# Patient Record
Sex: FEMALE | Race: WHITE | NOT HISPANIC OR LATINO | Employment: PART TIME | URBAN - METROPOLITAN AREA
[De-identification: names, ages, dates, MRNs, and addresses within clinical notes are randomized per-mention and may not be internally consistent; named-entity substitution may affect disease eponyms.]

---

## 2017-01-24 ENCOUNTER — GENERIC CONVERSION - ENCOUNTER (OUTPATIENT)
Dept: OTHER | Facility: OTHER | Age: 52
End: 2017-01-24

## 2017-01-24 DIAGNOSIS — Z12.31 ENCOUNTER FOR SCREENING MAMMOGRAM FOR MALIGNANT NEOPLASM OF BREAST: ICD-10-CM

## 2017-01-24 DIAGNOSIS — R92.2 INCONCLUSIVE MAMMOGRAM: ICD-10-CM

## 2017-01-24 DIAGNOSIS — Z80.3 FAMILY HISTORY OF MALIGNANT NEOPLASM OF BREAST: ICD-10-CM

## 2017-01-27 ENCOUNTER — GENERIC CONVERSION - ENCOUNTER (OUTPATIENT)
Dept: OTHER | Facility: OTHER | Age: 52
End: 2017-01-27

## 2017-02-15 ENCOUNTER — ALLSCRIPTS OFFICE VISIT (OUTPATIENT)
Dept: OTHER | Facility: OTHER | Age: 52
End: 2017-02-15

## 2017-02-17 ENCOUNTER — ALLSCRIPTS OFFICE VISIT (OUTPATIENT)
Dept: OTHER | Facility: OTHER | Age: 52
End: 2017-02-17

## 2017-02-17 LAB — PAP (HISTORICAL): NORMAL

## 2017-02-19 ENCOUNTER — GENERIC CONVERSION - ENCOUNTER (OUTPATIENT)
Dept: OTHER | Facility: OTHER | Age: 52
End: 2017-02-19

## 2017-03-15 ENCOUNTER — ALLSCRIPTS OFFICE VISIT (OUTPATIENT)
Dept: OTHER | Facility: OTHER | Age: 52
End: 2017-03-15

## 2017-04-12 ENCOUNTER — ALLSCRIPTS OFFICE VISIT (OUTPATIENT)
Dept: OTHER | Facility: OTHER | Age: 52
End: 2017-04-12

## 2017-06-30 ENCOUNTER — ALLSCRIPTS OFFICE VISIT (OUTPATIENT)
Dept: OTHER | Facility: OTHER | Age: 52
End: 2017-06-30

## 2017-06-30 ENCOUNTER — TRANSCRIBE ORDERS (OUTPATIENT)
Dept: ADMINISTRATIVE | Facility: HOSPITAL | Age: 52
End: 2017-06-30

## 2017-06-30 DIAGNOSIS — M79.672 PAIN OF LEFT FOOT: ICD-10-CM

## 2017-06-30 DIAGNOSIS — M25.551 PAIN IN RIGHT HIP: ICD-10-CM

## 2017-06-30 DIAGNOSIS — Z13.1 ENCOUNTER FOR SCREENING FOR DIABETES MELLITUS: ICD-10-CM

## 2017-06-30 DIAGNOSIS — Z11.59 ENCOUNTER FOR SCREENING FOR OTHER VIRAL DISEASES: ICD-10-CM

## 2017-06-30 DIAGNOSIS — Z13.6 ENCOUNTER FOR SCREENING FOR CARDIOVASCULAR DISORDERS: ICD-10-CM

## 2017-07-06 ENCOUNTER — HOSPITAL ENCOUNTER (OUTPATIENT)
Dept: RADIOLOGY | Facility: HOSPITAL | Age: 52
Discharge: HOME | End: 2017-07-06
Attending: INTERNAL MEDICINE
Payer: COMMERCIAL

## 2017-07-06 ENCOUNTER — GENERIC CONVERSION - ENCOUNTER (OUTPATIENT)
Dept: OTHER | Facility: OTHER | Age: 52
End: 2017-07-06

## 2017-07-06 DIAGNOSIS — M25.551 PAIN IN RIGHT HIP: ICD-10-CM

## 2017-07-06 DIAGNOSIS — M79.672 PAIN OF LEFT FOOT: ICD-10-CM

## 2017-07-06 LAB — AMBIG ABBREV LP DEFAULT (HISTORICAL): NORMAL

## 2017-07-06 PROCEDURE — 73502 X-RAY EXAM HIP UNI 2-3 VIEWS: CPT

## 2017-07-06 PROCEDURE — 73630 X-RAY EXAM OF FOOT: CPT

## 2017-07-07 ENCOUNTER — GENERIC CONVERSION - ENCOUNTER (OUTPATIENT)
Dept: OTHER | Facility: OTHER | Age: 52
End: 2017-07-07

## 2017-07-07 LAB
CHOLEST SERPL-MCNC: 177 MG/DL (ref 100–199)
GLUCOSE SERPL-MCNC: 90 MG/DL (ref 65–99)
HDLC SERPL-MCNC: 47 MG/DL
HEP C RNA QUAL (HISTORICAL): 0.1 S/CO RATIO (ref 0–0.9)
INTERPRETATION (HISTORICAL): NORMAL
LDLC SERPL CALC-MCNC: 108 MG/DL (ref 0–99)
TRIGL SERPL-MCNC: 110 MG/DL (ref 0–149)
VLDLC SERPL CALC-MCNC: 22 MG/DL (ref 5–40)

## 2017-07-31 ENCOUNTER — ALLSCRIPTS OFFICE VISIT (OUTPATIENT)
Dept: OTHER | Facility: OTHER | Age: 52
End: 2017-07-31

## 2017-09-12 ENCOUNTER — GENERIC CONVERSION - ENCOUNTER (OUTPATIENT)
Dept: OTHER | Facility: OTHER | Age: 52
End: 2017-09-12

## 2017-09-13 ENCOUNTER — GENERIC CONVERSION - ENCOUNTER (OUTPATIENT)
Dept: OTHER | Facility: OTHER | Age: 52
End: 2017-09-13

## 2017-09-13 DIAGNOSIS — Z12.31 ENCOUNTER FOR SCREENING MAMMOGRAM FOR MALIGNANT NEOPLASM OF BREAST: ICD-10-CM

## 2017-12-15 ENCOUNTER — ALLSCRIPTS OFFICE VISIT (OUTPATIENT)
Dept: OTHER | Facility: OTHER | Age: 52
End: 2017-12-15

## 2017-12-16 NOTE — PROGRESS NOTES
Assessment  1  Traumatic injury of head, initial encounter (649 01) (S09 90XA)   2  Concussion (850 9) (S06 0X9A)    Plan  Concussion, Traumatic injury of head, initial encounter    · * CT HEAD WO CONTRAST; Status:Need Information - Financial Authorization; Requested for:[id number];     Discussion/Summary    Tylenol for pain  Have CT of head done  Follow up if not improving  The patient was counseled regarding diagnostic results,-- instructions for management,-- risk factor reductions,-- impressions,-- risks and benefits of treatment options,-- importance of compliance with treatment  total time of encounter was 25 minutes-- and-- 20 minutes was spent counseling  Possible side effects of new medications were reviewed with the patient/guardian today  The treatment plan was reviewed with the patient/guardian  The patient/guardian understands and agrees with the treatment plan     Self Referrals: No      Chief Complaint  C/C ER follow up head injury 3 days ago      History of Present Illness  HPI: Acute visitPatient at work on Tuesday 12/12/17 had a box fall off a shelf and strike her at her mid forehead  She recalls falling backward but does not think she had any loss of consciousness  She went to [facility] ER, where she was evaluated, no studies were done and she was discharged  The following day she felt a little lightheaded and had a headache and noted a headache when she would go from bending to standing  She has had some mild nausea, no visual disturbance, but describes herself isn't just started feeling herself  Yesterday when she awoke her face was swollen in her nose was swollen  She presents today for re-evaluation  She states she feels as if she is getting better but does not feel herself  States she feels a little disconnected  ER records obtained and reviewed  Review of Systems   Constitutional: feeling poorly-- and-- feeling tired, but-- no fever-- and-- no chills    ENT: no earache,-- no nosebleeds,-- no hearing loss-- and-- no nasal discharge  Cardiovascular: no complaints of slow or fast heart rate, no chest pain, no palpitations, no leg claudication or lower extremity edema  Respiratory: no complaints of shortness of breath, no wheezing, no dyspnea on exertion, no orthopnea or PND  Gastrointestinal: no abdominal pain  Genitourinary: no dysuria  Musculoskeletal: arthralgias-- and-- Slight tenderness right shoulder  Neurological: headache,-- confusion-- and-- dizziness, but-- no numbness,-- no tingling-- and-- no fainting  Active Problems  1  Asymptomatic postmenopausal state (V49 81) (Z78 0)   2  Back pain, lumbosacral (724 2,724 6) (M54 5)   3  Bicipital tendinitis of right shoulder (726 12) (M75 21)   4  Carpal tunnel syndrome of right wrist (354 0) (G56 01)   5  Cervical disc disorder with radiculopathy (723 4) (M50 10)   6  Chronic cluster headache, not intractable (339 02) (G44 029)   7  Chronic venous hypertension involving both sides (459 30) (I87 303)   8  Dense breasts (793 82) (R92 2)   9  Encounter for gynecological examination with abnormal finding (V72 31) (Z01 411)   10  Encounter for screening mammogram for malignant neoplasm of breast (V76 12)  (Z12 31)   11  Foot pain, left (729 5) (M79 672)   12  Lymphedema (457 1) (I89 0)   13  Need for hepatitis C screening test (V73 89) (Z11 59)   14  Right hip pain (719 45) (M25 551)   15  Screening for cardiovascular condition (V81 2) (Z13 6)   16  Screening for diabetes mellitus (V77 1) (Z13 1)   17  Spider veins (448 1) (I78 1)   18  Varicose veins (454 9) (I83 90)   19  Visit for screening mammogram (T57 83) (Z12 31)    Past Medical History  Active Problems And Past Medical History Reviewed: The active problems and past medical history were reviewed and updated today  Surgical History  Surgical History Reviewed: The surgical history was reviewed and updated today         Social History   · Brushes teeth twice a day   · Current every day smoker (305 1) (F17 200)   · 1 ppd x 40 yrs   · Dental care, regularly   · Full-time employment   · [address]   ·    · No drug use   · Social alcohol use (Z78 9)   · Two children  The social history was reviewed and updated today  The social history was reviewed and is unchanged  Family History  Family History Reviewed: The family history was reviewed and updated today  Current Meds   1  Amitriptyline HCl - 25 MG Oral Tablet; take 2 tablets at bedtime; Therapy: 73VIO3982 to (Last Rx:80Lfx5864)  Requested for: 77GBS7265 Ordered   2  Eletriptan Hydrobromide 40 MG Oral Tablet; TAKE 1 TABLET AT ONSET OF MIGRAINE  MAY REPEAT ONCE AFTER 2 HOURS  MAX 2 DOSES/24 HOURS; Therapy: 54RVV4272 to (Evaluate:98Bww8508)  Requested for: 04Iuo5336; Last Rx:13Sep2017 Ordered   3  Gabapentin 300 MG Oral Capsule; take 1 capsule twice a day; Therapy: 61NKE1388 to (Evaluate:21Sep2017)  Requested for: 71NXN6595; Last Rx:13Rof3419 Ordered   4  Hydrocodone-Acetaminophen 5-325 MG Oral Tablet; TAKE 1 TABLET 3 times daily PRN; Therapy: 92OAD2070 to (Evaluate:62Mlp2737); Last Rx:02Nov2016 Ordered   5  Topiramate 25 MG Oral Tablet; take up to 4 po q hs (takes 2 po q hs usually but on days she has a headache increased to 4 q hs); Therapy: 89IEL6661 to [phone])  Requested for: 95XJY9956; Last Rx:01Nov2017 Ordered   6  Verapamil HCl  MG Oral Capsule Extended Release 24 Hour; take 1 capsule daily; Therapy: 06VEY1757 to (Last Rx:19Izb4000)  Requested for: 06LZW9796 Ordered   7  Verapamil HCl  MG Oral Capsule Extended Release 24 Hour; TAKE 1 CAPSULE EVERY DAY; Therapy: 84WZM9334 to (Evaluate:13Jun2017)  Requested for: 50JTW4079; Last Rx:15Mar2017 Ordered    The medication list was reviewed and updated today  Allergies  1  No Known Drug Allergies  2  No Known Environmental Allergies   3   No Known Food Allergies    Vitals   Recorded: 15Dec2017 01:25PM   Temperature 98 7 F   Heart Rate 84   Systolic 763 Diastolic 80   Weight 489 lb    BMI Calculated 35 58   BSA Calculated 2 29   O2 Saturation 98     Physical Exam   Constitutional  General appearance: No acute distress, well appearing and well nourished  -- Patient is awake alert and oriented  Eyes  Conjunctiva and lids: No swelling, erythema or discharge  -- Ecchymosis of the inferior periorbital area in parentheses raccoon eyes)  Pupils and irises: Equal, round and reactive to light  Ears, Nose, Mouth, and Throat  External inspection of ears and nose: Normal    Otoscopic examination: Tympanic membranes translucent with normal light reflex  Canals patent without erythema  Nasal mucosa, septum, and turbinates: Normal without edema or erythema  Oropharynx: Normal with no erythema, edema, exudate or lesions  Pulmonary  Respiratory effort: No increased work of breathing or signs of respiratory distress  Auscultation of lungs: Clear to auscultation  Cardiovascular  Auscultation of heart: Normal rate and rhythm, normal S1 and S2, without murmurs  Examination of extremities for edema and/or varicosities: Normal    Carotid pulses: Normal    Lymphatic  Palpation of lymph nodes in neck: No lymphadenopathy  Musculoskeletal  Gait and station: Normal    Skin  Skin and subcutaneous tissue: Normal without rashes or lesions  Neurologic  Cranial nerves: Cranial nerves 2-12 intact  Reflexes: 2+ and symmetric  Sensation: No sensory loss  Psychiatric  Orientation to person, place, and time: Normal    Mood and affect: Normal    Additional Exam:  Cranial nerves 2-12 intact, DTRs equal, strength equal, gross sensory motor functions intact, cerebral cerebellar functions intact  Message  Return to work or school:   Lesly Ignacio is under my professional care  She was seen in my office on 12/15/17   She is able to return to work on  12/18/17      Please excuse Abhi Barbosa from work 12/15-12/17/17 due to an acute illness  Claude Sexton PA-C        Future Appointments    Date/Time Provider Specialty Site   02/20/2018 03:55 PM Altagracia Holt MD Neurology [facility]   02/16/2018 01:00 PM Fransisco Loera[address] [facility]     Signatures   Electronically signed by : Lisa Barrett, [facility]; Dec 15 2017  2:20PM EST                       (Author)    Electronically signed by : Andie Babb MD; Dec 15 2017  2:34PM EST

## 2018-01-10 ENCOUNTER — ALLSCRIPTS OFFICE VISIT (OUTPATIENT)
Dept: OTHER | Facility: OTHER | Age: 53
End: 2018-01-10

## 2018-01-10 NOTE — RESULT NOTES
Message   Labs look pretty good  A little anemic (low RBC count)  Would like you to to repeat the CBC with checking iron, B12, folate, ferritin  Verified Results  (1) CBC/PLT/DIFF 08Apr2016 09:53AM SOASTA     Test Name Result Flag Reference   WBC 4 4 x10E3/uL  3 4-10 8   RBC 3 49 x10E6/uL L 3 77-5 28   Hemoglobin 10 7 g/dL L 11 1-15 9   Hematocrit 32 7 % L 34 0-46  6   MCV 94 fL  79-97   MCH 30 7 pg  26 6-33 0   MCHC 32 7 g/dL  31 5-35 7   RDW 15 6 % H 12 3-15 4   Platelets 758 M25P4/TG  150-379   Neutrophils 52 %     Lymphs 38 %     Monocytes 7 %     Eos 2 %     Basos 1 %     Neutrophils (Absolute) 2 3 x10E3/uL  1 4-7 0   Lymphs (Absolute) 1 7 x10E3/uL  0 7-3 1   Monocytes(Absolute) 0 3 x10E3/uL  0 1-0 9   Eos (Absolute) 0 1 x10E3/uL  0 0-0 4   Baso (Absolute) 0 0 x10E3/uL  0 0-0 2   Immature Granulocytes 0 %     Immature Grans (Abs) 0 0 x10E3/uL  0 0-0 1     (1) COMPREHENSIVE METABOLIC PANEL 55IOP6084 67:52OV SOASTA     Test Name Result Flag Reference   Glucose, Serum 92 mg/dL  65-99   BUN 13 mg/dL  6-24   Creatinine, Serum 0 61 mg/dL  0 57-1 00   eGFR If NonAfricn Am 105 mL/min/1 73  >59   eGFR If Africn Am 121 mL/min/1 73  >59   BUN/Creatinine Ratio 21  9-23   Sodium, Serum 140 mmol/L  134-144   Potassium, Serum 4 6 mmol/L  3 5-5 2   Chloride, Serum 104 mmol/L     Carbon Dioxide, Total 22 mmol/L  18-29   Calcium, Serum 9 0 mg/dL  8 7-10 2   Protein, Total, Serum 6 4 g/dL  6 0-8 5   Albumin, Serum 4 0 g/dL  3 5-5 5   Globulin, Total 2 4 g/dL  1 5-4 5   A/G Ratio 1 7  1 1-2 5   Bilirubin, Total <0 2 mg/dL  0 0-1 2   Alkaline Phosphatase, S 126 IU/L H    AST (SGOT) 20 IU/L  0-40   ALT (SGPT) 27 IU/L  0-32     (LC) Lipid Panel 08Apr2016 09:53AM Kristy Civil     Test Name Result Flag Reference   Cholesterol, Total 176 mg/dL  100-199   Triglycerides 80 mg/dL  0-149   HDL Cholesterol 51 mg/dL  >39   According to ATP-III Guidelines, HDL-C >59 mg/dL is considered a  negative risk factor for CHD  VLDL Cholesterol Lev 16 mg/dL  5-40   LDL Cholesterol Calc 109 mg/dL H 0-99     (1) TSH 27Zoh9819 09:53AM Mary Calender     Test Name Result Flag Reference   TSH 0 771 uIU/mL  0 450-4 500     [facility]) Sharon Mckeon PPL17 Default 17ITW8462 09:53AM Mary Calender     Test Name Result Flag Reference   Sharon Mckeon CMP14 Default Comment     A hand-written panel/profile was received from your office  In  accordance with the [facility] Ambiguous Test Code Policy dated July 6230, we have completed your order by using the closest currently  or formerly recognized AMA panel  We have assigned Comprehensive  Metabolic Panel (14), Test Code #770231 to this request   If this  is not the testing you wished to receive on this specimen, please  contact the [address] [place] [address] Client Inquiry/Technical Services Department  to clarify the test order  We appreciate your business  [facility]) Sharon Mckeon LP Default [id number] 09:53AM Mary Calender     Test Name Result Flag Reference   Ambig Abbrev LP Default Comment     A hand-written panel/profile was received from your office  In  accordance with the [facility] Ambiguous Test Code Policy dated July 8072, we have completed your order by using the closest currently  or formerly recognized AMA panel  We have assigned Lipid Panel,  Test Code #526379 to this request  If this is not the testing you  wished to receive on this specimen, please contact the [address] [place] [address]  Client Inquiry/Technical Services Department to clarify the test  order  We appreciate your business

## 2018-01-10 NOTE — PROGRESS NOTES
Assessment    1  Bleeding from varicose veins of left lower extremity (454 8) (U80 457)   2  Varicose veins (454 9) (I86 8)   3  Spider veins (448 1) (I78 1)    Plan    1  VAS LOWER LIMB VENOUS DUPLEX STUDY, WITH REFLUX ASSESSMENT, COMPLETE   BILATERAL; Status:Hold For - Scheduling; Requested for:37Sle4424;    2  Follow-up visit in 1 month Evaluation and Treatment  Follow-up  Status: Hold For -   Scheduling  Requested for: 38YYG2190   3  Apply moisturizing cream or lotion several times a day ; Status:Complete;   Done:   78HNS3543   4  Support stockings can help keep the blood from pooling in the small veins in your feet   and legs ; Status:Complete;   Done: 77YVH6972   5  We recommend that you use a pneumatic compression device on your leg ;   Status:Complete;   Done: 94WRP6980    Discussion/Summary  Discussion Summary:   Although the area of bleeding spider and ectasia is appears to be thrombosed following her treatment at [facility] a reevaluation of her venous status is indicated given the extensive spider vein ectasia is affecting both lower extremities and her chronic lymphedema as well as her dry skin  I have recommended she continue to use her gradient compression stockings as well as her lymphedema pumps but most importantly that she apply moisturizer to the skin (Eucerin) to avoid dry skin cracking which could result in bleeding spider veins  Counseling Documentation With Imm: The was counseled regarding diagnostic results, prognosis, risks and benefits of treatment options  total time of encounter was minutes and 25 minutes was spent counseling  Chief Complaint  Chief Complaint Free Text Note Form: " I have varicose veins "   Ms Raisa Yanez is here today to have her legs evaluated  She c/o of bilateral lower extremity bulging varicose veins x40 years  She states she has been treated in the past for her veins but it has been some time   She c/o bilateral lower extremity edema but associates that with lymphedema  She states she did have a bleeding varicose vein in the left leg in April 2016  She does have a hx of left leg vein ligation x15 years ago  She offers no further concerns at this time  SHe is a current smoker and reports smoking roughly a pack per day  History of Present Illness  Free Text HPI: She c/o of bilateral lower extremity bulging varicose veins x40 years  She states she has been treated in the past for her veins but it has been some time  She c/o bilateral lower extremity edema but associates that with lymphedema  She states she did have a bleeding varicose vein in the left leg in April 2016  She does have a hx of left leg vein ligation x15 years ago  She offers no further concerns at this time  SHe is a current smoker and reports smoking roughly a pack per day  Her last lower extremity venous surgical procedure was probably 12 years ago  (Dr Ramandeep Nunez) previously she had several surgical procedures by Dr Bandar Barba at [facility]  She does have lymphedema pumps but has not been wearing them recently  She also has gradient compression stockings but has not worn them in some time as they were ineffective at reducing her lymphedema  Her bleeding episodes occurred from the same cluster of spider veins but the third episode most recently was the most severe  She reports that she had undergone cauterization in the emergency room at [facility]  Review of Systems  Complete Female - Vasc:   Constitutional: No fever or chills, feels well, no tiredness, no recent weight gain or weight loss  Eyes: No sudden vision loss, no blurred vision, no double vision  ENT: no loss of hearing, no nosebleeds, no hoarseness  Cardiovascular: irregular heart rate, no painful veins and no leg pain with walking, but no chest pain, no intermittent leg claudication, no palpitations and bleeding veins     Respiratory: No sob, no wheezing, no cough, no sob with exertion, no orthopnea  Gastrointestinal: No nausea, No vomiting, no diarrhea, no blood in stool  Genitourinary: no dysuria, no Hematuria,no urinary incontinence  Musculoskeletal: no limb pain, no limb swelling  Integumentary: no ulcers, but no rashes, no itching, no skin lesions and no skin wound  Neurological: no dementia, but no numbness, no confusion, no dizziness, no limb weakness, no convulsions, no fainting and no difficulty walking  Psychiatric: no depression, no mood disorders, no anxiety  Hematologic/Lymphatic: no bleeding disorder, no easy bruising  ROS Reviewed:   ROS reviewed  Active Problems    1  Bicipital tendinitis of right shoulder (726 12) (M75 21)   2  Carpal tunnel syndrome of right wrist (354 0) (G56 01)   3  Cellulitis of left lower extremity (682 6) (L03 116)   4  Cervical disc disorder with radiculopathy (723 4) (M50 10)   5  Chronic cluster headache, not intractable (339 02) (G44 029)   6  Cluster headaches (339 00) (G44 009)   7  Lymphedema (457 1) (I89 0)   8  Need for influenza vaccination (V04 81) (Z23)   9  Need for pneumococcal vaccine (V03 82) (Z23)   10  Need for Tdap vaccination (V06 1) (Z23)   11  Strain of foot, right (845 10) (S96 911A)   12  Strain of neck muscle, initial encounter (847 0) (S16 1XXA)   13  Tendinitis of right rotator cuff (726 10) (M75 81)   14  Varicose veins (454 9) (I86 8)    Past Medical History    1  History of Colon polyps (211 3) (K63 5)  Active Problems And Past Medical History Reviewed: The active problems and past medical history were reviewed and updated today  Surgical History  Surgical History Reviewed: The surgical history was reviewed and updated today  Family History  Mother    1  Family history of   Father    2  Family history of cardiac disorder (V17 49) (Z82 49)  Sister    3  Family history of Cancer of appendix   4  Family history of    5   Family history of malignant neoplasm of female breast (V16 3) (Z80 3)  Family History    6  Denied: Family history of mental disorder   7  Denied: Family history of substance abuse  Family History Reviewed: The family history was reviewed and updated today  Social History    · Brushes teeth twice a day   · Current every day smoker (305 1) (F17 200)   · Dental care, regularly   · Full-time employment   ·    · No alcohol use   · No drug use   · Two children  Social History Reviewed: The social history was reviewed and updated today  Current Meds   1  Amitriptyline HCl - 25 MG Oral Tablet; take 2 tablets at bedtime; Therapy: 27IYS6888 to (Evaluate:07Nov2016)  Requested for: 12Auy2585; Last   Rx:26Vnb7784 Ordered   2  Topiramate 25 MG Oral Tablet; take 2 tablets at bedtime; Therapy: 56WWR4576 to (Evaluate:46Wuh1566)  Requested for: 26Apr2016; Last   Rx:36Cih6328 Ordered    Allergies    1  No Known Drug Allergies    2  No Known Environmental Allergies   3  No Known Food Allergies    Vitals  Vital Signs [Data Includes: Current Encounter]    Recorded: 49XPH7456 11:22AM   Heart Rate 68, L Radial   Pulse Quality Normal, L Radial   Respiration 16   Respiration Quality Normal   Systolic 210, LUE, Sitting   Diastolic 76, LUE, Sitting   Height 5 ft 11 in   Weight 248 lb 9 oz   BMI Calculated 34 67   BSA Calculated 2 31     Results/Data  Diagnostic Studies Reviewed Vasc:   Vascular Studies Reviewed: Recent lower extremity venous duplex at [facility] showed no evidence of deep vein thrombosis  Physical Exam    Carotid: right 2+, no bruit heard on the right, left 2+ and no bruit on the left  Brachial: right 2+ and left 2+  Radial: right 2+ and left 2+  Femoral: right 2+, no bruit heard on the right, left 2+ and no bruit on the left  Popliteal: right 2+ and left 2+  Posterior tibialis: right 2+ and left 2+  Dorsalis pedis: right 2+ and left 2+  Distal Pulse Exam: Normal Capillary Refill       Extremities: bilateral ankle pitting edema, bilateral pretibial pitting edema and no foot edema  LE Varicose Veins: right leg truncal veins, left leg truncal veins, right leg reticular veins, left leg reticular veins, right leg 4+ spider veins and left leg 4+ spider veins  Spider veins over the anterolateral left ankle appear to be sclerosed with palpable phlebitic changes both in the area of prior bleeding and extending proximally over the anterior compartment  Future Appointments    Date/Time Provider Specialty Site   05/24/2016 02:00 PM ROGER Daniels   Orthopedic Surgery [facility] SURGICAL [facility]   07/29/2016 12:40 PM Durga Watson MD Neurology [facility] [address] [place] [address]   03/31/2017 11:20 AM Luis Wheeler MD Internal Medicine [facility] INTERNAL MED     Signatures   Electronically signed by : Laura Thao MD; May 10 2016 11:55AM EST                       (Author)    Electronically signed by : Laura Thao MD; May 10 2016  1:09PM EST                       (Author)

## 2018-01-12 VITALS — SYSTOLIC BLOOD PRESSURE: 124 MMHG | DIASTOLIC BLOOD PRESSURE: 73 MMHG | HEIGHT: 71 IN | HEART RATE: 80 BPM

## 2018-01-12 VITALS
HEIGHT: 70 IN | BODY MASS INDEX: 35.65 KG/M2 | SYSTOLIC BLOOD PRESSURE: 120 MMHG | HEART RATE: 95 BPM | DIASTOLIC BLOOD PRESSURE: 78 MMHG | WEIGHT: 249 LBS

## 2018-01-12 NOTE — PROGRESS NOTES
Assessment    1  Encounter for preventive health examination (V70 0) (Z00 00)    Plan  Foot pain, left    · * XR FOOT 3+ VIEW LEFT; Status:Active; Requested AQK:[id number];   Lymphedema    · Gradient compression stocking, below knee, 20-30mm Hg, each; Status:Complete;    Done: [id number]  Need for hepatitis C screening test    · (1) HEP C ANTIBODY; Status:Active; Requested OME:69PQT1366;   Right hip pain    · * XR HIP/PELV 2-3 VWS RIGHT W PELVIS IF PERFORMED; Status:Active; Requested  HNE:[id number];   Screening for cardiovascular condition    · (1) LIPID PANEL, FASTING; Status:Active; Requested GBB:27BDQ5436;   Screening for diabetes mellitus    · (1) GLUCOSE,  FASTING; Status:Active; Requested FBS:09NKH7985;     Discussion/Summary    She is current with preventive screening  We'll order x-rays of the areas in question with discomfort  The patient was counseled regarding instructions for management, impressions  Possible side effects of new medications were reviewed with the patient/guardian today  The treatment plan was reviewed with the patient/guardian  The patient/guardian understands and agrees with the treatment plan     Self Referrals: No      Chief Complaint  Yearly physical      History of Present Illness  HPI: Patient comes in today for yearly physical  She states her chronic problems are stable  She has been noticing some pain on the lateral aspect of her left foot  She initially thought it was her shoes but she switch them and still has the problem  She also notes pain in her right hip  Denies any trauma  Feels like it times that it pops out of place  Usually feels pain with walking  More so in the groin area, no back pain  Review of Systems    Cardiovascular: no chest pain  Respiratory: no shortness of breath  Gastrointestinal: no abdominal pain  Active Problems    1  Asymptomatic postmenopausal state (V49 81) (Z78 0)   2  Back pain, lumbosacral (724 2,724 6) (M54 5)   3   Bicipital tendinitis of right shoulder (726 12) (M75 21)   4  Carpal tunnel syndrome of right wrist (354 0) (G56 01)   5  Cervical disc disorder with radiculopathy (723 4) (M50 10)   6  Chronic cluster headache, not intractable (339 02) (G44 029)   7  Chronic venous hypertension involving both sides (459 30) (I87 303)   8  Dense breasts (793 82) (R92 2)   9  Encounter for gynecological examination with abnormal finding (V72 31) (Z01 411)   10  Lymphedema (457 1) (I89 0)   11  Spider veins (448 1) (I78 1)   12   Varicose veins (454 9) (I86 8)    Past Medical History    · History of Acute maxillary sinusitis, recurrence not specified (461 0) (J01 00)   · History of Bleeding from varicose veins of left lower extremity (454 8) (E17 412)   · History of Cellulitis of left lower extremity (682 6) (L03 116)   · History of Colon polyps (211 3) (K63 5)   · Denied: History of mental disorder   · History of Strain of foot, right (845 10) (O88 568P)   · History of Strain of neck muscle, initial encounter (847 0) (S16 1XXA)    Surgical History    · History of Lower Back Surgery   · History of Neuroplasty Median Nerve At Carpal Tunnel   · History of Tubal Ligation   · History of Varicose Vein Ligation    Family History  Mother    · Family history of   Father    · Family history of cardiac disorder (V17 49) (Z80 55)  Daughter    · Family history of migraine headaches (V17 2) (Z82 0)  Sister    · Family history of Cancer of appendix   · Family history of    · Family history of malignant neoplasm of breast (V16 3) (Z80 3)   · Family history of malignant neoplasm of female breast (V16 3) (Z80 3)  Maternal Grandmother    · Family history of gynecological problem (V18 7) (Z84 2)  Maternal Aunt    · Family history of malignant neoplasm of female breast (V16 3) (Z80 3)    Social History    · Brushes teeth twice a day   · Current every day smoker (305 1) (F17 200)   · 1 ppd x 40 yrs   · Dental care, regularly   · Full-time employment   · [address] [facility]   ·    · No drug use   · Social alcohol use (Z78 9)   · Two children    Current Meds   1  Amitriptyline HCl - 25 MG Oral Tablet; TAKE 2 TABLET Bedtime  Requested for:   78HNA2196; Last Rx:23May2017 Ordered   2  Gabapentin 300 MG Oral Capsule; take 1 capsule twice a day; Therapy: 70OYF8913 to (Evaluate:39Bsr7511)  Requested for: 17EQN4368; Last   Rx:24May2017 Ordered   3  Hydrocodone-Acetaminophen 5-325 MG Oral Tablet; TAKE 1 TABLET 3 times daily PRN; Therapy: 34TWN8987 to (Evaluate:22Uos5883); Last Rx:02Nov2016 Ordered   4  Relpax 40 MG Oral Tablet; TAKE 1 TABLET AT ONSET OF MIGRAINE  MAY REPEAT   ONCE AFTER 2 HOURS  MAX 2 DOSES/24 HOURS; Therapy: 70FHE7573 to (Evaluate:88Kbd3232)  Requested for: 24TFB9248; Last   Rx:15Mar2017 Ordered   5  Topiramate 100 MG Oral Tablet; TAKE 1 TABLET AT BEDTIME; Therapy: 05HCR2180 to ([place])  Requested for: 71XGG3405; Last   Rx:08Jun2017 Ordered   6  Verapamil HCl  MG Oral Capsule Extended Release 24 Hour; take 1 capsule   daily; Therapy: 38AXH4323 to (Evaluate:61Hpm9396)  Requested for: 79LQC7529; Last   Rx:19Jun2017 Ordered   7  Verapamil HCl  MG Oral Capsule Extended Release 24 Hour; TAKE 1 CAPSULE   EVERY DAY; Therapy: 30DJX8011 to (Evaluate:13Jun2017)  Requested for: 22EIZ4386; Last   Rx:15Mar2017 Ordered    Allergies    1  No Known Drug Allergies    2  No Known Environmental Allergies   3  No Known Food Allergies    Vitals   Recorded: 25VBM7095 02:48PM   Heart Rate 76   Systolic 817   Diastolic 80   Height 5 ft 11 in   Weight 253 lb    BMI Calculated 35 29   BSA Calculated 2 33   O2 Saturation 99     Physical Exam    Constitutional   General appearance: Abnormal   obese  Eyes   Conjunctiva and lids: No swelling, erythema or discharge  Pupils and irises: Equal, round and reactive to light      Ears, Nose, Mouth, and Throat   External inspection of ears and nose: Normal     Otoscopic examination: Tympanic membranes translucent with normal light reflex  Canals patent without erythema  Oropharynx: Normal with no erythema, edema, exudate or lesions  Pulmonary   Respiratory effort: No increased work of breathing or signs of respiratory distress  Auscultation of lungs: Clear to auscultation  Cardiovascular   Palpation of heart: Normal PMI, no thrills  Auscultation of heart: Normal rate and rhythm, normal S1 and S2, without murmurs  Examination of extremities for edema and/or varicosities: Abnormal   Significantly edema in both lower extremities  Abdomen   Abdomen: Non-tender, no masses  Liver and spleen: No hepatomegaly or splenomegaly  Lymphatic   Palpation of lymph nodes in neck: No lymphadenopathy  Musculoskeletal   Gait and station: Normal     Digits and nails: Normal without clubbing or cyanosis  Inspection/palpation of joints, bones, and muscles: Normal     Skin   Skin and subcutaneous tissue: Normal without rashes or lesions  Neurologic   Cranial nerves: Cranial nerves 2-12 intact  Reflexes: 2+ and symmetric  Sensation: No sensory loss  Psychiatric   Orientation to person, place, and time: Normal     Mood and affect: Normal        Health Management  Special screening for malignant neoplasm of colon   COLONOSCOPY; every 3 years; Last 89RVU7298; Next Due: 71HGC1301;  Active    Future Appointments    Date/Time Provider Specialty Site   07/31/2017 11:20 AM Esthela Bush MD [facility]rology [facility] [address] [place] [address]   02/16/2018 01:00 PM REINALDO Mcarthur Obstetrics/Gynecology [facility]     Signatures   Electronically signed by : Alida Harrison MD; Jun 30 2017  3:28PM EST                       (Author)

## 2018-01-12 NOTE — RESULT NOTES
Verified Results  ([facility]) Lipid Panel [id number] 10:44AM Derrill Legions     Test Name Result Flag Reference   Cholesterol, Total 177 mg/dL  100-199   Triglycerides 110 mg/dL  0-149   HDL Cholesterol 47 mg/dL  >39   VLDL Cholesterol Lev 22 mg/dL  5-40   LDL Cholesterol Calc 108 mg/dL H 0-99     () Glucose, Serum 76LXK0506 10:44AM [facility]     Test Name Result Flag Reference   Glucose, Serum 90 mg/dL  65-99     [facility]) Larinda Mealing LP Default [id number] 10:44AM Derrill Legions     Test Name Result Flag Reference   Ambig Abbrev LP Default Comment     A hand-written panel/profile was received from your office  In  accordance with the [facility] Ambiguous Test Code Policy dated July 1827, we have completed your order by using the closest currently  or formerly recognized AMA panel  We have assigned Lipid Panel,  Test Code #462449 to this request  If this is not the testing you  wished to receive on this specimen, please contact the [facility]  Client Inquiry/Technical Services Department to clarify the test  order  We appreciate your business  () HCV Antibody reflex to TIGIST [id number] 10:44AM [facility]     Test Name Result Flag Reference   HCV Ab Hepatitis C virus Ab Signal/Cutoff 0 1 s/co ratio  0 0-0 9   Interpretation: Comment     Negative  Not infected with HCV, unless recent infection is suspected or other  evidence exists to indicate HCV infection

## 2018-01-12 NOTE — PROCEDURES
Results/Data    Procedure: Electromyogram and Nerve Conduction Study  Indication: Right Upper Extremity   Referred by Dr Jose Enrique Aldrich  The procedure's were discussed with the patient  Written consent was obtained prior to the procedure and is detailed in the patient's record  Prior to the start of the procedure a time out was taken and the identity of the patient was confirmed via name and date of birth with the patient  The correct site and the procedure to be performed were confirmed  The correct side was confirmed if applicable  The positioning of the patient was verified  The availability of the correct equipment was verified  Procedure Start Time: 1:35    Technique: A sterile concentric needle electrode was used  The patient tolerated the procedure well  Complications include The Patient only agreed to have the right upper extremity done, not the left side  Results  : Motor and sensory nerve conduction studies were performed on the median and ulnar nerves on the right  Patient refused testing of the left upper extremity  The median motor terminal latency was prolonged with a normal compound motor action potential amplitude and a normal conduction velocity across the wrist  The ulnar compound motor action potential was within normal limits  The median F wave latency was prolonged  The ulnar F wave was within normal limits  The median sensory peak latency was prolonged with a low sensory action potential amplitude  The ulnar sensory action potential was within normal limits  The median palmar evoked response was prolonged by 2 6 ms as compared to the ulnar palmar evoked response at the same distance  Concentric needle examination was performed on various proximal and distal muscles on the right including deltoid, biceps, triceps, pronator teres, APB, FDI and low cervical paraspinals  There was no evidence of active denervation in any of the muscles tested   Mild decreased recruitment of giant motor units was noted in the APB  The compound motor unit action potentials were of normal configuration with interference patterns being full or full for effort in the remaining muscles tested  Interpretation: There is electrophysiologic evidence of a:  1  Moderate median nerve compression neuropathy at the wrist with demyelinative changes, consistent with a diagnosis of carpal tunnel syndrome  2 There is no evidence of a ulnar neuropathy or cervical radiculopathy  Clinical correlation is recommended        Signatures   Electronically signed by : Megan Singh MD; Jan 20 2016  2:14PM EST                       (Author)

## 2018-01-12 NOTE — MISCELLANEOUS
Message   Recorded as Task   Date: 01/24/2017 12:53 PM, Created By: Chante Hidalgo   Task Name: Follow Up   Assigned To: Chante Hidalgo   Regarding Patient: Arnold Saleh, Status: Active   Comment:    Chante Hidalgo - 24 Jan 2017 12:53 PM     TASK CREATED  Pt called for mammo script which I approved but I noticed she has never seen us? Pls call pt to see where she will be following up   Sandra Aponte - 25 Jan 2017 4:53 PM     TASK REPLIED TO: Previously Assigned To Sandra Vila  pt has an appt set up already for 2/15/17 at 2 pm with you        Signatures   Electronically signed by : REINALDO Segura; Jan 27 2017  7:30AM EST                       (Author)

## 2018-01-13 VITALS
HEART RATE: 96 BPM | BODY MASS INDEX: 35.14 KG/M2 | OXYGEN SATURATION: 99 % | WEIGHT: 251 LBS | DIASTOLIC BLOOD PRESSURE: 76 MMHG | TEMPERATURE: 98.9 F | HEIGHT: 71 IN | SYSTOLIC BLOOD PRESSURE: 118 MMHG

## 2018-01-13 VITALS
SYSTOLIC BLOOD PRESSURE: 120 MMHG | WEIGHT: 253 LBS | HEIGHT: 71 IN | OXYGEN SATURATION: 99 % | DIASTOLIC BLOOD PRESSURE: 80 MMHG | HEART RATE: 76 BPM | BODY MASS INDEX: 35.42 KG/M2

## 2018-01-14 VITALS
BODY MASS INDEX: 35.28 KG/M2 | DIASTOLIC BLOOD PRESSURE: 80 MMHG | HEIGHT: 71 IN | WEIGHT: 252 LBS | HEART RATE: 84 BPM | RESPIRATION RATE: 18 BRPM | SYSTOLIC BLOOD PRESSURE: 128 MMHG

## 2018-01-14 NOTE — RESULT NOTES
Verified Results  (B) PAP (REFLEX TO HPV PLUS WHEN ASC-US) [id number] 04:20PM [place] [facility]     Test Name Result Flag Reference   PAP, LIQUID-BASED NILM     DIAGNOSIS:            Negative for intraepithelial lesion or malignancy  ADEQUACY:             Satisfactory for evaluation /                         Endocervical/transformation zone component                         present  COMMENT:              This Pap smear was screened with the assistance                         of the aihuishouPrep(TM) Imaging System and                         screened by a cytotechnologist   SPECIMEN SOURCE:      PAP (RFLX HPV PLUS WHENASC-US), CERVIX  CLINICAL INFORMATION: LMP: N/A                        Post menopausal                        Provided Diagnosis Codes: X57 303                                                Cervicovaginal cytology should be considered a                         screening procedure subject to false negatives                         and false positives  Results are more reliable                         when a satisfactory sample is obtained on a                         regular repetitive basis, and should be                         interpreted together with past and current                         clinical data    ELECTRONICALLY SIGNED   BY:                   Screened By: FRITZ Toro (ASCP)   Case                         Electronically Signed 02/17/2017

## 2018-01-15 ENCOUNTER — GENERIC CONVERSION - ENCOUNTER (OUTPATIENT)
Dept: OTHER | Facility: OTHER | Age: 53
End: 2018-01-15

## 2018-01-15 VITALS
BODY MASS INDEX: 35.14 KG/M2 | HEIGHT: 71 IN | DIASTOLIC BLOOD PRESSURE: 78 MMHG | SYSTOLIC BLOOD PRESSURE: 122 MMHG | WEIGHT: 251 LBS

## 2018-01-15 NOTE — RESULT NOTES
Message   Recent labs done are in normal range  The Vit B12 level is low normal, so you would benefit by taking Vit B12 1000 units daily OTC  Verified Results  (1) CBC/PLT/DIFF [id number] 01:29PM [facility]     Test Name Result Flag Reference   WBC 4 4 x10E3/uL  3 4-10 8   RBC 4 26 x10E6/uL  3 77-5 28   Hemoglobin 12 7 g/dL  11 1-15 9   Hematocrit 37 9 %  34 0-46  6   MCV 89 fL  79-97   MCH 29 8 pg  26 6-33 0   MCHC 33 5 g/dL  31 5-35 7   RDW 14 3 %  12 3-15 4   Platelets 982 Y55R1/EZ  150-379   Neutrophils 58 %     Lymphs 33 %     Monocytes 6 %     Eos 2 %     Basos 1 %     Neutrophils (Absolute) 2 6 x10E3/uL  1 4-7 0   Lymphs (Absolute) 1 4 x10E3/uL  0 7-3 1   Monocytes(Absolute) 0 3 x10E3/uL  0 1-0 9   Eos (Absolute) 0 1 x10E3/uL  0 0-0 4   Baso (Absolute) 0 0 x10E3/uL  0 0-0 2   Immature Granulocytes 0 %     Immature Grans (Abs) 0 0 x10E3/uL  0 0-0 1

## 2018-01-17 NOTE — PROGRESS NOTES
Assessment    1  Encounter for preventive health examination (V70 0) (Z00 00)   2  Acute pain of right shoulder (719 41) (M25 511)   3  Cellulitis of left lower extremity (682 6) (L03 116)   4  Rotator cuff syndrome of right shoulder (726 10) (M75 101)   5  Varicose veins (454 9) (I86 8)    Plan  Cellulitis of left lower extremity    · Cephalexin 500 MG Oral Tablet (Cephalexin Monohydrate); TAKE 1 TABLET 3  times daily  Health Maintenance    · (Q) THINPREP PAP; Status:Active - Retrospective By Protocol Authorization; Requested  for:30Mar2016;    · Follow-up visit in 1 year Evaluation and Treatment  Follow-up Dr Anca Forrest  Status:  Complete  Done: 32LHG1583  Need for influenza vaccination    · Stop: Fluzone Quadrivalent Intramuscular Suspension  Need for pneumococcal vaccine    · Stop: Prevnar 13 Intramuscular Suspension  Need for Tdap vaccination    · Tdap (Adacel)  Rotator cuff syndrome of right shoulder,     · 1 - Misha WHALEN, Jennie Chao (Orthopedic Surgery) Physician Referral  Consult  Status: Active   Requested for: 57BRX9421  Care Summary provided  : Yes  Screening for heart disease    · (1) CBC/PLT/DIFF; Status:Active; Requested for:30Mar2016;    · (1) COMPREHENSIVE METABOLIC PANEL; Status:Active; Requested for:30Mar2016;    · (1) LIPID PANEL, FASTING; Status:Active; Requested for:30Mar2016;    · (1) TSH; Status:Active; Requested for:30Mar2016;   Varicose veins    · 1 - Balbina WHALEN, Fani Couch  (Vascular Surgery) Physician Referral  Consult  Status: Active   Requested for: 26CNI7433  Care Summary provided  : Yes    Discussion/Summary  health maintenance visit healthy adult female Currently, she eats a healthy diet and has an inadequate exercise regimen  the risks and benefits of cervical cancer screening were discussed cervical cancer screening is current Breast cancer screening: the risks and benefits of breast cancer screening were discussed, monthly self breast exam was advised and mammogram is current   Colorectal cancer screening: the risks and benefits of colorectal cancer screening were discussed and colorectal cancer screening is current  The risks and benefits of immunizations were discussed and immunizations are needed  She was advised to be evaluated by an ophthalmologist  Advice and education were given regarding tobacco cessation  Patient discussion: discussed with the patient  Will treat suspect early cellulitis with antibiotic  Will obtain screening labs  Will refer to vascular for varicosities and lymphedema  Will refer to ortho for R shoulder pain  Follow yearly and as needed  Can apply topical moist heat to LLE 3-4 times daily  Possible side effects of new medications were reviewed with the patient/guardian today  The patient was counseled regarding instructions for management, risk factor reductions, risks and benefits of treatment options, importance of compliance with treatment  Chief Complaint  PATIENT HERE FOR YEARLY PHYSICAL AND STITCH REMOVAL      History of Present Illness  HM, Adult Female: The patient is being seen for a health maintenance evaluation  The last health maintenance visit was 1 5 year(s) ago  General Health: The patient's health since the last visit is described as good  She has regular dental visits  She denies vision problems  She denies hearing loss  Immunizations status: not up to date  Lifestyle:  She consumes a diverse and healthy diet  She has weight concerns  She does not exercise regularly  She uses tobacco  She denies alcohol use  She denies drug use  Screening: cancer screening reviewed and updated  metabolic screening reviewed and updated  risk screening reviewed and updated  HPI: 45 yo W female present for health assessment  7 days ago was seen in ER for bleeding varicose vein in her L leg  Vein was cauterized and sutured  Will need suture removed today  Has hx/o varicose veins and chronic lymphedema   She would like to see vascular for an opinion regarding this  She admits she does have edema pumps at home but can not use them for the required time of 12 hours/day  Has noted the area around the varicose vein has become painful and tender and red in appearance  Having R shoulder pain  Hurts on certain movements and feels weak  Cellulitis (Brief): The patient is being seen for an initial evaluation of cellulitis  Symptoms:  skin redness and skin warmth  Shoulder Tendonitis (Brief): The patient is being seen for an initial evaluation of shoulder tendonitis  Symptoms:  shoulder pain, shoulder stiffness, shoulder tenderness, shoulder weakness and decreased shoulder range of motion  The patient is currently experiencing symptoms  Review of Systems    Constitutional: no fever, not feeling poorly, no chills and not feeling tired  Eyes: No complaints of eye pain, no red eyes, no eyesight problems, no discharge, no dry eyes, no itching of eyes  ENT: no complaints of earache, no loss of hearing, no nose bleeds, no nasal discharge, no sore throat, no hoarseness  Cardiovascular: No complaints of slow heart rate, no fast heart rate, no chest pain, no palpitations, no leg claudication, no lower extremity edema  Respiratory: No complaints of shortness of breath, no wheezing, no cough, no SOB on exertion, no orthopnea, no PND  Gastrointestinal: No complaints of abdominal pain, no constipation, no nausea or vomiting, no diarrhea, no bloody stools  Genitourinary: No complaints of dysuria, no incontinence, no pelvic pain, no dysmenorrhea, no vaginal discharge or bleeding  Musculoskeletal: limb pain and limb swelling, but as noted in HPI  Integumentary: a rash and skin lesion  Neurological: No complaints of headache, no confusion, no convulsions, no numbness, no dizziness or fainting, no tingling, no limb weakness, no difficulty walking     Psychiatric: Not suicidal, no sleep disturbance, no anxiety or depression, no change in personality, no emotional problems  Hematologic/Lymphatic: No complaints of swollen glands, no swollen glands in the neck, does not bleed easily, does not bruise easily  Active Problems    1  Carpal tunnel syndrome of right wrist (354 0) (G56 01)   2  Cervical disc disorder with radiculopathy (723 4) (M50 10)   3  Chronic cluster headache, not intractable (339 02) (G44 029)   4  Lymphedema (457 1) (I89 0)   5  Strain of foot, right (845 10) (S96 911A)   6  Strain of neck muscle, initial encounter (847 0) (S16 1XXA)    Past Medical History    · History of Colon polyps (211 3) (K63 5)    Surgical History    · History of Lower Back Surgery   · History of Varicose Vein Ligation    Family History    · Family history of     · Family history of cardiac disorder (V17 49) (Z82 49)    · Family history of Cancer of appendix   · Family history of    · Family history of malignant neoplasm of female breast (V16 3) (Z80 3)    · Denied: Family history of mental disorder   · Denied: Family history of substance abuse    Social History    · Brushes teeth twice a day   · Current every day smoker (305 1) (F17 200)   · 1 ppd x 40 yrs   · Dental care, regularly   · Full-time employment   · [address]   ·    · No alcohol use   · No drug use   · Two children    Current Meds   1  Amitriptyline HCl - 25 MG Oral Tablet; take 2 tablet daily; Therapy: 75DIE9979 to (Evaluate:66Osw8309) Recorded    Allergies    1  No Known Drug Allergies    Vitals   Recorded: 37WVM5455 10:49AM   Temperature 98 6 F   Heart Rate 943   Systolic 614   Diastolic 80   Height 6 ft    Weight 250 lb    BMI Calculated 33 91   BSA Calculated 2 34   O2 Saturation 96     Physical Exam    Constitutional   General appearance: No acute distress, well appearing and well nourished  obese  Head and Face   Head and face: Normal     Eyes   Conjunctiva and lids: No swelling, erythema or discharge  Pupils and irises: Equal, round, reactive to light      Ears, Nose, Mouth, and Throat   External inspection of ears and nose: Normal     Otoscopic examination: Tympanic membranes translucent with normal light reflex  Canals patent without erythema  Hearing: Normal     Nasal mucosa, septum, and turbinates: Normal without edema or erythema  Lips, teeth, and gums: Normal, good dentition  Oropharynx: Normal with no erythema, edema, exudate or lesions  Neck   Neck: Supple, symmetric, trachea midline, no masses  Thyroid: Normal, no thyromegaly  fullnes at thyroid area, no dominant nodules  Pulmonary   Respiratory effort: No increased work of breathing or signs of respiratory distress  Auscultation of lungs: Clear to auscultation  Cardiovascular   Auscultation of heart: Normal rate and rhythm, normal S1 and S2, no murmurs  Carotid pulses: 2+ bilaterally  Abdominal aorta: Normal     Femoral pulses: 2+ bilaterally  Pedal pulses: 2+ bilaterally  Examination of extremities for edema and/or varicosities: Abnormal   chronic lymph edema of LEs  Abdomen   Abdomen: Non-tender, no masses  Liver and spleen: No hepatomegaly or splenomegaly  Lymphatic   Palpation of lymph nodes in neck: No lymphadenopathy  Palpation of lymph nodes in axillae: No lymphadenopathy  Palpation of lymph nodes in groin: No lymphadenopathy  Palpation of lymph nodes in other areas: No lymphadenopathy  Musculoskeletal   Gait and station: Normal     Digits and nails: Normal without clubbing or cyanosis  Joints, bones, and muscles: Normal     Range of motion: Normal     Stability: Normal     Muscle strength/tone: Normal     Skin   Skin and subcutaneous tissue: Normal without rashes or lesions  Examination of the skin for lesions: Abnormal   sutured VV LLE, , surronding dusky erythema w warmth present w tenderness  Palpation of skin and subcutaneous tissue: Normal turgor  Neurologic   Cranial nerves: Cranial nerves II-XII intact  Reflexes: 2+ and symmetric      Sensation: No sensory loss  Coordination: Normal finger to nose and heel to shin  Psychiatric   Judgment and insight: Normal     Orientation to person, place, and time: Normal     Recent and remote memory: Intact      Mood and affect: Normal        Results/Data  PHQ-9 Adult Depression Screening 40ELE3307 11:42AM User, Ahs     Test Name Result Flag Reference   PHQ-9 Adult Depression Score 1     Q1: 0, Q2: 1, Q3: 0, Q4: 0, Q5: 0, Q6: 0, Q7: 0, Q8: 0, Q9: 0   PHQ-9 Adult Depression Screening Negative     PHQ-9 Difficulty Level Not difficult at all     PHQ-9 Severity Minimal Depression         Future Appointments    Date/Time Provider Specialty Site   05/10/2016 11:15 AM Alissa Young MD Vascular Surgery THE VASCULAR CENTER  [facility]   04/29/2016 12:40 PM Harjinder Diaz MD [facility]   03/31/2017 11:20 AM Mayda Morgan MD Internal Medicine [address] INTERNAL MED     Signatures   Electronically signed by : Caitlin Weston, [facility]; Mar 30 2016  3:07PM EST                       (Author)    Electronically signed by : Cecelia Welch MD; Mar 30 2016  4:19PM EST

## 2018-01-18 NOTE — PROGRESS NOTES
Assessment    1  Bleeding from varicose veins of left lower extremity (454 8) (H57 329)   2  Varicose veins (454 9) (I86 8)   3  Spider veins (448 1) (I78 1)    Plan    1  Apply moisturizing cream or lotion several times a day ; Status:Complete;   Done:   93GRU5404   2  Support stockings can help keep the blood from pooling in the small veins in your feet   and legs ; Status:Complete;   Done: 90KIZ6169   3  We recommend that you use a pneumatic compression device on your leg ;   Status:Complete;   Done: 67CID9152   4  Follow-up visit in 1 month Evaluation and Treatment  Follow-up  Status: Complete  Done:   60CRZ1090    Chief Complaint  Chief Complaint Free Text Note Form: " I have varicose veins "   Ms Efrain Bains is here today to have her legs evaluated  She c/o of bilateral lower extremity bulging varicose veins x40 years  She states she has been treated in the past for her veins but it has been some time  She c/o bilateral lower extremity edema but associates that with lymphedema  She states she did have a bleeding varicose vein in the left leg in April 2016  She does have a hx of left leg vein ligation x15 years ago  She offers no further concerns at this time  SHe is a current smoker and reports smoking roughly a pack per day  History of Present Illness  Free Text HPI: She c/o of bilateral lower extremity bulging varicose veins x40 years  She states she has been treated in the past for her veins but it has been some time  She c/o bilateral lower extremity edema but associates that with lymphedema  She states she did have a bleeding varicose vein in the left leg in April 2016  She does have a hx of left leg vein ligation x15 years ago  She offers no further concerns at this time  SHe is a current smoker and reports smoking roughly a pack per day  Her last lower extremity venous surgical procedure was probably 12 years ago   (Dr ROSA) previously she had several surgical procedures by Dr Osmar Wise at [facility]  She does have lymphedema pumps but has not been wearing them recently  She also has gradient compression stockings but has not worn them in some time as they were ineffective at reducing her lymphedema  Her bleeding episodes occurred from the same cluster of spider veins but the third episode most recently was the most severe  She reports that she had undergone cauterization in the emergency room at [facility]  Review of Systems  Complete Female - Vasc:   Constitutional: No fever or chills, feels well, no tiredness, no recent weight gain or weight loss  Eyes: No sudden vision loss, no blurred vision, no double vision  ENT: no loss of hearing, no nosebleeds, no hoarseness  Cardiovascular: irregular heart rate, no painful veins and no leg pain with walking, but no chest pain, no intermittent leg claudication, no palpitations and bleeding veins  Respiratory: No sob, no wheezing, no cough, no sob with exertion, no orthopnea  Gastrointestinal: No nausea, No vomiting, no diarrhea, no blood in stool  Genitourinary: no dysuria, no Hematuria,no urinary incontinence  Musculoskeletal: no limb pain, no limb swelling  Integumentary: no ulcers, but no rashes, no itching, no skin lesions and no skin wound  Neurological: no dementia, but no numbness, no confusion, no dizziness, no limb weakness, no convulsions, no fainting and no difficulty walking  Psychiatric: no depression, no mood disorders, no anxiety  Hematologic/Lymphatic: no bleeding disorder, no easy bruising  ROS Reviewed:   ROS reviewed  Active Problems    1  Bicipital tendinitis of right shoulder (726 12) (M75 21)   2  Bleeding from varicose veins of left lower extremity (454 8) (P03 535)   3  Carpal tunnel syndrome of right wrist (354 0) (G56 01)   4  Cellulitis of left lower extremity (682 6) (L03 116)   5  Cervical disc disorder with radiculopathy (723 4) (M50 10)   6   Chronic cluster headache, not intractable (339 02) (G44 029)   7  Cluster headaches (339 00) (G44 009)   8  Lymphedema (457 1) (I89 0)   9  Need for influenza vaccination (V04 81) (Z23)   10  Need for pneumococcal vaccine (V03 82) (Z23)   11  Need for Tdap vaccination (V06 1) (Z23)   12  Spider veins (448 1) (I78 1)   13  Strain of foot, right (845 10) (S96 911A)   14  Strain of neck muscle, initial encounter (847 0) (S16 1XXA)   15  Tendinitis of right rotator cuff (726 10) (M75 81)   16  Varicose veins (454 9) (I86 8)    Past Medical History    1  History of Colon polyps (211 3) (K63 5)  Active Problems And Past Medical History Reviewed: The active problems and past medical history were reviewed and updated today  Surgical History  Surgical History Reviewed: The surgical history was reviewed and updated today  Family History  Mother    1  Family history of   Father    2  Family history of cardiac disorder (V17 49) (Z82 49)  Sister    3  Family history of Cancer of appendix   4  Family history of    5  Family history of malignant neoplasm of female breast (V16 3) (Z80 3)  Family History    6  Denied: Family history of mental disorder   7  Denied: Family history of substance abuse  Family History Reviewed: The family history was reviewed and updated today  Social History    · Brushes teeth twice a day   · Current every day smoker (305 1) (F17 200)   · Dental care, regularly   · Full-time employment   ·    · No alcohol use   · No drug use   · Two children  Social History Reviewed: The social history was reviewed and updated today  Current Meds   1  Amitriptyline HCl - 25 MG Oral Tablet; take 2 tablets at bedtime; Therapy: 66JSI2002 to (Evaluate:47Uqg9438)  Requested for: 85Kds2316; Last   Rx:36Ynr4632 Ordered   2  Topiramate 25 MG Oral Tablet; take 2 tablets at bedtime; Therapy: 21KSY0824 to (Evaluate:45Pgm9900)  Requested for: 06Fik4016; Last   Rx:73Ypg8263 Ordered    Allergies    1   No Known Drug Allergies    2  No Known Environmental Allergies   3  No Known Food Allergies    Vitals  Vital Signs    Recorded: 81ILJ3081 90:13GG   Systolic 656, LUE, Sitting   Diastolic 76, LUE, Sitting   Heart Rate 68, L Radial   Pulse Quality Normal, L Radial   Respiration Quality Normal   Respiration 16   Height 5 ft 11 in   Weight 248 lb 9 oz   BMI Calculated 34 67   BSA Calculated 2 31     Results/Data  Diagnostic Studies Reviewed Vasc:   Vascular Studies Reviewed: Recent lower extremity venous duplex at [facility] showed no evidence of deep vein thrombosis  Physical Exam    Carotid: right 2+, no bruit heard on the right, left 2+ and no bruit on the left  Brachial: right 2+ and left 2+  Radial: right 2+ and left 2+  Femoral: right 2+, no bruit heard on the right, left 2+ and no bruit on the left  Popliteal: right 2+ and left 2+  Posterior tibialis: right 2+ and left 2+  Dorsalis pedis: right 2+ and left 2+  Distal Pulse Exam: Normal Capillary Refill  Extremities: bilateral ankle ~U+ pitting edema, bilateral pretibial ~U+ pitting edema and no foot edema  LE Varicose Veins: right leg ~U+ truncal veins, left leg ~U+ truncal veins, right leg ~U+ reticular veins, left leg ~U+ reticular veins, right leg 4+ spider veins and left leg 4+ spider veins  Spider veins over the anterolateral left ankle appear to be sclerosed with palpable phlebitic changes both in the area of prior bleeding and extending proximally over the anterior compartment  Future Appointments    Date/Time Provider Specialty Site   07/26/2016 02:45 PM Carmen Kerns MD Vascular Surgery THE VASCULAR CENTER  [facility]   08/30/2016 03:00 PM ROGER Abad   Orthopedic Surgery [facility] REH[facility]IL[facility] [place] [facility]   11/02/2016 01:55 PM Felecia Whyte MD Neurology [facility] [address] [place] [address]   03/31/2017 11:20 AM Fredis Gandara MD Internal Medicine [facility] INTERNAL MED     Signatures Electronically signed by : Darien Hernandez MD; Jul 26 2016  4:01PM EST

## 2018-01-18 NOTE — RESULT NOTES
Verified Results  * XR FOOT 3+ VIEW LEFT 30DTQ1956 10:06AM [employer] Order Number: [id number]     Test Name Result Flag Reference   XR FOOT 3+ VW LEFT (Report)     LEFT FOOT     INDICATION: Pain in the left foot     COMPARISON: None     VIEWS: 3     IMAGES: 3     FINDINGS:     There is no acute fracture or dislocation  No degenerative changes  No lytic or blastic lesions are seen  Soft tissues are unremarkable  IMPRESSION:     No acute displaced fracture or dislocation seen   Small plantar calcaneal spur seen       Workstation performed: [id number]     Signed by:   Bishnu Villarreal MD   7/6/17     * XR HIP/PELV 2-3 VWS RIGHT W PELVIS IF PERFORMED 66YYO4878 10:06AM [employer] Order Number: [id number]     Test Name Result Flag Reference   * XR HIP/PELV 2-3 VWS RIGHT (Report)     RIGHT HIP     INDICATION: Right hip pain  COMPARISON: None     VIEWS: AP pelvis and 2 coned down views of the hip     IMAGES: 3     FINDINGS:     There is no acute fracture or dislocation  There is mild to moderate narrowing of the right hip joint space  There are small marginal osteophytes at the superior and inferior acetabulum  Mild to moderate arthritic changes of the left hip joint are also present  No lytic or blastic lesions are seen  Soft tissues are unremarkable  IMPRESSION:     No acute osseous abnormality  Mild to moderate degenerative changes of the right hip         Workstation performed: [id number]     Signed by:   Gillian Saucedo MD   7/6/17

## 2018-01-18 NOTE — MISCELLANEOUS
Message   Recorded as Task   Date: 09/13/2017 03:53 PM, Created By: Darien Bonner   Task Name: Care Coordination   Assigned To: Bogdan Alfaro   Regarding Patient: Martin Bruno, Status: Active   CommentNicolasa Velazquez - 13 Sep 2017 3:53 PM     TASK CREATED  Caller: Self; [phone] (Home)  Niraj Dyson called from [facility] wanted a bilat breast us for screening? order faxed        Active Problems    1  Asymptomatic postmenopausal state (V49 81) (Z78 0)   2  Back pain, lumbosacral (724 2,724 6) (M54 5)   3  Bicipital tendinitis of right shoulder (726 12) (M75 21)   4  Carpal tunnel syndrome of right wrist (354 0) (G56 01)   5  Cervical disc disorder with radiculopathy (723 4) (M50 10)   6  Chronic cluster headache, not intractable (339 02) (G44 029)   7  Chronic venous hypertension involving both sides (459 30) (I87 303)   8  Dense breasts (793 82) (R92 2)   9  Encounter for gynecological examination with abnormal finding (V72 31) (Z01 411)   10  Encounter for screening mammogram for malignant neoplasm of breast (V76 12)    (Z12 31)   11  Foot pain, left (729 5) (M79 672)   12  Lymphedema (457 1) (I89 0)   13  Need for hepatitis C screening test (V73 89) (Z11 59)   14  Right hip pain (719 45) (M25 551)   15  Screening for cardiovascular condition (V81 2) (Z13 6)   16  Screening for diabetes mellitus (V77 1) (Z13 1)   17  Spider veins (448 1) (I78 1)   18  Varicose veins (454 9) (I83 90)   19  Visit for screening mammogram (V76 12) (Z12 31)    Current Meds   1  Amitriptyline HCl - 25 MG Oral Tablet; TAKE 2 TABLET Bedtime  Requested for:   07ZEY1564; Last Rx:23May2017 Ordered   2  Eletriptan Hydrobromide 40 MG Oral Tablet (Relpax); TAKE 1 TABLET AT ONSET OF   MIGRAINE  MAY REPEAT ONCE AFTER 2 HOURS  MAX 2 DOSES/24 HOURS; Therapy: 63UYN0507 to (Evaluate:53Yew2227)  Requested for: 13Sep2017; Last   Rx:13Sep2017 Ordered   3  Gabapentin 300 MG Oral Capsule; take 1 capsule twice a day;    Therapy: 65RDH8134 to (Evaluate:63Nnk3305)  Requested for: 06AHN9622; Last   Rx:78Zpr3379 Ordered   4  Hydrocodone-Acetaminophen 5-325 MG Oral Tablet; TAKE 1 TABLET 3 times daily PRN; Therapy: 24MSQ6561 to (Evaluate:52Ojs1444); Last Rx:02Nov2016 Ordered   5  Topiramate 100 MG Oral Tablet (Topamax); TAKE 1 TABLET AT BEDTIME; Therapy: 42QEM9924 to (Evaluate:88Mkm4547)  Requested for: 26Gui8076; Last   Rx:13Sep2017 Ordered   6  Verapamil HCl  MG Oral Capsule Extended Release 24 Hour; TAKE 1 CAPSULE   EVERY DAY; Therapy: 11LTP8714 to (Evaluate:13Jun2017)  Requested for: 81AKF3832; Last   Rx:34Dex5024 Ordered    Allergies    1  No Known Drug Allergies    2  No Known Environmental Allergies   3   No Known Food Allergies    Signatures   Electronically signed by : Sanchez Randall, ; Sep 13 2017  3:53PM EST                       (Author)

## 2018-01-22 VITALS
DIASTOLIC BLOOD PRESSURE: 80 MMHG | TEMPERATURE: 98.7 F | BODY MASS INDEX: 35.58 KG/M2 | SYSTOLIC BLOOD PRESSURE: 110 MMHG | OXYGEN SATURATION: 98 % | WEIGHT: 248 LBS | HEART RATE: 84 BPM

## 2018-01-23 NOTE — MISCELLANEOUS
Message  Return to work or school:   Shawnee Schlatter is under my professional care  She was seen in my office on 12/15/17   She is able to return to work on  12/18/17      Please excuse Peri Gear from work 12/15-12/17/17 due to an acute illness  Emeterio Ganser, PA-C  Past Medical History  Active Problems And Past Medical History Reviewed: The active problems and past medical history were reviewed and updated today  Family History  Family History Reviewed: The family history was reviewed and updated today  Surgical History  Surgical History Reviewed: The surgical history was reviewed and updated today         Future Appointments    Signatures   Electronically signed by : Petr Aguilar, [facility]; Dec 15 2017  2:20PM EST                       (Author)    Electronically signed by : Ayush Qureshi MD; Dec 15 2017  2:34PM EST

## 2018-01-23 NOTE — MISCELLANEOUS
Message  Return to work or school:   Lewis Yee is under my professional care  She was seen in my office on 1/10/18       Please excuse Yi Jones from worm 1/10/18 through 1/15/18 due to an acute illness  Grecia Martinez PA-C  Past Medical History  Active Problems And Past Medical History Reviewed: The active problems and past medical history were reviewed and updated today  Family History  Family History Reviewed: The family history was reviewed and updated today  Surgical History  Surgical History Reviewed: The surgical history was reviewed and updated today         Future Appointments    Signatures   Electronically signed by : Kath Kim, [facility]; Isrrael 10 2018  8:33AM EST                       (Author)    Electronically signed by : Jaclyn Sheikh MD; Isrrael 10 2018 10:13AM EST

## 2018-01-24 VITALS
WEIGHT: 249 LBS | SYSTOLIC BLOOD PRESSURE: 128 MMHG | HEART RATE: 98 BPM | OXYGEN SATURATION: 99 % | HEIGHT: 70 IN | DIASTOLIC BLOOD PRESSURE: 80 MMHG | BODY MASS INDEX: 35.65 KG/M2

## 2018-01-24 VITALS
SYSTOLIC BLOOD PRESSURE: 120 MMHG | WEIGHT: 250 LBS | BODY MASS INDEX: 35.79 KG/M2 | OXYGEN SATURATION: 98 % | HEART RATE: 90 BPM | HEIGHT: 70 IN | DIASTOLIC BLOOD PRESSURE: 82 MMHG

## 2018-01-30 RX ORDER — ELETRIPTAN HYDROBROMIDE 40 MG/1
1 TABLET, FILM COATED ORAL
COMMUNITY
Start: 2017-02-17 | End: 2018-08-17 | Stop reason: SDUPTHER

## 2018-01-30 RX ORDER — IBUPROFEN 600 MG/1
TABLET ORAL
Refills: 1 | COMMUNITY
Start: 2018-01-10 | End: 2019-03-27 | Stop reason: ALTCHOICE

## 2018-01-30 RX ORDER — CEPHALEXIN 500 MG/1
CAPSULE ORAL 3 TIMES DAILY
Refills: 0 | COMMUNITY
Start: 2018-01-15 | End: 2018-04-02 | Stop reason: HOSPADM

## 2018-01-30 RX ORDER — GABAPENTIN 300 MG/1
1 CAPSULE ORAL 2 TIMES DAILY
COMMUNITY
Start: 2017-03-22 | End: 2018-08-07 | Stop reason: SDUPTHER

## 2018-01-30 RX ORDER — TOPIRAMATE 25 MG/1
TABLET ORAL
COMMUNITY
Start: 2017-01-17 | End: 2018-04-02 | Stop reason: HOSPADM

## 2018-01-30 RX ORDER — VERAPAMIL HYDROCHLORIDE 240 MG/1
0.5 CAPSULE, EXTENDED RELEASE ORAL DAILY
COMMUNITY
Start: 2017-03-15 | End: 2018-08-28 | Stop reason: SDUPTHER

## 2018-01-30 RX ORDER — HYDROCODONE BITARTRATE AND ACETAMINOPHEN 5; 325 MG/1; MG/1
1 TABLET ORAL EVERY 6 HOURS PRN
COMMUNITY
Start: 2016-11-02 | End: 2022-08-04

## 2018-01-30 RX ORDER — AMITRIPTYLINE HYDROCHLORIDE 25 MG/1
2 TABLET, FILM COATED ORAL
COMMUNITY
Start: 2017-12-14 | End: 2018-12-26 | Stop reason: SDUPTHER

## 2018-01-31 ENCOUNTER — OFFICE VISIT (OUTPATIENT)
Dept: VASCULAR SURGERY | Facility: CLINIC | Age: 53
End: 2018-01-31
Payer: COMMERCIAL

## 2018-01-31 VITALS
BODY MASS INDEX: 35.34 KG/M2 | HEART RATE: 72 BPM | DIASTOLIC BLOOD PRESSURE: 80 MMHG | TEMPERATURE: 97.6 F | HEIGHT: 71 IN | SYSTOLIC BLOOD PRESSURE: 138 MMHG | WEIGHT: 252.4 LBS

## 2018-01-31 DIAGNOSIS — I80.01 THROMBOPHLEBITIS OF SUPERFICIAL VEINS OF RIGHT LOWER EXTREMITY: Primary | ICD-10-CM

## 2018-01-31 DIAGNOSIS — I89.0 LYMPHEDEMA: ICD-10-CM

## 2018-01-31 DIAGNOSIS — I87.2 CHRONIC VENOUS INSUFFICIENCY: ICD-10-CM

## 2018-01-31 PROCEDURE — 99213 OFFICE O/P EST LOW 20 MIN: CPT | Performed by: NURSE PRACTITIONER

## 2018-01-31 NOTE — PATIENT INSTRUCTIONS
Varicose Veins   WHAT YOU NEED TO KNOW:   Varicose veins are veins that become large, twisted, and swollen  They are common on the back of the calves, knees, and thighs  Varicose veins are caused by valves in your veins that do not work properly  This causes blood to collect and increase pressure in the veins of your legs  The increased pressure causes your veins to stretch, get larger, swell, and twist        DISCHARGE INSTRUCTIONS:   Return to the emergency department if:   · You have a wound that does not heal or is infected  · You have an injury that has broken your skin and caused your varicose veins to bleed  · Your leg is swollen and hard  · You have pain in your leg that does not go away or gets worse  · You notice that your legs or feet are turning blue or black  · Your leg feels warm, tender, and painful  It may look swollen and red  Contact your healthcare provider if:   · Your symptoms get worse or they keep you from doing your daily activities  · You have an injury that has caused your varicose veins to bleed underneath your skin  · You have a rash on your leg  · Your symptoms keep you from doing your daily activities  · You have questions or concerns about your condition or care  Medicines:   · Prescription pain medicine  may be given  Ask how to take this medicine safely  · Take your medicine as directed  Contact your healthcare provider if you think your medicine is not helping or if you have side effects  Tell him or her if you are allergic to any medicine  Keep a list of the medicines, vitamins, and herbs you take  Include the amounts, and when and why you take them  Bring the list or the pill bottles to follow-up visits  Carry your medicine list with you in case of an emergency  Follow up with your healthcare provider as directed:  Write down your questions so you remember to ask them during your visits  Manage varicose veins:   · Wear pressure stockings    The stockings are tight and put pressure on your legs  They improve blood flow and help prevent clots  · Elevate your legs  Keep them above the level of your heart for 15 to 30 minutes several times a day  This will help blood to flow back to your heart  · Do not sit or stand for long periods of time  This can cause the blood to collect in your legs and make your symptoms worse  Walk around for a few minutes every hour to get blood moving in your legs  · Do not wear tight clothing or shoes  Do not wear high-heeled shoes  Do not wear clothes that are tight around the waist     · Get plenty of exercise  Talk to your healthcare provider about the best exercise plan for you  Exercise can decrease your blood pressure and improve your health  Bend or rotate your ankles several times every hour  This will help blood to flow back to the heart  · Maintain a healthy weight  Your heart works harder when you are overweight  This can make varicose vein worse  Ask your healthcare provider how much you should weigh  Ask him to help you create a weight loss plan if you are overweight  · Do not smoke  Nicotine and other chemicals in cigarettes and cigars can cause blood vessel damage  Ask your healthcare provider for information if you currently smoke and need help to quit  E-cigarettes or smokeless tobacco still contain nicotine  Talk to your healthcare provider before you use these products  © 2017 [address] [employer] is for End User's use only and may not be sold, redistributed or otherwise used for commercial purposes  All illustrations and images included in CareNotes® are the copyrighted property of A D A M , [employer]  or [employer]  The above information is an  only  It is not intended as medical advice for individual conditions or treatments   Talk to your doctor, nurse or pharmacist before following any medical regimen to see if it is safe and effective for you

## 2018-01-31 NOTE — PROGRESS NOTES
Assessment/Plan:  45year old female with chronic venous insufficiency, lymphedema, history of vein stripping who presents for evaluation of right distal thigh superficial thrombophlebitis x3-4 weeks  1  Right distal thigh superficial thrombophlebitis  Symptoms mostly resolved at this point  Slight tenderness in one area on palpation  Recommended ASA 81mg daily for overall cardiovascular health  Compression, warm compresses to any tender areas and periodic leg elevation  Follow up in the office if symptoms do not resolve or worsen  2  Chronic venous insufficiency  Consistent use of compression, periodic leg elevation, moisturizers and exercise/weightloss  3  Lymphedema  Use compression consistently  She needs to contact the distributor for her pumps for new sleeves  4  Smoking cessation  No problem-specific Assessment & Plan notes found for this encounter  Problem List Items Addressed This Visit        Cardiovascular and Mediastinum    Thrombophlebitis of superficial veins of right lower extremity - Primary    Chronic venous insufficiency       Other    Lymphedema                Patient ID: Beryle Lands is a 45 y o  female  HPI    45year old female with chronic venous insufficiency, lymphedema, history of vein stripping who presents for evaluation of right distal thigh superficial thrombophlebitis x3-4 weeks after long drive to [facility]  She was seen by her PCP and referred to vascular  She has been applying warm compresses and completed a course of antibiotics for phlebitis  She notes a reduction and improvement in her initial symptoms  She also got fitted for new knee high compression  She inconsistently uses pneumatic compression pumps and notes that her current sleeves are too small  She previously obtained pumps from [facility] greater than 10 years ago  She was previously seen by Dr Linda Nix in 2016   She has varicose vein problems since early age of 13 and history of vein stripping many years ago    She is a current 1ppd smoker  She tells me she is up to date on mammography and colonoscopy  The following portions of the patient's history were reviewed and updated as appropriate: allergies, current medications, past family history, past medical history, past social history, past surgical history and problem list     Review of Systems   Constitutional: Negative  Respiratory: Negative  Cardiovascular: Negative  Varicose vein s   Skin:        Dry skin          Objective:     Physical Exam   Constitutional: She is oriented to person, place, and time  She appears well-developed  Cardiovascular: Normal rate, regular rhythm and normal heart sounds  Palpable cord right medial knee/distal thigh  Diffuse bilateral lower extremity spider telangiectasias  Excessively dry skin    Pulmonary/Chest: Effort normal and breath sounds normal    Neurological: She is alert and oriented to person, place, and time  Skin: Skin is dry  Vitals reviewed

## 2018-02-16 ENCOUNTER — OFFICE VISIT (OUTPATIENT)
Dept: OBGYN CLINIC | Facility: CLINIC | Age: 53
End: 2018-02-16
Payer: COMMERCIAL

## 2018-02-16 VITALS
HEIGHT: 71 IN | SYSTOLIC BLOOD PRESSURE: 124 MMHG | WEIGHT: 258 LBS | DIASTOLIC BLOOD PRESSURE: 80 MMHG | BODY MASS INDEX: 36.12 KG/M2

## 2018-02-16 DIAGNOSIS — Z12.31 ENCOUNTER FOR SCREENING MAMMOGRAM FOR MALIGNANT NEOPLASM OF BREAST: ICD-10-CM

## 2018-02-16 DIAGNOSIS — Z01.419 ENCOUNTER FOR ANNUAL ROUTINE GYNECOLOGICAL EXAMINATION: Primary | ICD-10-CM

## 2018-02-16 PROBLEM — R92.30 DENSE BREASTS: Status: ACTIVE | Noted: 2017-02-15

## 2018-02-16 PROBLEM — R92.2 DENSE BREASTS: Status: ACTIVE | Noted: 2017-02-15

## 2018-02-16 PROCEDURE — G0145 SCR C/V CYTO,THINLAYER,RESCR: HCPCS | Performed by: NURSE PRACTITIONER

## 2018-02-16 PROCEDURE — 99396 PREV VISIT EST AGE 40-64: CPT | Performed by: NURSE PRACTITIONER

## 2018-02-16 NOTE — PATIENT INSTRUCTIONS
Menopause   WHAT YOU NEED TO KNOW:   What is menopause? Menopause is a normal stage in a woman's life when her monthly periods stop  Menopause starts when the ovaries slowly stop making the female hormones estrogen and progesterone  After menopause, a woman is no longer able to become pregnant  A woman who has not had a period for a full year after the age of 39 is considered to be in menopause  Perimenopause is a stage before menopause that may cause signs and symptoms similar to menopause  Perimenopause can last an average of 4 to 5 years  What are the signs and symptoms of menopause? The signs and symptoms of menopause can be different from woman to woman:  · Menstrual period changes such as skipped periods or periods that are closer together, or lighter or heavier than usual    · Hot flashes (feeling warm, flushed, and sweaty)     · Mood changes such as irritability or decreased desire to have sex    · Breast changes such as tenderness or pain    · Hair changes such as thinning hair or increased hair on your face    · Vaginal changes such as increased dryness     · Urinary changes such as increased urinary tract infections (UTIs) or urgency (feeling that you need to urinate right away)    · Other symptoms such as headaches, trouble sleeping, fatigue, or heart palpitations (strong, fast heartbeats)  What do I need to know about menopause? · You can still get pregnant while you have periods  Continue to use birth control if you do not want to get pregnant  You may need to use birth control until it has been 1 year since your periods stopped  Ask your healthcare provider when you can stop using birth control to prevent pregnancy  · Hormone replacement therapy can be used to treat symptoms of menopause  Hormone replacement therapy (HRT) is medicine that replaces your low hormone levels  HRT contains estrogen and sometimes progestin  HRT has benefits and risks   HRT decreases your risk for bone fractures by helping to prevent osteoporosis  HRT also protects you from colon cancer  HRT may increase your risk for breast cancer, blood clots, heart disease, and stroke  Ask your healthcare provider if HRT is right for you  How can I live a healthy lifestyle during and after menopause? After menopause, your risk for heart disease and bone loss increases  Ask about these and other ways to stay healthy:  · Exercise regularly  Exercise helps you maintain a healthy weight  Exercise can also help to control your blood pressure and cholesterol levels  Include weight-bearing exercise for strong bones  Ask your healthcare provider about the best exercise plan for you  · Eat a variety of healthy foods  Include fruits, vegetables, whole grains (whole-wheat bread, pasta, and cereals), low-fat dairy, and lean protein foods (beans, poultry, and fish)  Limit foods high in sodium (salt)  Ask your healthcare provider for more information about a meal plan that is right for you  · Maintain a healthy weight  Check with your healthcare provider before you start any weight loss program      · Take supplements as directed  You may need extra calcium and vitamin D to help prevent osteoporosis  · Limit alcohol and caffeine  Alcohol and caffeine may worsen your symptoms  · Do not smoke  If you smoke, it is never too late to quit  You are more likely to have a heart attack, lung disease, blood clots, and cancer if you smoke  Ask your healthcare provider for information if you need help quitting  When should I contact my healthcare provider? · You have vaginal bleeding after menopause  · You have questions or concerns about your condition or care  CARE AGREEMENT:   You have the right to help plan your care  Learn about your health condition and how it may be treated  Discuss treatment options with your caregivers to decide what care you want to receive  You always have the right to refuse treatment   The above information is an  only  It is not intended as medical advice for individual conditions or treatments  Talk to your doctor, nurse or pharmacist before following any medical regimen to see if it is safe and effective for you  © 2017 [address] Information is for End User's use only and may not be sold, redistributed or otherwise used for commercial purposes  All illustrations and images included in CareNotes® are the copyrighted property of A D A M , [employer]  or [employer]

## 2018-02-16 NOTE — PROGRESS NOTES
Assessment/Plan:    No problem-specific Assessment & Plan notes found for this encounter  Diagnoses and all orders for this visit:    Encounter for annual routine gynecological examination  -     Liquid-based pap, screening    Encounter for screening mammogram for malignant neoplasm of breast  -     Mammo screening bilateral w 3d & cad        Call as needed, encouraged calcium/vit D supplementation, all questions answered    Subjective:      Patient ID: Bella Pham is a 45 y o  female  Pleasant 45 y o  postmenopausal female here for annual exam  She denies postmenopausal bleeding but has had some bleeding hemorrhoids recently  Declines pelvic u/s  Denies history of abnormal pap smears  Denies vaginal issues  Denies pelvic pain  Denies menopausal/postmenopausal issues  + smoker, PREFERS [facility], requests genetic counselor info for her son who is dating his second cousin  Gynecologic Exam   Pertinent negatives include no chills, constipation, diarrhea, dysuria, fever or hematuria  The following portions of the patient's history were reviewed and updated as appropriate:   She  has a past medical history of Cellulitis of left lower extremity; Cluster headaches; Colon polyps; Lymph edema; and Varicose veins of legs  She  does not have any pertinent problems on file  She  has a past surgical history that includes Back surgery; Vein ligation and stripping (Left); pr revise median n/carpal tunnel surg (Right, 9/12/2016); and Tubal ligation  Her family history includes Breast cancer in her maternal aunt and sister; Cancer in her sister; Cervical cancer in her maternal grandmother; Heart disease in her father; Migraines in her daughter; Other in her maternal grandmother; Varicose Veins in her father  She  reports that she has been smoking  She has been smoking about 1 00 pack per day  She has never used smokeless tobacco  She reports that she drinks alcohol   She reports that she does not use drugs   Current Outpatient Prescriptions   Medication Sig Dispense Refill    amitriptyline (ELAVIL) 25 mg tablet Take 2 tablets by mouth      eletriptan (RELPAX) 40 MG tablet Take 1 tablet by mouth      HYDROcodone-acetaminophen (NORCO) 5-325 mg per tablet Take 1 tablet by mouth 3 (three) times a day      ibuprofen (MOTRIN) 600 mg tablet TAKE 1 TABLET 3 TIMES DAILY WITH MEALS   1    oxyCODONE (ROXICODONE) 5 mg immediate release tablet Earliest Fill Date: 9/12/16  1-2 tablets every 4 hours as needed for pain 40 tablet 0    topiramate (TOPAMAX) 25 mg tablet Take by mouth      verapamil (VERELAN) 240 MG 24 hr capsule Take 1 capsule by mouth daily      cephalexin (KEFLEX) 500 mg capsule 3 (three) times a day  0    gabapentin (NEURONTIN) 300 mg capsule Take 1 capsule by mouth 2 (two) times a day      topiramate (TOPAMAX) 25 mg tablet Take 75 mg by mouth daily at bedtime  No current facility-administered medications for this visit  Current Outpatient Prescriptions on File Prior to Visit   Medication Sig    amitriptyline (ELAVIL) 25 mg tablet Take 2 tablets by mouth    eletriptan (RELPAX) 40 MG tablet Take 1 tablet by mouth    HYDROcodone-acetaminophen (NORCO) 5-325 mg per tablet Take 1 tablet by mouth 3 (three) times a day    ibuprofen (MOTRIN) 600 mg tablet TAKE 1 TABLET 3 TIMES DAILY WITH MEALS   oxyCODONE (ROXICODONE) 5 mg immediate release tablet Earliest Fill Date: 9/12/16  1-2 tablets every 4 hours as needed for pain    topiramate (TOPAMAX) 25 mg tablet Take by mouth    verapamil (VERELAN) 240 MG 24 hr capsule Take 1 capsule by mouth daily    cephalexin (KEFLEX) 500 mg capsule 3 (three) times a day    gabapentin (NEURONTIN) 300 mg capsule Take 1 capsule by mouth 2 (two) times a day    topiramate (TOPAMAX) 25 mg tablet Take 75 mg by mouth daily at bedtime  No current facility-administered medications on file prior to visit  She has No Known Allergies       Review of Systems Constitutional: Negative for chills, fatigue, fever and unexpected weight change  Respiratory: Negative for shortness of breath  Gastrointestinal: Negative for anal bleeding, blood in stool, constipation and diarrhea  Genitourinary: Negative for difficulty urinating, dysuria and hematuria  Objective:      /80 (BP Location: Right arm, Patient Position: Sitting)   Ht 5' 11" (1 803 m)   Wt 117 kg (258 lb)   LMP 01/01/2015   Breastfeeding? No   BMI 35 98 kg/m²          Physical Exam   Constitutional: She appears well-developed and well-nourished  No distress  HENT:   Head: Normocephalic  Neck: Normal range of motion  Neck supple  Pulmonary/Chest: Effort normal  Right breast exhibits no inverted nipple, no mass, no nipple discharge, no skin change and no tenderness  Left breast exhibits no inverted nipple, no mass, no nipple discharge, no skin change and no tenderness  Breasts are symmetrical    Abdominal: Soft  Genitourinary: No breast swelling, tenderness, discharge or bleeding  Pelvic exam was performed with patient supine  No labial fusion  There is no rash, tenderness, lesion or injury on the right labia  There is no rash, tenderness, lesion or injury on the left labia  Uterus is not deviated, not enlarged, not fixed and not tender  Cervix exhibits no motion tenderness, no discharge and no friability  Right adnexum displays no mass, no tenderness and no fullness  Left adnexum displays no mass, no tenderness and no fullness  No erythema or tenderness in the vagina  No foreign body in the vagina  No signs of injury around the vagina  No vaginal discharge found  Lymphadenopathy:        Right: No inguinal adenopathy present  Left: No inguinal adenopathy present

## 2018-02-22 LAB
LAB AP GYN PRIMARY INTERPRETATION: NORMAL
Lab: NORMAL

## 2018-04-02 ENCOUNTER — OFFICE VISIT (OUTPATIENT)
Dept: INTERNAL MEDICINE CLINIC | Facility: CLINIC | Age: 53
End: 2018-04-02
Payer: COMMERCIAL

## 2018-04-02 VITALS
SYSTOLIC BLOOD PRESSURE: 128 MMHG | OXYGEN SATURATION: 98 % | HEART RATE: 104 BPM | DIASTOLIC BLOOD PRESSURE: 74 MMHG | RESPIRATION RATE: 18 BRPM | BODY MASS INDEX: 35.06 KG/M2 | HEIGHT: 71 IN | WEIGHT: 250.4 LBS | TEMPERATURE: 99.9 F

## 2018-04-02 DIAGNOSIS — R05.9 COUGH: ICD-10-CM

## 2018-04-02 DIAGNOSIS — I83.93 VARICOSE VEINS OF LEGS: ICD-10-CM

## 2018-04-02 DIAGNOSIS — J20.9 ACUTE BRONCHITIS WITH BRONCHOSPASM: Primary | ICD-10-CM

## 2018-04-02 PROCEDURE — 99213 OFFICE O/P EST LOW 20 MIN: CPT | Performed by: PHYSICIAN ASSISTANT

## 2018-04-02 RX ORDER — AZITHROMYCIN 250 MG/1
TABLET, FILM COATED ORAL
Qty: 6 TABLET | Refills: 0 | Status: SHIPPED | OUTPATIENT
Start: 2018-04-02 | End: 2018-04-06

## 2018-04-02 RX ORDER — BENZONATATE 100 MG/1
100 CAPSULE ORAL 3 TIMES DAILY PRN
Qty: 30 CAPSULE | Refills: 0 | Status: SHIPPED | OUTPATIENT
Start: 2018-04-02 | End: 2019-01-28 | Stop reason: ALTCHOICE

## 2018-04-02 NOTE — PROGRESS NOTES
Assessment/Plan:  Patient Instructions   Rest, increase fluids, Tylenol for fever or aches as per package instructions  Gargle with warm salt water 3-4 times daily for comfort  Start in finished antibiotic  Can use cough capsules as needed for cough  Keep me informed if not steadily improving  Will also give referral to another vascular surgeon for opinion of painful varicose veins  Followup scheduled per orders  Diagnoses and all orders for this visit:    Acute bronchitis with bronchospasm  -     azithromycin (ZITHROMAX) 250 mg tablet; Take 2 tablets today then 1 tablet daily x 4 days    Cough  -     benzonatate (TESSALON PERLES) 100 mg capsule; Take 1 capsule (100 mg total) by mouth 3 (three) times a day as needed for cough    Varicose veins of legs  -     Ambulatory referral to Vascular Surgery; Future          Subjective:      Patient ID: Tova Asif is a 45 y o  female  Acute visit    Patient is not felt well for over 1 week  She initially started with scratchy throat runny nose and some sneezing  She thought it was allergy symptoms so did take over-the-counter DayQuil and NyQuil which did help at that time for her symptoms  However she began with a cough that is now tight and she can hear herself wheeze  She has some shortness of breath on exertion  She has felt feverish but has not taken her temperature  She does admit to some sinus pressure in her cheeks and nose area  She also complains of postnasal drainage  Patient states she gets in to coughing spasms          ALLERGIES:  No Known Allergies    CURRENT MEDICATIONS:    Current Outpatient Prescriptions:     amitriptyline (ELAVIL) 25 mg tablet, Take 2 tablets by mouth, Disp: , Rfl:     eletriptan (RELPAX) 40 MG tablet, Take 1 tablet by mouth, Disp: , Rfl:     HYDROcodone-acetaminophen (NORCO) 5-325 mg per tablet, Take 1 tablet by mouth 3 (three) times a day, Disp: , Rfl:     ibuprofen (MOTRIN) 600 mg tablet, TAKE 1 TABLET 3 TIMES DAILY WITH MEALS , Disp: , Rfl: 1    oxyCODONE (ROXICODONE) 5 mg immediate release tablet, Earliest Fill Date: 9/12/16  1-2 tablets every 4 hours as needed for pain, Disp: 40 tablet, Rfl: 0    verapamil (VERELAN) 240 MG 24 hr capsule, Take 1 capsule by mouth daily, Disp: , Rfl:     azithromycin (ZITHROMAX) 250 mg tablet, Take 2 tablets today then 1 tablet daily x 4 days, Disp: 6 tablet, Rfl: 0    benzonatate (TESSALON PERLES) 100 mg capsule, Take 1 capsule (100 mg total) by mouth 3 (three) times a day as needed for cough, Disp: 30 capsule, Rfl: 0    gabapentin (NEURONTIN) 300 mg capsule, Take 1 capsule by mouth 2 (two) times a day, Disp: , Rfl:     topiramate (TOPAMAX) 25 mg tablet, Take 75 mg by mouth daily at bedtime  , Disp: , Rfl:     ACTIVE PROBLEM LIST:  Patient Active Problem List   Diagnosis    Thrombophlebitis of superficial veins of right lower extremity    Chronic venous insufficiency    Lymphedema    Back pain, lumbosacral    Dense breasts    Acute bronchitis with bronchospasm    Cough    Varicose veins of legs       PAST MEDICAL HISTORY:  Past Medical History:   Diagnosis Date    Cellulitis of left lower extremity     last assessed    3/30/16    resolved  Speedy Rower   9/8/16      Cluster headaches     Colon polyps     Lymph edema     Varicose veins of legs        PAST SURGICAL HISTORY:  Past Surgical History:   Procedure Laterality Date    BACK SURGERY      AL REVISE MEDIAN N/CARPAL TUNNEL SURG Right 9/12/2016    Procedure: WRIST OPEN CARPAL TUNNEL RELEASE ;  Surgeon: Conor Carrion MD;  [facility];  Service: Orthopedics    TUBAL LIGATION      VEIN LIGATION AND STRIPPING Left        FAMILY HISTORY:  Family History   Problem Relation Age of Onset    Varicose Veins Father     Heart disease Father      cardiac disorder    Breast cancer Sister      neoplasm    Other Maternal Grandmother      gyn problem    Cervical cancer Maternal Grandmother     Breast cancer Maternal Aunt      neoplasm    Migraines Daughter     Cancer Sister      Appendix    Colon cancer Neg Hx     Ovarian cancer Neg Hx     Uterine cancer Neg Hx        SOCIAL HISTORY:  Social History     Social History    Marital status: /Civil Union     Spouse name: N/A    Number of children: 2    Years of education: N/A     Occupational History          full time - post [employer]     Social History Main Topics    Smoking status: Current Every Day Smoker     Packs/day: 1 00    Smokeless tobacco: Never Used      Comment: 1 ppd x 40 yrs    Alcohol use Yes      Comment: on occasion / social     Drug use: No    Sexual activity: Yes     Birth control/ protection: Surgical     Other Topics Concern    Not on file     Social History Narrative    Brushes teeth twice a day    Dental care regularly       Review of Systems   Constitutional: Positive for chills, fatigue and fever  Negative for activity change  HENT: Positive for congestion, postnasal drip, rhinorrhea, sinus pressure, sneezing and sore throat  Negative for ear pain  Eyes: Negative for discharge  Respiratory: Positive for cough, chest tightness and wheezing  Negative for shortness of breath  Cardiovascular: Negative for chest pain, palpitations and leg swelling  Gastrointestinal: Negative for abdominal pain, constipation, diarrhea, nausea and vomiting  Genitourinary: Negative for difficulty urinating  Musculoskeletal: Negative for arthralgias and myalgias  Skin: Negative for rash  Allergic/Immunologic: Negative for immunocompromised state  Neurological: Negative for dizziness, syncope, weakness, light-headedness and headaches  Hematological: Negative for adenopathy  Does not bruise/bleed easily  Psychiatric/Behavioral: Negative for dysphoric mood  The patient is not nervous/anxious            Objective:  Vitals:    04/02/18 1245   BP: 128/74   BP Location: Left arm   Patient Position: Sitting   Cuff Size: Adult   Pulse: 104 Resp: 18   SpO2: 98%   Weight: 114 kg (250 lb 6 4 oz)   Height: 5' 11" (1 803 m)        Physical Exam   Constitutional: She is oriented to person, place, and time  She appears well-developed and well-nourished  No distress  HENT:   Erythema of posterior pharynx  Nasal mucosa is pale  Complains of sinus tenderness left maxillary and left ethmoid region  Eyes: Conjunctivae are normal    Neck: Neck supple  Cardiovascular: Normal rate, regular rhythm and normal heart sounds  Pulmonary/Chest: Effort normal  She has wheezes (Late inspiratory wheezes in the anterior fields, expiratory wheezing in the anterior fields  Forced expiration causes coughing spasms)  Abdominal: Soft  There is no tenderness  Musculoskeletal: She exhibits edema  Lymphadenopathy:     She has no cervical adenopathy  Neurological: She is alert and oriented to person, place, and time  Skin: Skin is warm and dry  No rash noted  Erythema: Chronic lymphedema  Psychiatric: She has a normal mood and affect  Her behavior is normal    Nursing note and vitals reviewed  RESULTS:    No results found for this or any previous visit (from the past 1008 hour(s))

## 2018-04-02 NOTE — PATIENT INSTRUCTIONS
Rest, increase fluids, Tylenol for fever or aches as per package instructions  Gargle with warm salt water 3-4 times daily for comfort  Start in finished antibiotic  Can use cough capsules as needed for cough  Keep me informed if not steadily improving  Will also give referral to another vascular surgeon for opinion of painful varicose veins

## 2018-04-12 ENCOUNTER — DOCUMENTATION (OUTPATIENT)
Dept: NEUROLOGY | Facility: CLINIC | Age: 53
End: 2018-04-12

## 2018-04-12 NOTE — PROGRESS NOTES
Dr Rubio Kidney,     patient call today she said she needs to talk to you about the Medication she is taking  She wants to know if she discontinues medication  Patient phone number is [phone]    Please advised  Thank you

## 2018-06-12 ENCOUNTER — TELEPHONE (OUTPATIENT)
Dept: NEUROLOGY | Facility: CLINIC | Age: 53
End: 2018-06-12

## 2018-08-06 ENCOUNTER — TELEPHONE (OUTPATIENT)
Dept: NEUROLOGY | Facility: CLINIC | Age: 53
End: 2018-08-06

## 2018-08-06 DIAGNOSIS — G44.021 INTRACTABLE CHRONIC CLUSTER HEADACHE: Primary | ICD-10-CM

## 2018-08-06 RX ORDER — METHYLPREDNISOLONE 4 MG/1
TABLET ORAL
Qty: 21 TABLET | Refills: 0 | Status: SHIPPED | OUTPATIENT
Start: 2018-08-06 | End: 2018-08-28

## 2018-08-06 NOTE — TELEPHONE ENCOUNTER
Called patient, Medrol Dosepak called in she will continue with her present medications and the headaches come back after the Medrol Dosepak she is advised to taper herself off the Topamax and to get back on Depakote

## 2018-08-06 NOTE — TELEPHONE ENCOUNTER
Patient states her "cluster headaches" are back, since July 1st    Only getting them in the evening, but yesterday did have one during the day, currently not experiencing a headache  Wakes her up at night  Denies nausea, no dizziness    Requesting a steroid to break the cycle   [phone]

## 2018-08-07 RX ORDER — GABAPENTIN 300 MG/1
300 CAPSULE ORAL 2 TIMES DAILY
Qty: 60 CAPSULE | Refills: 3 | Status: SHIPPED | OUTPATIENT
Start: 2018-08-07 | End: 2019-01-22 | Stop reason: ALTCHOICE

## 2018-08-07 NOTE — TELEPHONE ENCOUNTER
Called,ok to gabapentin 300 mg twice a day along with verapamil 120 mg daily and patient has lowered the dose of Topamax to 50 mg at bedtime  She will continue with Relpax on a p r n  basis

## 2018-08-07 NOTE — TELEPHONE ENCOUNTER
pt called and states that in the past she most recently used gabapentin not depakote  please advise  she does not believe that the depakote worked and thats why it was changed to gabapentin     [phone]

## 2018-08-17 DIAGNOSIS — G43.009 MIGRAINE WITHOUT AURA AND WITHOUT STATUS MIGRAINOSUS, NOT INTRACTABLE: Primary | ICD-10-CM

## 2018-08-20 RX ORDER — ELETRIPTAN HYDROBROMIDE 40 MG/1
TABLET, FILM COATED ORAL
Qty: 36 TABLET | Refills: 0 | Status: SHIPPED | OUTPATIENT
Start: 2018-08-20 | End: 2019-02-06 | Stop reason: SDUPTHER

## 2018-08-28 ENCOUNTER — OFFICE VISIT (OUTPATIENT)
Dept: NEUROLOGY | Facility: CLINIC | Age: 53
End: 2018-08-28
Payer: COMMERCIAL

## 2018-08-28 VITALS
WEIGHT: 251 LBS | BODY MASS INDEX: 35.14 KG/M2 | HEIGHT: 71 IN | HEART RATE: 88 BPM | SYSTOLIC BLOOD PRESSURE: 116 MMHG | DIASTOLIC BLOOD PRESSURE: 70 MMHG

## 2018-08-28 DIAGNOSIS — M54.50 BACK PAIN, LUMBOSACRAL: ICD-10-CM

## 2018-08-28 DIAGNOSIS — G44.021 INTRACTABLE CHRONIC CLUSTER HEADACHE: Primary | ICD-10-CM

## 2018-08-28 PROBLEM — G44.009 CLUSTER HEADACHES: Status: ACTIVE | Noted: 2018-08-28

## 2018-08-28 PROCEDURE — 99214 OFFICE O/P EST MOD 30 MIN: CPT | Performed by: PSYCHIATRY & NEUROLOGY

## 2018-08-28 RX ORDER — VERAPAMIL HYDROCHLORIDE 240 MG/1
240 CAPSULE, EXTENDED RELEASE ORAL DAILY
Qty: 90 CAPSULE | Refills: 3 | Status: SHIPPED | OUTPATIENT
Start: 2018-08-28 | End: 2019-02-18 | Stop reason: SDUPTHER

## 2018-08-28 RX ORDER — TOPIRAMATE 25 MG/1
100 TABLET ORAL
Qty: 360 TABLET | Refills: 3 | Status: SHIPPED | OUTPATIENT
Start: 2018-08-28 | End: 2019-02-08 | Stop reason: SDUPTHER

## 2018-08-28 NOTE — PROGRESS NOTES
Progress Note - Neurology   Leigh Whittaker 52 y o  female MRN: [id number]  Unit/Bed#:  Encounter: [id number]      Subjective:   Patient is here for a follow-up visit with a history of cluster headaches, chronic low back pain, and in the recent past has been experiencing frequent headaches on a day-to-day basis, generally relieved with Relpax in spite of being on 3 prophylactic agents  She was also tried on Medrol Dosepak 2 weeks ago with no relief of symptoms  Patient generally gets seasonal relief of symptoms and in the winter months is able to taper herself off the prophylactic agents  Patient has been on gabapentin topiramate Elavil and verapamil at this time  She denies any side effects from the medications  She uses hydrocodone only on a p r n  basis for her low back pain  Patient denies any new neurological symptoms or change in the characteristics of the headache  ROS:   Review of Systems   Constitutional: Negative  HENT: Negative  Eyes: Negative  Respiratory: Negative  Cardiovascular: Positive for leg swelling  Genitourinary: Negative  Musculoskeletal: Negative  Skin: Negative  Allergic/Immunologic: Negative  Neurological: Positive for headaches  Hematological: Negative  Psychiatric/Behavioral: Negative  Vitals:   Vitals:    08/28/18 1348   BP: 116/70   Pulse: 88   ,Body mass index is 35 01 kg/m²  MEDS:      Current Outpatient Prescriptions:     amitriptyline (ELAVIL) 25 mg tablet, Take 2 tablets by mouth, Disp: , Rfl:     benzonatate (TESSALON PERLES) 100 mg capsule, Take 1 capsule (100 mg total) by mouth 3 (three) times a day as needed for cough, Disp: 30 capsule, Rfl: 0    eletriptan (RELPAX) 40 MG tablet, TAKE 1 TABLET AT ONSET OF MIGRAINE, MAY REPEAT ONCE AFTER 2 HOURS   MAX 2 DOSES PER 24 HOURS, Disp: 36 tablet, Rfl: 0    gabapentin (NEURONTIN) 300 mg capsule, Take 1 capsule (300 mg total) by mouth 2 (two) times a day, Disp: 60 capsule, Rfl: 3   HYDROcodone-acetaminophen (NORCO) 5-325 mg per tablet, Take 1 tablet by mouth every 6 (six) hours as needed  , Disp: , Rfl:     ibuprofen (MOTRIN) 600 mg tablet, TAKE 1 TABLET 3 TIMES DAILY WITH MEALS , Disp: , Rfl: 1    verapamil (VERELAN) 240 MG 24 hr capsule, Take 0 5 capsules by mouth daily  , Disp: , Rfl:     topiramate (TOPAMAX) 25 mg tablet, Take 75 mg by mouth daily at bedtime  , Disp: , Rfl:   :    Physical Exam:  General appearance: alert, appears stated age and cooperative  Head: Normocephalic, without obvious abnormality, atraumatic    Neurologic:  No new changes were noted on cranial nerve, motor or sensory exam   No evidence of any dysmetria, her gait is normal based  Lab Results: I have personally reviewed pertinent reports  Imaging Studies: I have personally reviewed pertinent reports  Assessment:  1  Cluster headaches  2  Chronic lumbosacral strain with lumbar disc disease  Plan:  Patient is advised to increase the dose of topiramate to 100 mg at bedtime as well as verapamil to 240 mg daily  She was on these doses when her headache cycle had broken last year and is advised to maintain the same  Continue Relpax on a p r n  basis and patient will return back to see me in 2-3 months  She will call me in the next 1 week if she sees no relief of symptoms  8/28/2018,1:58 PM    Dictation voice to text software has been used in the creation of this document  Please consider this in light of any contextual or grammatical errors

## 2018-12-26 DIAGNOSIS — G44.009 CLUSTER HEADACHE, NOT INTRACTABLE, UNSPECIFIED CHRONICITY PATTERN: Primary | ICD-10-CM

## 2018-12-27 RX ORDER — AMITRIPTYLINE HYDROCHLORIDE 25 MG/1
TABLET, FILM COATED ORAL
Qty: 180 TABLET | Refills: 3 | Status: SHIPPED | OUTPATIENT
Start: 2018-12-27 | End: 2020-01-24

## 2019-01-22 ENCOUNTER — TELEPHONE (OUTPATIENT)
Dept: NEUROLOGY | Facility: CLINIC | Age: 54
End: 2019-01-22

## 2019-01-22 DIAGNOSIS — G44.011 INTRACTABLE EPISODIC CLUSTER HEADACHE: Primary | ICD-10-CM

## 2019-01-22 RX ORDER — METHYLPREDNISOLONE 4 MG/1
TABLET ORAL
Qty: 1 EACH | Refills: 1 | Status: SHIPPED | OUTPATIENT
Start: 2019-01-22 | End: 2019-01-28 | Stop reason: ALTCHOICE

## 2019-01-22 NOTE — TELEPHONE ENCOUNTER
Called patient, reviewed all her medications, advised to discontinue gabapentin, and will give her a Medrol Dosepak for the acute clusters that she has been experiencing

## 2019-01-22 NOTE — TELEPHONE ENCOUNTER
Patient c/o cluster headaches on and off, started back up again during the second week in Jan  Reports that the last 5 days are "out of control"  Pain level currently 0, but when she has them, the pain is  "like 20"  Has been taking all prescribed meds  She stated the decadron does help some times      Requesting a call back from Dr Marlon Ryan    [phone]

## 2019-01-28 ENCOUNTER — OFFICE VISIT (OUTPATIENT)
Dept: INTERNAL MEDICINE CLINIC | Facility: CLINIC | Age: 54
End: 2019-01-28
Payer: COMMERCIAL

## 2019-01-28 VITALS
TEMPERATURE: 98 F | HEART RATE: 102 BPM | DIASTOLIC BLOOD PRESSURE: 80 MMHG | OXYGEN SATURATION: 98 % | HEIGHT: 71 IN | RESPIRATION RATE: 18 BRPM | BODY MASS INDEX: 35.42 KG/M2 | SYSTOLIC BLOOD PRESSURE: 122 MMHG | WEIGHT: 253 LBS

## 2019-01-28 DIAGNOSIS — J01.00 ACUTE NON-RECURRENT MAXILLARY SINUSITIS: Primary | ICD-10-CM

## 2019-01-28 DIAGNOSIS — G44.021 INTRACTABLE CHRONIC CLUSTER HEADACHE: ICD-10-CM

## 2019-01-28 PROBLEM — J20.9 ACUTE BRONCHITIS WITH BRONCHOSPASM: Status: RESOLVED | Noted: 2018-04-02 | Resolved: 2019-01-28

## 2019-01-28 PROBLEM — R05.9 COUGH: Status: RESOLVED | Noted: 2018-04-02 | Resolved: 2019-01-28

## 2019-01-28 PROCEDURE — 3008F BODY MASS INDEX DOCD: CPT | Performed by: INTERNAL MEDICINE

## 2019-01-28 PROCEDURE — 99213 OFFICE O/P EST LOW 20 MIN: CPT | Performed by: INTERNAL MEDICINE

## 2019-01-28 RX ORDER — CEFUROXIME AXETIL 250 MG/1
250 TABLET ORAL EVERY 12 HOURS SCHEDULED
Qty: 20 TABLET | Refills: 0 | Status: SHIPPED | OUTPATIENT
Start: 2019-01-28 | End: 2019-02-07

## 2019-01-28 RX ORDER — FLUTICASONE PROPIONATE 50 MCG
1 SPRAY, SUSPENSION (ML) NASAL DAILY
Qty: 16 G | Refills: 1 | Status: SHIPPED | OUTPATIENT
Start: 2019-01-28 | End: 2019-03-05

## 2019-01-28 NOTE — PROGRESS NOTES
Assessment/Plan:     Will treat her sinuses  Hopefully this is actually make her headaches worse so treating this would help because her treatments for the cluster headaches are not helping this time  BMI Counseling: Body mass index is 35 29 kg/m²  Discussed the patient's BMI with her  The BMI is above average  BMI counseling and education was provided to the patient  Exercise recommendations include exercising 3-5 times per week  Return if symptoms worsen or fail to improve  No problem-specific Assessment & Plan notes found for this encounter  Diagnoses and all orders for this visit:    Acute non-recurrent maxillary sinusitis  -     fluticasone (FLONASE) 50 mcg/act nasal spray; 1 spray into each nostril daily  -     cefuroxime (CEFTIN) 250 mg tablet; Take 1 tablet (250 mg total) by mouth every 12 (twelve) hours for 10 days    Intractable chronic cluster headache          Subjective:      Patient ID: Jono Espino is a 52 y o  female  Patient comes in today complaining of at least a week, probably 2 of increasing trouble with her cluster headaches  She is just finishing a course of steroids from her neurologist but the headaches will not go away  She is getting more frontal headaches and in her sinuses now so she thinks that may be the reason  Her ears are starting to bother her  No fevers  Over-the-counter remedies not working  Her migraine medicine is not working  ALLERGIES:  No Known Allergies    CURRENT MEDICATIONS:    Current Outpatient Prescriptions:     amitriptyline (ELAVIL) 25 mg tablet, TAKE 2 TABLETS AT BEDTIME, Disp: 180 tablet, Rfl: 3    eletriptan (RELPAX) 40 MG tablet, TAKE 1 TABLET AT ONSET OF MIGRAINE, MAY REPEAT ONCE AFTER 2 HOURS   MAX 2 DOSES PER 24 HOURS, Disp: 36 tablet, Rfl: 0    HYDROcodone-acetaminophen (NORCO) 5-325 mg per tablet, Take 1 tablet by mouth every 6 (six) hours as needed  , Disp: , Rfl:     ibuprofen (MOTRIN) 600 mg tablet, TAKE 1 TABLET 3 TIMES DAILY WITH MEALS , Disp: , Rfl: 1    topiramate (TOPAMAX) 25 mg tablet, Take 4 tablets (100 mg total) by mouth daily at bedtime, Disp: 360 tablet, Rfl: 3    verapamil (VERELAN) 240 MG 24 hr capsule, Take 1 capsule (240 mg total) by mouth daily, Disp: 90 capsule, Rfl: 3    cefuroxime (CEFTIN) 250 mg tablet, Take 1 tablet (250 mg total) by mouth every 12 (twelve) hours for 10 days, Disp: 20 tablet, Rfl: 0    fluticasone (FLONASE) 50 mcg/act nasal spray, 1 spray into each nostril daily, Disp: 16 g, Rfl: 1    ACTIVE PROBLEM LIST:  Patient Active Problem List   Diagnosis    Thrombophlebitis of superficial veins of right lower extremity    Chronic venous insufficiency    Lymphedema    Back pain, lumbosacral    Dense breasts    Varicose veins of legs    Cluster headaches       PAST MEDICAL HISTORY:  Past Medical History:   Diagnosis Date    Asymptomatic postmenopausal status     Back pain, lumbosacral     Cellulitis of left lower extremity     last assessed    3/30/16    resolved  Margarette Blend   9/8/16      Cluster headaches     Colon polyps     Dense breasts     Foot pain, left     Hip pain, right     Lumbosacral pain     Lymph edema     Right hip pain     Spider veins     Tendinitis of right shoulder     Varicose veins of legs        PAST SURGICAL HISTORY:  Past Surgical History:   Procedure Laterality Date    BACK SURGERY      AK REVISE MEDIAN N/CARPAL TUNNEL SURG Right 9/12/2016    Procedure: WRIST OPEN CARPAL TUNNEL RELEASE ;  Surgeon: John Fitzgerald MD;  [facility];  Service: Orthopedics    TUBAL LIGATION      VEIN LIGATION AND STRIPPING Left        FAMILY HISTORY:  Family History   Problem Relation Age of Onset    Varicose Veins Father     Heart disease Father         cardiac disorder    Breast cancer Sister         neoplasm    Other Maternal Grandmother         gyn problem    Cervical cancer Maternal Grandmother     Breast cancer Maternal Aunt         neoplasm    Migraines Daughter     Cancer Sister         Appendix    Colon cancer Neg Hx     Ovarian cancer Neg Hx     Uterine cancer Neg Hx        SOCIAL HISTORY:  Social History     Social History    Marital status: /Civil Union     Spouse name: N/A    Number of children: 2    Years of education: N/A     Occupational History          full time - post office     Social History Main Topics    Smoking status: Current Every Day Smoker     Packs/day: 1 00    Smokeless tobacco: Never Used      Comment: 1 ppd x 40 yrs    Alcohol use Yes      Comment: on occasion / social     Drug use: No    Sexual activity: Yes     Birth control/ protection: Surgical     Other Topics Concern    Not on file     Social History Narrative    Brushes teeth twice a day    Dental care regularly       Review of Systems   Constitutional: Negative for fever  HENT: Positive for sinus pain  Respiratory: Negative for shortness of breath  Cardiovascular: Negative for chest pain  Gastrointestinal: Negative for abdominal pain  Neurological: Positive for headaches  Objective:  Vitals:    01/28/19 1451   BP: 122/80   Pulse: 102   Resp: 18   Temp: 98 °F (36 7 °C)   SpO2: 98%   Weight: 115 kg (253 lb)   Height: 5' 11" (1 803 m)     Body mass index is 35 29 kg/m²  Physical Exam   Constitutional: She is oriented to person, place, and time  She appears well-developed and well-nourished  HENT:   Right Ear: External ear normal    Left Ear: External ear normal    Nose: Right sinus exhibits maxillary sinus tenderness and frontal sinus tenderness  Left sinus exhibits maxillary sinus tenderness and frontal sinus tenderness  Mouth/Throat: No oropharyngeal exudate  Eyes: Conjunctivae are normal    Cardiovascular: Normal rate and regular rhythm  Pulmonary/Chest: Effort normal and breath sounds normal  She has no wheezes  She has no rales  Lymphadenopathy:     She has no cervical adenopathy     Neurological: She is alert and oriented to person, place, and time  Skin: No rash noted  RESULTS:    No results found for this or any previous visit (from the past 1008 hour(s))  This note was created with voice recognition software  Phonic, grammatical and spelling errors may be present within the note as a result

## 2019-02-06 ENCOUNTER — TELEPHONE (OUTPATIENT)
Dept: OBGYN CLINIC | Facility: CLINIC | Age: 54
End: 2019-02-06

## 2019-02-06 DIAGNOSIS — G43.009 MIGRAINE WITHOUT AURA AND WITHOUT STATUS MIGRAINOSUS, NOT INTRACTABLE: ICD-10-CM

## 2019-02-06 DIAGNOSIS — N63.20 LEFT BREAST MASS: Primary | ICD-10-CM

## 2019-02-06 DIAGNOSIS — Z80.3 FAMILY HISTORY OF MALIGNANT NEOPLASM OF BREAST: ICD-10-CM

## 2019-02-06 RX ORDER — ELETRIPTAN HYDROBROMIDE 40 MG/1
TABLET, FILM COATED ORAL
Qty: 36 TABLET | Refills: 0 | Status: SHIPPED | OUTPATIENT
Start: 2019-02-06 | End: 2019-02-08 | Stop reason: SDUPTHER

## 2019-02-06 NOTE — TELEPHONE ENCOUNTER
Called pt's insurance [employer] no auth needed for breast biopsy cpt code 00090 for N63 0, per [address], case id [id number]   Will fax order and last ovs note

## 2019-02-06 NOTE — TELEPHONE ENCOUNTER
[facility] is asking for an us guided lft breast biopsy order   Is this ok to order, based on her arti reports,please advise

## 2019-02-08 ENCOUNTER — TELEPHONE (OUTPATIENT)
Dept: NEUROLOGY | Facility: CLINIC | Age: 54
End: 2019-02-08

## 2019-02-08 DIAGNOSIS — G43.009 MIGRAINE WITHOUT AURA AND WITHOUT STATUS MIGRAINOSUS, NOT INTRACTABLE: ICD-10-CM

## 2019-02-08 DIAGNOSIS — G44.021 INTRACTABLE CHRONIC CLUSTER HEADACHE: ICD-10-CM

## 2019-02-08 RX ORDER — TOPIRAMATE 25 MG/1
125 TABLET ORAL
Qty: 360 TABLET | Refills: 3
Start: 2019-02-08 | End: 2019-02-28

## 2019-02-08 RX ORDER — ELETRIPTAN HYDROBROMIDE 40 MG/1
TABLET, FILM COATED ORAL
Qty: 36 TABLET | Refills: 0 | Status: SHIPPED | OUTPATIENT
Start: 2019-02-08 | End: 2019-06-25 | Stop reason: SDUPTHER

## 2019-02-08 NOTE — TELEPHONE ENCOUNTER
Called patient, advised to increase topiramate to 125 mg at bedtime  She will call us back in the next 3 4 days if she sees no relief

## 2019-02-08 NOTE — TELEPHONE ENCOUNTER
pt called and states that she completed steroids see encounter from 1/22, and they did not help   she had 2 cluster headaches yesterday which is not normal   they are not as severe  but hse is also getting them troughout the night  she was also treated for a sinus infection right after she completed the steroids  not currently having a headache  She is asking for you to [facility] as brand relpax was $250 for 12 tab with coupon      [phone]

## 2019-02-15 ENCOUNTER — TELEPHONE (OUTPATIENT)
Dept: NEUROLOGY | Facility: CLINIC | Age: 54
End: 2019-02-15

## 2019-02-15 NOTE — TELEPHONE ENCOUNTER
Ana Recinos w/[facility] called and states that relpax brand name was sent  Pt has been on generic   Relpax brand name 40 mg copay is $ >1000  Generic eletriptan 40 mg copay is $20   Timmy Castro to give verbal for the generic eletriptan?        [phone]  Ref #[id number]

## 2019-02-18 ENCOUNTER — TELEPHONE (OUTPATIENT)
Dept: NEUROLOGY | Facility: CLINIC | Age: 54
End: 2019-02-18

## 2019-02-18 DIAGNOSIS — G44.021 INTRACTABLE CHRONIC CLUSTER HEADACHE: ICD-10-CM

## 2019-02-18 RX ORDER — VERAPAMIL HYDROCHLORIDE 120 MG/1
240 CAPSULE, EXTENDED RELEASE ORAL DAILY
Qty: 180 CAPSULE | Refills: 0 | Status: SHIPPED | OUTPATIENT
Start: 2019-02-18 | End: 2019-02-19 | Stop reason: SDUPTHER

## 2019-02-18 NOTE — TELEPHONE ENCOUNTER
Patient called in stating she would like to speak with Dr Katie Carter regarding her headaches as they are still bothering her  States she has been getting them three times per night  Not lasting as long as previously but still present  When did migraine start? No, reports she gets them three times per night and sometimes in the morning  Location/Description: sharp pain, starts in teeth then on the right side of her head  Pain scale: patient states her headaches are always a 10 when she gets them  Associated symptoms:sonophobia, photophobia  Precipitating factors: no particular thing  Alleviating factors: Relpax  What medications have you tried for this migraine headache? Prescription meds as prescribed    Current migraine medications are confirmed as:  Verapamil 240mg daily  Topamax 25mg tablets 5 tabs at bedtime  Relpax 40mg prn-takes one each night at bedtime per patient  Elavil 25mg tablets, 2 tablets at bedtime    Medications tried in the past?  pt has tried decadron (steroid)- didn't help, depakote- has tried, hasn't helped, or olanzapine- doesn't believe she has tried that    Patient also states [facility] is unable to fill her Verapamil 240 24h capsule prescription as med is backordered  She states she has been taking old prescription for Verapamil 120mg 24h cap (2 tabs) but needs refills (has one week left)  Call placed to [address] (per patient request) who states they only have the Verapamil 240mg tablets which has a different release mechanism  [facility] and I spoke with Rosanna Foster who states the Verapamil 120mg 24h capsules are in stock and we could sent that as an alternate  Call placed to patient who is agreeable to the Verapamil 120mg capsules (2 tabs) and requests 3 month supply as she is going out of town March 8th  Orders entered, please approve if appropriate

## 2019-02-19 DIAGNOSIS — G44.021 INTRACTABLE CHRONIC CLUSTER HEADACHE: ICD-10-CM

## 2019-02-19 RX ORDER — VERAPAMIL HYDROCHLORIDE 120 MG/1
120 CAPSULE, EXTENDED RELEASE ORAL 2 TIMES DAILY
Qty: 270 CAPSULE | Refills: 1 | Status: SHIPPED | OUTPATIENT
Start: 2019-02-19 | End: 2019-12-04 | Stop reason: SDUPTHER

## 2019-02-19 NOTE — TELEPHONE ENCOUNTER
Called patient, advised to increase verapamil to 120 mg in a m  and 240 mg at bedtime    [facility]

## 2019-02-19 NOTE — TELEPHONE ENCOUNTER
Dr Jose Manuel Stewart did you speak with this patient? She wanted to talk with you about her current headaches   Please advise

## 2019-02-28 RX ORDER — DIVALPROEX SODIUM 500 MG/1
500 TABLET, EXTENDED RELEASE ORAL DAILY
Qty: 30 TABLET | Refills: 1 | Status: SHIPPED | OUTPATIENT
Start: 2019-02-28 | End: 2019-03-07 | Stop reason: ALTCHOICE

## 2019-02-28 NOTE — TELEPHONE ENCOUNTER
Called patient, saw no relief by increasing verapamil few days ago is advised to go back previous dose of verapamil 240 mg at bedtime, patient also advised to taper herself off the topiramate over the next 2-3 days  Will start Depakote extended release 500 mg daily, and advised to continue Relpax prn and Elavil 50 mg at bedtime

## 2019-02-28 NOTE — TELEPHONE ENCOUNTER
Pt calls in states that she still has migraine and would like you to give her a call as soon as you are able      [phone]

## 2019-03-05 ENCOUNTER — ANNUAL EXAM (OUTPATIENT)
Dept: OBGYN CLINIC | Age: 54
End: 2019-03-05
Payer: COMMERCIAL

## 2019-03-05 VITALS
DIASTOLIC BLOOD PRESSURE: 78 MMHG | HEIGHT: 71 IN | BODY MASS INDEX: 35.7 KG/M2 | WEIGHT: 255 LBS | SYSTOLIC BLOOD PRESSURE: 120 MMHG

## 2019-03-05 DIAGNOSIS — Z78.0 ASYMPTOMATIC POSTMENOPAUSAL STATUS: ICD-10-CM

## 2019-03-05 DIAGNOSIS — Z12.31 ENCOUNTER FOR SCREENING MAMMOGRAM FOR MALIGNANT NEOPLASM OF BREAST: ICD-10-CM

## 2019-03-05 DIAGNOSIS — Z01.419 ENCOUNTER FOR GYNECOLOGICAL EXAMINATION WITHOUT ABNORMAL FINDING: Primary | ICD-10-CM

## 2019-03-05 PROCEDURE — 99396 PREV VISIT EST AGE 40-64: CPT | Performed by: NURSE PRACTITIONER

## 2019-03-05 PROCEDURE — 87624 HPV HI-RISK TYP POOLED RSLT: CPT | Performed by: NURSE PRACTITIONER

## 2019-03-05 PROCEDURE — G0145 SCR C/V CYTO,THINLAYER,RESCR: HCPCS | Performed by: NURSE PRACTITIONER

## 2019-03-05 RX ORDER — APIXABAN 5 MG/1
TABLET, FILM COATED ORAL
Refills: 0 | COMMUNITY
Start: 2019-02-22 | End: 2019-03-27 | Stop reason: ALTCHOICE

## 2019-03-05 NOTE — PATIENT INSTRUCTIONS
Venous Thromboembolism   WHAT YOU SHOULD KNOW:   A venous thromboembolism (VTE) is when a blood clot (thrombus) has formed in a vein  A VTE can form anywhere in your body and block blood flow  A VTE in the deep veins in the legs, thighs, pelvis, or arms is called a deep venous thrombosis (DVT)  If a blood clot breaks loose, it can travel to your lungs and cause a life-threatening pulmonary embolism (PE)  You may need treatment to prevent or to treat a VTE  INSTRUCTIONS:   Medicines: You may need the following:  · Anticoagulants  are a type of blood thinner medicine that helps prevent a VTE  These medicines may cause you to bleed or bruise more easily  ¨ Watch for bleeding from your gums or nose  Watch for blood in your urine and bowel movements  Use a soft washcloth and a soft toothbrush  If you shave, use an electric razor  Avoid activities that can cause bruising or bleeding  ¨ Tell your healthcare provider about all medicines you take because many medicines cannot be used with anticoagulants  Do not start or stop any medicines unless your healthcare provider tells you to  Tell your dentist and other healthcare providers that you take anticoagulants  Wear a bracelet or necklace that says you take this medicine  ¨ You will need regular blood tests so your healthcare provider can decide how much medicine you need  Take anticoagulants exactly as directed  Tell your healthcare provider right away if you forget to take the medicine, or if you take too much  ¨ If you take warfarin, some foods can change how your blood clots  Do not make major changes to your diet while you take warfarin  Warfarin works best when you eat about the same amount of vitamin K every day  Vitamin K is found in green leafy vegetables, broccoli, grapes, and other foods  Ask for more information about what to eat when you take warfarin  · Take your medicine as directed    Call your healthcare provider if you think your medicine is not helping or if you have side effects  Tell him if you are allergic to any medicine  Keep a list of the medicines, vitamins, and herbs you take  Include the amounts, and when and why you take them  Bring the list or the pill bottles to follow-up visits  Carry your medicine list with you in case of an emergency  Follow up with your healthcare provider as directed: You will need to return for more tests  You may also need blood tests to measure how long it takes for your blood to clot  Write down your questions so you remember to ask them during your visits  Compression stockings or sleeves:  Compression devices put pressure on your arms or legs to increase blood flow and decrease swelling  Your healthcare provider will measure your arm or leg to make sure you have the right size  Wear them as directed  Remove your stockings daily to clean your skin and look for sores, blisters, or color changes  Ask for more information about how to care for and use compression stockings  Prevent a VTE:   · Manage other health conditions  such as high blood pressure, diabetes, or high cholesterol  · Maintain a healthy weight  Ask your healthcare provider how much you should weigh  Ask him to help you create a weight loss plan if you are overweight  · Stay active  Ask your healthcare provider about the best exercise plan for you  When you travel by car or plane, take breaks to stand up and move around as much as possible  Rotate your feet in circles often if you sit for a long period of time  This will help increase your blood flow  · Do not smoke  If you smoke, it is never too late to quit  Smoking increases your risk for a VTE  Ask for information if you need help quitting  Contact your healthcare provider if:   · You have more bruises than usual      · You have bloody noses or bleeding gums  · You have blood in your urine  · Your bowel movements are dark or have blood in them      · You have a sore or skin changes under your compression stocking or sleeve  · You have a sore on your leg that does not heal     · You have questions or concerns about your condition or care  Return to the emergency department if:   · Your arm or leg feels warm, tender, and painful  It may look swollen and red  · You have sudden abdominal pain, vomiting, and diarrhea  · You have a severe headache or a seizure  · You feel lightheaded, short of breath, and have chest pain  · You cough up blood  © 2014 [address] is for End User's use only and may not be sold, redistributed or otherwise used for commercial purposes  All illustrations and images included in CareNotes® are the copyrighted property of [employer] , [employer]  or Ed Sloan  The above information is an  only  It is not intended as medical advice for individual conditions or treatments  Talk to your doctor, nurse or pharmacist before following any medical regimen to see if it is safe and effective for you

## 2019-03-05 NOTE — PROGRESS NOTES
Assessment/Plan:    No problem-specific Assessment & Plan notes found for this encounter  Diagnoses and all orders for this visit:    Encounter for gynecological examination without abnormal finding    Asymptomatic postmenopausal status    Other orders  -     ELIQUIS 5 MG; TAKE 2 TABLETS BY MOUTH TWICE A DAY FOR 1 WEEK, THEN TAKE 1 TABLET TWICE A DAY        Call as needed, encouraged calcium/vit D supplementation, all questions answered    Subjective:      Patient ID: Randall Poon is a 52 y o  female  Pleasant 45 y o  postmenopausal female here for annual exam  She denies postmenopausal bleeding  Denies history of abnormal pap smears  Denies vaginal issues  Denies pelvic pain  Denies menopausal/postmenopausal issues  + smoker, PREFERS [facility] PAPS  Having a left breast intraductal papillary lesion removed in a few wks  Sister with breast cancer at 38 yo  Gynecologic Exam   Pertinent negatives include no chills, constipation, diarrhea, dysuria, fever or hematuria  The following portions of the patient's history were reviewed and updated as appropriate:   She  has a past medical history of Asymptomatic postmenopausal status, Back pain, lumbosacral, Cellulitis of left lower extremity, Cluster headaches, Colon polyps, Dense breasts, Foot pain, left, Hip pain, right, Lumbosacral pain, Lymph edema, Right hip pain, Spider veins, Tendinitis of right shoulder, and Varicose veins of legs  She does not have any pertinent problems on file  She  has a past surgical history that includes Back surgery; Vein ligation and stripping (Left); pr revise median n/carpal tunnel surg (Right, 9/12/2016); Tubal ligation; and Breast biopsy (Left)  Her family history includes Breast cancer in her maternal aunt and sister; Cancer in her sister; Cervical cancer in her maternal grandmother; Heart disease in her father; Migraines in her daughter; Other in her maternal grandmother; Varicose Veins in her father    She  reports that she has been smoking  She has been smoking about 1 00 pack per day  She has never used smokeless tobacco  She reports that she drinks alcohol  She reports that she does not use drugs  Current Outpatient Medications   Medication Sig Dispense Refill    amitriptyline (ELAVIL) 25 mg tablet TAKE 2 TABLETS AT BEDTIME 180 tablet 3    divalproex sodium (DEPAKOTE ER) 500 mg 24 hr tablet Take 1 tablet (500 mg total) by mouth daily 30 tablet 1    eletriptan (RELPAX) 40 MG tablet Take 1 tablet at onset of migraine, May repeat once after 2 hours  Max 2 doses per 24 hours  BRAND NAME 36 tablet 0    ELIQUIS 5 MG TAKE 2 TABLETS BY MOUTH TWICE A DAY FOR 1 WEEK, THEN TAKE 1 TABLET TWICE A DAY  0    HYDROcodone-acetaminophen (NORCO) 5-325 mg per tablet Take 1 tablet by mouth every 6 (six) hours as needed        ibuprofen (MOTRIN) 600 mg tablet TAKE 1 TABLET 3 TIMES DAILY WITH MEALS  1    verapamil (VERELAN PM) 120 MG 24 hr capsule Take 1 capsule (120 mg total) by mouth 2 (two) times a day 1 capsule by mouth in a m  And 2 capsules by mouth at bedtime 270 capsule 1     No current facility-administered medications for this visit  Current Outpatient Medications on File Prior to Visit   Medication Sig    amitriptyline (ELAVIL) 25 mg tablet TAKE 2 TABLETS AT BEDTIME    divalproex sodium (DEPAKOTE ER) 500 mg 24 hr tablet Take 1 tablet (500 mg total) by mouth daily    eletriptan (RELPAX) 40 MG tablet Take 1 tablet at onset of migraine, May repeat once after 2 hours  Max 2 doses per 24 hours  BRAND NAME    ELIQUIS 5 MG TAKE 2 TABLETS BY MOUTH TWICE A DAY FOR 1 WEEK, THEN TAKE 1 TABLET TWICE A DAY    HYDROcodone-acetaminophen (NORCO) 5-325 mg per tablet Take 1 tablet by mouth every 6 (six) hours as needed      ibuprofen (MOTRIN) 600 mg tablet TAKE 1 TABLET 3 TIMES DAILY WITH MEALS   verapamil (VERELAN PM) 120 MG 24 hr capsule Take 1 capsule (120 mg total) by mouth 2 (two) times a day 1 capsule by mouth in a m   And 2 capsules by mouth at bedtime    [DISCONTINUED] fluticasone (FLONASE) 50 mcg/act nasal spray 1 spray into each nostril daily     No current facility-administered medications on file prior to visit  She has No Known Allergies       Review of Systems   Constitutional: Negative for chills, fatigue, fever and unexpected weight change  Respiratory: Negative for shortness of breath  Gastrointestinal: Negative for anal bleeding, blood in stool, constipation and diarrhea  Genitourinary: Negative for difficulty urinating, dysuria and hematuria  Objective:      /78 (BP Location: Right arm, Patient Position: Sitting)   Ht 5' 11" (1 803 m)   Wt 116 kg (255 lb)   LMP 01/01/2015   Breastfeeding? No   BMI 35 57 kg/m²          Physical Exam   Constitutional: She appears well-developed and well-nourished  No distress  HENT:   Head: Normocephalic  Neck: Normal range of motion  Neck supple  Pulmonary/Chest: Effort normal  Right breast exhibits no inverted nipple, no mass, no nipple discharge, no skin change and no tenderness  Left breast exhibits no inverted nipple, no mass, no nipple discharge, no skin change and no tenderness  No breast swelling, tenderness, discharge or bleeding  Breasts are symmetrical    Abdominal: Soft  Genitourinary: Pelvic exam was performed with patient supine  No labial fusion  There is no rash, tenderness, lesion or injury on the right labia  There is no rash, tenderness, lesion or injury on the left labia  Uterus is not deviated, not enlarged, not fixed and not tender  Cervix exhibits no motion tenderness, no discharge and no friability  Right adnexum displays no mass, no tenderness and no fullness  Left adnexum displays no mass, no tenderness and no fullness  No erythema or tenderness in the vagina  No foreign body in the vagina  No signs of injury around the vagina  No vaginal discharge found  Lymphadenopathy:        Right: No inguinal adenopathy present  Left: No inguinal adenopathy present  BILATERAL LYMPHEDEMA, RECENT SUPERFICIAL VEIN THROMBOSIS LEFT LEG

## 2019-03-06 LAB
HPV HR 12 DNA CVX QL NAA+PROBE: NEGATIVE
HPV16 DNA CVX QL NAA+PROBE: NEGATIVE
HPV18 DNA CVX QL NAA+PROBE: NEGATIVE

## 2019-03-07 ENCOUNTER — TELEPHONE (OUTPATIENT)
Dept: NEUROLOGY | Facility: CLINIC | Age: 54
End: 2019-03-07

## 2019-03-07 DIAGNOSIS — G50.1 ATYPICAL FACIAL PAIN: Primary | ICD-10-CM

## 2019-03-07 RX ORDER — OXCARBAZEPINE 300 MG/1
300 TABLET, FILM COATED ORAL EVERY 12 HOURS SCHEDULED
Qty: 60 TABLET | Refills: 0 | Status: SHIPPED | OUTPATIENT
Start: 2019-03-07 | End: 2019-03-18 | Stop reason: SDUPTHER

## 2019-03-07 NOTE — TELEPHONE ENCOUNTER
Called patient, advised to discontinue Depakote and will give her a trial of Trileptal 300 mg twice a day for possible atypical facial pain

## 2019-03-07 NOTE — TELEPHONE ENCOUNTER
Pt called w/ an update  States that depakote did not help, she's been taking it for a week now  Still c/o cluster headaches at least 4/night  Sensitive to light  Extremely sharp pain, starts in teeth then on right nostril then on the right side of her head  It was 6/10 this morning  Denies headache at this time     Pls see encounter 2/18/19                   [phone]

## 2019-03-08 LAB
LAB AP GYN PRIMARY INTERPRETATION: NORMAL
Lab: NORMAL

## 2019-03-18 DIAGNOSIS — G50.1 ATYPICAL FACIAL PAIN: ICD-10-CM

## 2019-03-18 RX ORDER — OXCARBAZEPINE 300 MG/1
450 TABLET, FILM COATED ORAL EVERY 12 HOURS SCHEDULED
Qty: 60 TABLET | Refills: 3 | Status: SHIPPED | OUTPATIENT
Start: 2019-03-18 | End: 2019-03-27 | Stop reason: ALTCHOICE

## 2019-03-18 NOTE — TELEPHONE ENCOUNTER
Pt called stated that she has been taking her trileptal 300mg BID since 3/7, reports that her cluster headaches have improved slightly, they went from a pain level of 15 to a 6, but they are still present, questioning if she should further increase, or what your recommendations are?

## 2019-03-26 NOTE — TELEPHONE ENCOUNTER
Pt calls to state that she is still having the cluster headache  Increasing the oxcarbazepine 450mg BID did not help with the headaches  Pt is stating that she is still sensitive to light and having pain that starts in her teeth goes around the right side of her nose and right side of her head  Please advise

## 2019-03-26 NOTE — TELEPHONE ENCOUNTER
Called patient, Patient's pain is getting more complex will need to re-evaluate her, please bring her in to see me in follow-up within a week

## 2019-03-27 ENCOUNTER — OFFICE VISIT (OUTPATIENT)
Dept: NEUROLOGY | Facility: CLINIC | Age: 54
End: 2019-03-27
Payer: COMMERCIAL

## 2019-03-27 VITALS
HEART RATE: 78 BPM | SYSTOLIC BLOOD PRESSURE: 128 MMHG | BODY MASS INDEX: 35.98 KG/M2 | WEIGHT: 257 LBS | DIASTOLIC BLOOD PRESSURE: 80 MMHG | HEIGHT: 71 IN

## 2019-03-27 DIAGNOSIS — G44.021 INTRACTABLE CHRONIC CLUSTER HEADACHE: Primary | ICD-10-CM

## 2019-03-27 PROCEDURE — 99214 OFFICE O/P EST MOD 30 MIN: CPT | Performed by: PSYCHIATRY & NEUROLOGY

## 2019-03-27 RX ORDER — DIVALPROEX SODIUM 500 MG/1
500 TABLET, EXTENDED RELEASE ORAL DAILY
Qty: 30 TABLET | Refills: 1 | Status: SHIPPED | OUTPATIENT
Start: 2019-03-27 | End: 2019-11-14 | Stop reason: SDUPTHER

## 2019-03-27 NOTE — PROGRESS NOTES
Progress Note - Neurology   Carl Ruiz 52 y o  female MRN: [id number]  Unit/Bed#:  Encounter: [id number]      Subjective:   Patient is here on an emergency basis she was called in by myself since she continues to have persistent headaches and in the recent past based on a telephone conversations the nature of the headache was felt to be somewhat of an atypical facial pain which she described as starting in the tooth and radiating upwards on the face into the right temple  Patient also had a tooth extraction done with no relief of symptoms  She was started on Trileptal which initially helped to dull the intensity of the headache but continues to experience recurrent headaches on a daily basis  The headaches mostly start in the evening and wake her up from sleep at least 2 to 3 times a day unless she takes a Relpax prior to going to bed  She does describe nasal congestion as well as lacrimation of the right eye associated with the right frontal headache at times with an explosive quality, lasts for up to an hour, and resolves  She denies any visual dysfunction, but does feel as if her right frontal sinus is always congested  She denies any speech difficulty or any motor or sensory symptoms in the upper lower extremities and at baseline has been having difficulty ambulating due to bilateral hip pain and DJD     ROS:   Review of Systems   Constitutional: Positive for fatigue  Negative for appetite change and fever  HENT: Positive for sinus pressure  Negative for ear pain, hearing loss, postnasal drip, rhinorrhea, sinus pain, tinnitus, trouble swallowing and voice change  Eyes: Positive for photophobia  Negative for pain and visual disturbance  Respiratory: Negative  Negative for shortness of breath  Cardiovascular: Negative  Negative for palpitations  Gastrointestinal: Negative  Negative for nausea and vomiting  Endocrine: Negative  Negative for cold intolerance and heat intolerance  Genitourinary: Negative  Negative for dysuria, frequency and urgency  Musculoskeletal: Positive for back pain  Negative for myalgias and neck pain  Hip pain   Skin: Negative  Negative for rash  Neurological: Positive for weakness (legs are weak) and headaches  Negative for dizziness, tremors, seizures, syncope, facial asymmetry, speech difficulty, light-headedness and numbness  Hematological: Negative  Does not bruise/bleed easily  Psychiatric/Behavioral: Positive for sleep disturbance (Headaches disturb her sleep)  Negative for confusion, decreased concentration and hallucinations  Vitals:   Vitals:    03/27/19 1131   BP: 128/80   BP Location: Left arm   Patient Position: Sitting   Cuff Size: Large   Pulse: 78   Weight: 117 kg (257 lb)   Height: 5' 11" (1 803 m)   ,Body mass index is 35 84 kg/m²  MEDS:      Current Outpatient Medications:     amitriptyline (ELAVIL) 25 mg tablet, TAKE 2 TABLETS AT BEDTIME, Disp: 180 tablet, Rfl: 3    eletriptan (RELPAX) 40 MG tablet, Take 1 tablet at onset of migraine, May repeat once after 2 hours  Max 2 doses per 24 hours  BRAND NAME, Disp: 36 tablet, Rfl: 0    HYDROcodone-acetaminophen (NORCO) 5-325 mg per tablet, Take 1 tablet by mouth every 6 (six) hours as needed  , Disp: , Rfl:     OXcarbazepine (TRILEPTAL) 300 mg tablet, Take 1 5 tablets (450 mg total) by mouth every 12 (twelve) hours, Disp: 60 tablet, Rfl: 3    verapamil (VERELAN PM) 120 MG 24 hr capsule, Take 1 capsule (120 mg total) by mouth 2 (two) times a day 1 capsule by mouth in a m  And 2 capsules by mouth at bedtime (Patient taking differently: Take 240 mg by mouth daily at bedtime ), Disp: 270 capsule, Rfl: 1  :    Physical Exam:  General appearance: alert, appears stated age and cooperative  Head: Normocephalic, without obvious abnormality, atraumatic    Patient is alert awake oriented, high functions are intact, speech is fluent  No evidence of any aphasia or dysarthria    Cranial nerve examination reveals visual fields are full to threat, pupils equal and reactive, extraocular movements intact, fundi showed sharp disc margins, sensation in the V1 V2 V3 distribution is symmetric, no obvious facial asymmetry noted,Hearing is preserved, tongue is midline and gag is adequate, shoulder shrug is symmetric bilaterally  Motor examination reveals normal tone and bulk, no evidence of any drift to the outstretched extremities, strength is 5/5 preserved bilaterally in both upper and lower extremities, deep tendon reflexes are intact, toes are downgoing  Sensory examination to pinprick light touch proprioception and vibration is preserved bilaterally, patient does not extinguish double simultaneous stimuli  Coordination no evidence of any finger-to-nose dysmetria  Gait is normal based Romberg sign is negative  Patient does have right maxillary as well as ethmoid and frontal sinus tenderness, there is no tenderness noted in the temple, and no cervical paraspinal  tenderness was noted  No bruits were appreciable in the neck  Lab Results: I have personally reviewed pertinent reports  Imaging Studies: I have personally reviewed pertinent reports  Assessment:  1  Intractable chronic cluster headaches  Plan:  Patient has a history of cluster headaches in the past generally resolved in a month or 2 during the winter months  In the recent past there has been a change in the quality of the headache which she describes as starting off as an atypical facial pain and radiating to the right frontal head region  Patient is advised an MRI of the brain which was done several years ago in the past and was normal due to the change in the quality of the headache  She is also advised a sed rate NIYAH and Lyme titer in the meanwhile will advised her to start Depakote extended release 500 mg daily, will taper herself off the Trileptal, will continue with Relpax on a p r n   Basis as well as verapamil 240 mg at bedtime  She remains on Elavil 25 mg at bedtime which we will also continue since it helps her sleep  Smoking cessation was again discussed since it does worsen cluster headaches and patient was explained the same  She will return back to see me in 2-3 months  Patient is advised to call me in the next 5 days if she sees no relief in spite of Depakote  3/27/2019,11:37 AM    Dictation voice to text software has been used in the creation of this document  Please consider this in light of any contextual or grammatical errors

## 2019-04-03 LAB
ANA TITR SER IF: NEGATIVE {TITER}
B BURGDOR IGG+IGM SER-ACNC: <0.91 ISR (ref 0–0.9)
B BURGDOR IGM SER IA-ACNC: <0.8 INDEX (ref 0–0.79)
ERYTHROCYTE [SEDIMENTATION RATE] IN BLOOD BY WESTERGREN METHOD: 20 MM/HR (ref 0–40)

## 2019-04-30 ENCOUNTER — TELEPHONE (OUTPATIENT)
Dept: NEUROLOGY | Facility: CLINIC | Age: 54
End: 2019-04-30

## 2019-06-25 DIAGNOSIS — G43.009 MIGRAINE WITHOUT AURA AND WITHOUT STATUS MIGRAINOSUS, NOT INTRACTABLE: ICD-10-CM

## 2019-06-25 DIAGNOSIS — G44.009 CLUSTER HEADACHE, NOT INTRACTABLE, UNSPECIFIED CHRONICITY PATTERN: ICD-10-CM

## 2019-06-25 RX ORDER — ELETRIPTAN HYDROBROMIDE 40 MG/1
TABLET, FILM COATED ORAL
Qty: 36 TABLET | Refills: 0 | Status: SHIPPED | OUTPATIENT
Start: 2019-06-25 | End: 2019-11-14 | Stop reason: SDUPTHER

## 2019-07-15 DIAGNOSIS — G43.009 MIGRAINE WITHOUT AURA AND WITHOUT STATUS MIGRAINOSUS, NOT INTRACTABLE: ICD-10-CM

## 2019-07-15 RX ORDER — ELETRIPTAN HYDROBROMIDE 40 MG/1
TABLET, FILM COATED ORAL
Qty: 36 TABLET | Refills: 0 | Status: SHIPPED | OUTPATIENT
Start: 2019-07-15 | End: 2019-11-14 | Stop reason: SDUPTHER

## 2019-08-15 ENCOUNTER — OFFICE VISIT (OUTPATIENT)
Dept: INTERNAL MEDICINE CLINIC | Facility: CLINIC | Age: 54
End: 2019-08-15
Payer: COMMERCIAL

## 2019-08-15 VITALS
RESPIRATION RATE: 16 BRPM | WEIGHT: 252 LBS | OXYGEN SATURATION: 96 % | BODY MASS INDEX: 35.28 KG/M2 | TEMPERATURE: 98.6 F | HEART RATE: 64 BPM | SYSTOLIC BLOOD PRESSURE: 118 MMHG | DIASTOLIC BLOOD PRESSURE: 64 MMHG | HEIGHT: 71 IN

## 2019-08-15 DIAGNOSIS — J01.40 ACUTE NON-RECURRENT PANSINUSITIS: Primary | ICD-10-CM

## 2019-08-15 DIAGNOSIS — J20.9 ACUTE BRONCHITIS WITH BRONCHOSPASM: ICD-10-CM

## 2019-08-15 PROCEDURE — 99213 OFFICE O/P EST LOW 20 MIN: CPT | Performed by: PHYSICIAN ASSISTANT

## 2019-08-15 PROCEDURE — 3008F BODY MASS INDEX DOCD: CPT | Performed by: PHYSICIAN ASSISTANT

## 2019-08-15 RX ORDER — CEFUROXIME AXETIL 250 MG/1
250 TABLET ORAL EVERY 12 HOURS SCHEDULED
Qty: 20 TABLET | Refills: 0 | Status: SHIPPED | OUTPATIENT
Start: 2019-08-15 | End: 2019-08-25

## 2019-08-15 NOTE — PROGRESS NOTES
Assessment/Plan:   Patient Instructions   Rest, increase fluids  Tylenol for fever or aches as per package instructions  Start finish antibiotic  Always wise to take a probiotic and or eat yogurt daily when on an antibiotic  Also add Flonase 1 spray each nostril twice a day as instructed and demonstrated  Remember it takes about 5 days for this to start working  Keep me informed if you're not steadily improving  BMI Counseling: Body mass index is 35 15 kg/m²  Discussed the patient's BMI with her  The BMI is above average  BMI counseling and education was provided to the patient  Nutrition recommendations include reducing portion sizes and decreasing overall calorie intake  Exercise recommendations include exercising 3-5 times per week  Return if symptoms worsen or fail to improve  Diagnoses and all orders for this visit:    Acute non-recurrent pansinusitis  -     cefuroxime (CEFTIN) 250 mg tablet; Take 1 tablet (250 mg total) by mouth every 12 (twelve) hours for 10 days    Acute bronchitis with bronchospasm  -     cefuroxime (CEFTIN) 250 mg tablet; Take 1 tablet (250 mg total) by mouth every 12 (twelve) hours for 10 days          Subjective:      Patient ID: Luis Napoles is a 52 y o  female  Acute visit    Patient with known history of intermittent chronic sinus infections  Over 10 days ago patient started with head congestion, postnasal drip, runny nose, and the onset of a tight cough  Cough is definitely worse at night when she is supine  She noted low-grade fever to 100  Complains of headache facial pressure  She has used OTC DayQuil and NyQuil with no improvement in her symptoms  She is feeling run down and not improving  Cough remains unproductive at this time          ALLERGIES:  No Known Allergies    CURRENT MEDICATIONS:    Current Outpatient Medications:     amitriptyline (ELAVIL) 25 mg tablet, TAKE 2 TABLETS AT BEDTIME, Disp: 180 tablet, Rfl: 3    divalproex sodium (DEPAKOTE ER) 500 mg 24 hr tablet, Take 1 tablet (500 mg total) by mouth daily, Disp: 30 tablet, Rfl: 1    eletriptan (RELPAX) 40 MG tablet, Take 1 tablet at onset of migraine, May repeat once after 2 hours  Max 2 doses per 24 hours  BRAND NAME, Disp: 36 tablet, Rfl: 0    eletriptan (RELPAX) 40 MG tablet, TAKE 1 TABLET AT ONSET OF MIGRAINE, MAY REPEAT ONCE AFTER 2 HOURS  MAXIMUM OF 2 DOSES PER 24 HOURS, Disp: 36 tablet, Rfl: 0    HYDROcodone-acetaminophen (NORCO) 5-325 mg per tablet, Take 1 tablet by mouth every 6 (six) hours as needed  , Disp: , Rfl:     verapamil (VERELAN PM) 120 MG 24 hr capsule, Take 1 capsule (120 mg total) by mouth 2 (two) times a day 1 capsule by mouth in a m  And 2 capsules by mouth at bedtime (Patient taking differently: Take 240 mg by mouth daily at bedtime ), Disp: 270 capsule, Rfl: 1    cefuroxime (CEFTIN) 250 mg tablet, Take 1 tablet (250 mg total) by mouth every 12 (twelve) hours for 10 days, Disp: 20 tablet, Rfl: 0    ACTIVE PROBLEM LIST:  Patient Active Problem List   Diagnosis    Thrombophlebitis of superficial veins of right lower extremity    Chronic venous insufficiency    Lymphedema    Back pain, lumbosacral    Dense breasts    Varicose veins of legs    Cluster headaches    Encounter for gynecological examination without abnormal finding    Asymptomatic postmenopausal status       PAST MEDICAL HISTORY:  Past Medical History:   Diagnosis Date    Asymptomatic postmenopausal status     Back pain, lumbosacral     Cellulitis of left lower extremity     last assessed    3/30/16    resolved  Osiris Baca   9/8/16      Cluster headaches     Colon polyps     Dense breasts     Foot pain, left     Head injury     Dec 2017 no LOC    Hip pain, right     Lumbosacral pain     Lymph edema     Right hip pain     Spider veins     Tendinitis of right shoulder     Varicose veins of legs        PAST SURGICAL HISTORY:  Past Surgical History:   Procedure Laterality Date    BACK SURGERY      type unknown- occurred during vaginal delivery    BREAST BIOPSY Left     WI REVISE MEDIAN N/CARPAL TUNNEL SURG Right 9/12/2016    Procedure: WRIST OPEN CARPAL TUNNEL RELEASE ;  Surgeon: Duy Hargrove MD;  [facility];  Service: Orthopedics    TUBAL LIGATION      VEIN LIGATION AND STRIPPING Left        FAMILY HISTORY:  Family History   Problem Relation Age of Onset   [employer] Sudden death Mother         MVA hit by a drunk     Varicose Veins Father     Heart disease Father         cardiac disorder    Breast cancer Sister         neoplasm    Other Maternal Grandmother         gyn problem    Cervical cancer Maternal Grandmother     Breast cancer Maternal Aunt         neoplasm    Migraines Daughter     Cancer Sister         Appendix    Colon cancer Neg Hx     Ovarian cancer Neg Hx     Uterine cancer Neg Hx        SOCIAL HISTORY:  Social History     Socioeconomic History    Marital status: /Civil Union     Spouse name: Not on file    Number of children: 2    Years of education: Not on file    Highest education level: Not on file   Occupational History     Comment: full time - [employer]   Social Needs    Financial resource strain: Not on file    Food insecurity:     Worry: Not on file     Inability: Not on file    Transportation needs:     Medical: Not on file     Non-medical: Not on file   Tobacco Use    Smoking status: Current Every Day Smoker     Packs/day: 1 00    Smokeless tobacco: Never Used   Substance and Sexual Activity    Alcohol use: Not Currently     Frequency: Never    Drug use: No    Sexual activity: Yes     Birth control/protection: Surgical, Female Sterilization   Lifestyle    Physical activity:     Days per week: Not on file     Minutes per session: Not on file    Stress: Not on file   Relationships    Social connections:     Talks on phone: Not on file     Gets together: Not on file     Attends Holiness service: Not on file     Active member of club or organization: Not on file     Attends meetings of clubs or organizations: Not on file     Relationship status: Not on file    Intimate partner violence:     Fear of current or ex partner: Not on file     Emotionally abused: Not on file     Physically abused: Not on file     Forced sexual activity: Not on file   Other Topics Concern    Not on file   Social History Narrative    Brushes teeth twice a day    Dental care regularly       Review of Systems   Constitutional: Positive for fatigue and fever  Negative for activity change and chills  HENT: Positive for congestion, postnasal drip, rhinorrhea, sinus pressure and sinus pain  Negative for ear pain, sneezing, sore throat and voice change  Eyes: Negative for discharge, redness, itching and visual disturbance  Respiratory: Positive for cough and chest tightness  Negative for shortness of breath and wheezing  Cardiovascular: Negative for chest pain, palpitations and leg swelling  Gastrointestinal: Negative for abdominal pain, constipation, diarrhea, nausea and vomiting  Genitourinary: Negative for difficulty urinating  Musculoskeletal: Negative for arthralgias and myalgias  Skin: Negative for rash  Allergic/Immunologic: Negative for immunocompromised state  Neurological: Negative for dizziness, syncope, weakness, light-headedness and headaches  Hematological: Negative for adenopathy  Does not bruise/bleed easily  Psychiatric/Behavioral: Negative for dysphoric mood, sleep disturbance and suicidal ideas  The patient is not nervous/anxious  Objective:  Vitals:    08/15/19 1256   BP: 118/64   BP Location: Left arm   Cuff Size: Large   Pulse: 64   Resp: 16   Temp: 98 6 °F (37 °C)   TempSrc: Oral   SpO2: 96%   Weight: 114 kg (252 lb)   Height: 5' 11" (1 803 m)     Body mass index is 35 15 kg/m²  Physical Exam   Constitutional: She is oriented to person, place, and time  She appears well-developed and well-nourished   No distress  Obese   HENT:   HEENT-injected conjunctivae, TMs dull with fluid behind, nasal mucosa hyperemic with yellow/green mucoid drainage, injected red posterior pharynx with yellow/green post nasal drainage, acutely tender bilateral maxillary and frontal sinus region  Neck: Carotid bruit is not present  Cardiovascular: Normal rate, regular rhythm and normal heart sounds  Pulmonary/Chest: Effort normal  No respiratory distress  She exhibits no tenderness  Coarse breath sounds in anterior fields  No current forced expiratory wheezing, however forced expiration does cause paroxysms of coughing  Abdominal: Soft  There is no tenderness  Musculoskeletal: She exhibits edema ( chronic lymphedema)  Lymphadenopathy:     She has cervical adenopathy (Shotty anterior cervical nodes are palpable)  Neurological: She is alert and oriented to person, place, and time  Skin: Skin is warm and dry  No rash noted  Psychiatric: She has a normal mood and affect  Her behavior is normal    Nursing note and vitals reviewed  RESULTS:    No results found for this or any previous visit (from the past 1008 hour(s))  This note was created with voice recognition software  Phonic, grammatical and spelling errors may be present within the note as a result

## 2019-08-27 ENCOUNTER — TELEPHONE (OUTPATIENT)
Dept: INTERNAL MEDICINE CLINIC | Facility: CLINIC | Age: 54
End: 2019-08-27

## 2019-08-27 DIAGNOSIS — J01.40 ACUTE NON-RECURRENT PANSINUSITIS: Primary | ICD-10-CM

## 2019-08-27 RX ORDER — CEFUROXIME AXETIL 250 MG/1
250 TABLET ORAL EVERY 12 HOURS SCHEDULED
Qty: 14 TABLET | Refills: 0 | Status: SHIPPED | OUTPATIENT
Start: 2019-08-27 | End: 2019-09-03

## 2019-08-27 NOTE — TELEPHONE ENCOUNTER
Patient finished antibiotic on Sunday and is still having sinus issues  Patient wanted to know if we could refill the antibiotic?     [facility]

## 2019-10-23 ENCOUNTER — OFFICE VISIT (OUTPATIENT)
Dept: INTERNAL MEDICINE CLINIC | Facility: CLINIC | Age: 54
End: 2019-10-23
Payer: COMMERCIAL

## 2019-10-23 VITALS
OXYGEN SATURATION: 97 % | SYSTOLIC BLOOD PRESSURE: 116 MMHG | DIASTOLIC BLOOD PRESSURE: 72 MMHG | WEIGHT: 248.8 LBS | HEIGHT: 71 IN | HEART RATE: 89 BPM | BODY MASS INDEX: 34.83 KG/M2

## 2019-10-23 DIAGNOSIS — I83.93 VARICOSE VEINS OF BOTH LOWER EXTREMITIES, UNSPECIFIED WHETHER COMPLICATED: ICD-10-CM

## 2019-10-23 DIAGNOSIS — M16.11 PRIMARY OSTEOARTHRITIS OF RIGHT HIP: ICD-10-CM

## 2019-10-23 DIAGNOSIS — Z13.6 SCREENING FOR HEART DISEASE: ICD-10-CM

## 2019-10-23 DIAGNOSIS — Z13.228 SCREENING FOR ENDOCRINE, METABOLIC AND IMMUNITY DISORDER: ICD-10-CM

## 2019-10-23 DIAGNOSIS — Z13.29 SCREENING FOR ENDOCRINE, METABOLIC AND IMMUNITY DISORDER: ICD-10-CM

## 2019-10-23 DIAGNOSIS — Z23 NEED FOR INFLUENZA VACCINATION: ICD-10-CM

## 2019-10-23 DIAGNOSIS — Z00.00 ANNUAL PHYSICAL EXAM: Primary | ICD-10-CM

## 2019-10-23 DIAGNOSIS — I87.2 CHRONIC VENOUS INSUFFICIENCY: ICD-10-CM

## 2019-10-23 DIAGNOSIS — Z13.0 SCREENING FOR ENDOCRINE, METABOLIC AND IMMUNITY DISORDER: ICD-10-CM

## 2019-10-23 PROCEDURE — 90682 RIV4 VACC RECOMBINANT DNA IM: CPT | Performed by: PHYSICIAN ASSISTANT

## 2019-10-23 PROCEDURE — 99396 PREV VISIT EST AGE 40-64: CPT | Performed by: PHYSICIAN ASSISTANT

## 2019-10-23 PROCEDURE — 90471 IMMUNIZATION ADMIN: CPT | Performed by: PHYSICIAN ASSISTANT

## 2019-10-23 NOTE — PATIENT INSTRUCTIONS
General medical exam is good  Patient will have screening labs done and we will review the results when available  Due to worsening right hip symptoms and previous x-ray showing degenerative changes along with symptoms affecting her ADLs, will refer to Orthopedics  We also did discuss smoking cessation, patient wants to consider Chantix, she would like to review the medication with her neurologist to make sure that it will not affect cluster headaches  Schedule follow-up after potentially starting Chantix

## 2019-10-23 NOTE — PROGRESS NOTES
Assessment/Plan:   Patient Instructions   General medical exam is good  Patient will have screening labs done and we will review the results when available  Due to worsening right hip symptoms and previous x-ray showing degenerative changes along with symptoms affecting her ADLs, will refer to Orthopedics  We also did discuss smoking cessation, patient wants to consider Chantix, she would like to review the medication with her neurologist to make sure that it will not affect cluster headaches  Schedule follow-up after potentially starting Chantix  Quality Measures: Tobacco Cessation Counseling: Tobacco cessation counseling was provided  The patient is sincerely urged to quit consumption of tobacco  She is ready to quit tobacco  Medication options and side effects of medication discussed  Patient agreed to medication  Varenicline (chantix) was prescribed  Return in about 1 year (around 10/23/2020) for Annual physical          Diagnoses and all orders for this visit:    Annual physical exam    Chronic venous insufficiency    Varicose veins of both lower extremities, unspecified whether complicated    Primary osteoarthritis of right hip  -     CBC and differential; Future  -     Ambulatory referral to Orthopedic Surgery; Future    Screening for heart disease  -     Comprehensive metabolic panel; Future  -     Lipid panel; Future    Screening for endocrine, metabolic and immunity disorder  -     TSH, 3rd generation; Future  -     T4, free; Future    Need for influenza vaccination  -     influenza vaccine, [id number], quadrivalent, recombinant, PF, 0 5 mL, for patients 18 yr+ (FLUBLOK)          Subjective:      Patient ID: Debora Hernandes is a 62 y o  female  58-year-old white female presents for health maintenance exam   Has known severe chronic venous insufficiency with varicose veins  These periodically cause discomfort of her legs    Also approximately 2 years ago was having right hip pain, and x-ray done showed arthritic changes present  Since that period of time she has had increased symptoms where now when stepping up or down from a platform, or even trying to get out of her car she has a sensation that her left hip is popping out of joint  She is much more discomfort on her daily activities  If she rules on the affected hip at night it will wake her up from sleep  Also history of colon polyps  Will be scheduling a repeat colonoscopy in November  History of cluster headaches for which she follows with Neurology  Tobacco abuse:  After discussion patient is interested in quitting  We discussed available options, and she would like to consider Chantix as long as it would not be a stimulant for her cluster headaches  Will check with Neurology  ALLERGIES:  No Known Allergies    CURRENT MEDICATIONS:    Current Outpatient Medications:     amitriptyline (ELAVIL) 25 mg tablet, TAKE 2 TABLETS AT BEDTIME, Disp: 180 tablet, Rfl: 3    divalproex sodium (DEPAKOTE ER) 500 mg 24 hr tablet, Take 1 tablet (500 mg total) by mouth daily (Patient not taking: Reported on 10/23/2019), Disp: 30 tablet, Rfl: 1    eletriptan (RELPAX) 40 MG tablet, Take 1 tablet at onset of migraine, May repeat once after 2 hours  Max 2 doses per 24 hours  BRAND NAME (Patient not taking: Reported on 10/23/2019), Disp: 36 tablet, Rfl: 0    eletriptan (RELPAX) 40 MG tablet, TAKE 1 TABLET AT ONSET OF MIGRAINE, MAY REPEAT ONCE AFTER 2 HOURS  MAXIMUM OF 2 DOSES PER 24 HOURS (Patient not taking: Reported on 10/23/2019), Disp: 36 tablet, Rfl: 0    HYDROcodone-acetaminophen (NORCO) 5-325 mg per tablet, Take 1 tablet by mouth every 6 (six) hours as needed  , Disp: , Rfl:     verapamil (VERELAN PM) 120 MG 24 hr capsule, Take 1 capsule (120 mg total) by mouth 2 (two) times a day 1 capsule by mouth in a m   And 2 capsules by mouth at bedtime (Patient not taking: Reported on 10/23/2019), Disp: 270 capsule, Rfl: 1    ACTIVE PROBLEM LIST:  Patient Active Problem List   Diagnosis    Thrombophlebitis of superficial veins of right lower extremity    Chronic venous insufficiency    Lymphedema    Back pain, lumbosacral    Dense breasts    Varicose veins of legs    Cluster headaches    Asymptomatic postmenopausal status       PAST MEDICAL HISTORY:  Past Medical History:   Diagnosis Date    Asymptomatic postmenopausal status     Back pain, lumbosacral     Cellulitis of left lower extremity     last assessed    3/30/16    resolved  Ramesh Guise   9/8/16      Cluster headaches     Colon polyps     Dense breasts     Foot pain, left     Head injury     Dec 2017 no LOC    Hip pain, right     Lumbosacral pain     Lymph edema     Right hip pain     Spider veins     Tendinitis of right shoulder     Varicose veins of legs        PAST SURGICAL HISTORY:  Past Surgical History:   Procedure Laterality Date    BACK SURGERY      type unknown- occurred during vaginal delivery    BREAST BIOPSY Left     WI REVISE MEDIAN N/CARPAL TUNNEL SURG Right 9/12/2016    Procedure: WRIST OPEN CARPAL TUNNEL RELEASE ;  Surgeon: Muna Bob MD;  [facility];  Service: Orthopedics    TUBAL LIGATION      VEIN LIGATION AND STRIPPING Left        FAMILY HISTORY:  Family History   Problem Relation Age of Onset   Francesca Cochran Sudden death Mother         MVA hit by a drunk     Varicose Veins Father     Heart disease Father         cardiac disorder    Breast cancer Sister         neoplasm    Other Maternal Grandmother         gyn problem    Cervical cancer Maternal Grandmother     Breast cancer Maternal Aunt         neoplasm    Migraines Daughter     Cancer Sister         Appendix    Colon cancer Neg Hx     Ovarian cancer Neg Hx     Uterine cancer Neg Hx        SOCIAL HISTORY:  Social History     Socioeconomic History    Marital status: /Civil Union     Spouse name: Not on file    Number of children: 2    Years of education: Not on file   Francesca Cochran Highest education level: Not on file   Occupational History     Comment: full time - post [employer]   Social Needs    Financial resource strain: Not on file    Food insecurity:     Worry: Not on file     Inability: Not on file    Transportation needs:     Medical: Not on file     Non-medical: Not on file   Tobacco Use    Smoking status: Current Every Day Smoker     Packs/day: 1 00    Smokeless tobacco: Never Used   Substance and Sexual Activity    Alcohol use: Not Currently     Frequency: Never    Drug use: No    Sexual activity: Yes     Birth control/protection: Surgical, Female Sterilization   Lifestyle    Physical activity:     Days per week: Not on file     Minutes per session: Not on file    Stress: Not on file   Relationships    Social connections:     Talks on phone: Not on file     Gets together: Not on file     Attends Buddhist service: Not on file     Active member of club or organization: Not on file     Attends meetings of clubs or organizations: Not on file     Relationship status: Not on file    Intimate partner violence:     Fear of current or ex partner: Not on file     Emotionally abused: Not on file     Physically abused: Not on file     Forced sexual activity: Not on file   Other Topics Concern    Not on file   Social History Narrative    Brushes teeth twice a day    Dental care regularly       Review of Systems   Constitutional: Negative for activity change, chills, fatigue and fever  HENT: Negative for congestion  Eyes: Negative for discharge and visual disturbance  Respiratory: Negative for cough, chest tightness and shortness of breath  Cardiovascular: Positive for leg swelling (Chronic secondary to lymphedema)  Negative for chest pain and palpitations  Gastrointestinal: Negative for abdominal pain, blood in stool, constipation, diarrhea, nausea and vomiting  Endocrine: Negative for polydipsia, polyphagia and polyuria     Genitourinary: Negative for difficulty urinating  Musculoskeletal: Positive for arthralgias  Negative for myalgias  Skin: Negative for rash  Allergic/Immunologic: Negative for immunocompromised state  Neurological: Positive for headaches ( history of cluster headaches)  Negative for dizziness, syncope, weakness and light-headedness  Hematological: Negative for adenopathy  Does not bruise/bleed easily  Psychiatric/Behavioral: Positive for sleep disturbance ( secondary to right hip pain)  Negative for dysphoric mood and suicidal ideas  The patient is not nervous/anxious  Objective:  Vitals:    10/23/19 1129   BP: 116/72   BP Location: Left arm   Patient Position: Sitting   Cuff Size: Large   Pulse: 89   SpO2: 97%   Weight: 113 kg (248 lb 12 8 oz)   Height: 5' 11" (1 803 m)     Body mass index is 34 7 kg/m²  Physical Exam   Constitutional: She is oriented to person, place, and time  She appears well-developed and well-nourished  No distress  Well-developed well-nourished, obese, 57-year-old white female print about stated age in no acute distress  Smell of cigarette smoke present  HENT:   Head: Normocephalic  Right Ear: External ear normal    Left Ear: External ear normal    Nose: Nose normal    Mouth/Throat: Oropharynx is clear and moist  No oropharyngeal exudate  Eyes: Pupils are equal, round, and reactive to light  Conjunctivae and EOM are normal  Right eye exhibits no discharge  Left eye exhibits no discharge  No scleral icterus  Neck: Normal range of motion  Neck supple  No JVD present  Carotid bruit is not present  No tracheal deviation present  No thyromegaly present  Cardiovascular: Normal rate, regular rhythm, normal heart sounds and intact distal pulses  Exam reveals no gallop and no friction rub  No murmur heard  Pulmonary/Chest: Effort normal  No respiratory distress  She has wheezes (Rare squeak audible right base)  She has no rales  She exhibits no tenderness  Abdominal: Soft   Bowel sounds are normal  She exhibits no distension and no mass  There is no hepatosplenomegaly  There is no tenderness  There is no rebound, no guarding and no CVA tenderness  Musculoskeletal: Normal range of motion  She exhibits edema ( moderate to severe swelling of bilateral lower legs, however they are soft,, nontender, Homans negative, no deep calf tenderness, multiple superficial varicosities)  She exhibits no tenderness or deformity  Lymphadenopathy:     She has no cervical adenopathy  Neurological: She is alert and oriented to person, place, and time  She has normal reflexes  She displays normal reflexes  No cranial nerve deficit or sensory deficit  She exhibits normal muscle tone  Coordination normal    Skin: Skin is warm and dry  No rash noted  Psychiatric: She has a normal mood and affect  Her behavior is normal  Judgment and thought content normal    Nursing note and vitals reviewed  RESULTS:    No results found for this or any previous visit (from the past 1008 hour(s))  This note was created with voice recognition software  Phonic, grammatical and spelling errors may be present within the note as a result

## 2019-10-25 DIAGNOSIS — Z72.0 TOBACCO ABUSE: Primary | ICD-10-CM

## 2019-10-25 RX ORDER — VARENICLINE TARTRATE 1 MG/1
1 TABLET, FILM COATED ORAL 2 TIMES DAILY
Qty: 60 TABLET | Refills: 2 | Status: SHIPPED | OUTPATIENT
Start: 2019-10-25 | End: 2020-02-20 | Stop reason: SDUPTHER

## 2019-10-25 RX ORDER — VARENICLINE TARTRATE 25 MG
KIT ORAL
Qty: 53 TABLET | Refills: 0 | Status: SHIPPED | OUTPATIENT
Start: 2019-10-25 | End: 2020-01-24 | Stop reason: SDUPTHER

## 2019-10-31 LAB
ALBUMIN SERPL-MCNC: 4.2 G/DL (ref 3.5–5.5)
ALBUMIN/GLOB SERPL: 1.6 {RATIO} (ref 1.2–2.2)
ALP SERPL-CCNC: 114 IU/L (ref 39–117)
ALT SERPL-CCNC: 31 IU/L (ref 0–32)
AST SERPL-CCNC: 21 IU/L (ref 0–40)
BASOPHILS # BLD AUTO: 0 X10E3/UL (ref 0–0.2)
BASOPHILS NFR BLD AUTO: 1 %
BILIRUB SERPL-MCNC: <0.2 MG/DL (ref 0–1.2)
BUN SERPL-MCNC: 13 MG/DL (ref 6–24)
BUN/CREAT SERPL: 22 (ref 9–23)
CALCIUM SERPL-MCNC: 9.2 MG/DL (ref 8.7–10.2)
CHLORIDE SERPL-SCNC: 105 MMOL/L (ref 96–106)
CHOLEST SERPL-MCNC: 183 MG/DL (ref 100–199)
CO2 SERPL-SCNC: 23 MMOL/L (ref 20–29)
CREAT SERPL-MCNC: 0.59 MG/DL (ref 0.57–1)
EOSINOPHIL # BLD AUTO: 0.1 X10E3/UL (ref 0–0.4)
EOSINOPHIL NFR BLD AUTO: 2 %
ERYTHROCYTE [DISTWIDTH] IN BLOOD BY AUTOMATED COUNT: 14.9 % (ref 12.3–15.4)
GLOBULIN SER-MCNC: 2.6 G/DL (ref 1.5–4.5)
GLUCOSE SERPL-MCNC: 88 MG/DL (ref 65–99)
HCT VFR BLD AUTO: 40.7 % (ref 34–46.6)
HDLC SERPL-MCNC: 49 MG/DL
HGB BLD-MCNC: 13.4 G/DL (ref 11.1–15.9)
IMM GRANULOCYTES # BLD: 0 X10E3/UL (ref 0–0.1)
IMM GRANULOCYTES NFR BLD: 0 %
LDLC SERPL CALC-MCNC: 115 MG/DL (ref 0–99)
LYMPHOCYTES # BLD AUTO: 1.7 X10E3/UL (ref 0.7–3.1)
LYMPHOCYTES NFR BLD AUTO: 26 %
MCH RBC QN AUTO: 31.2 PG (ref 26.6–33)
MCHC RBC AUTO-ENTMCNC: 32.9 G/DL (ref 31.5–35.7)
MCV RBC AUTO: 95 FL (ref 79–97)
MONOCYTES # BLD AUTO: 0.5 X10E3/UL (ref 0.1–0.9)
MONOCYTES NFR BLD AUTO: 8 %
NEUTROPHILS # BLD AUTO: 4.2 X10E3/UL (ref 1.4–7)
NEUTROPHILS NFR BLD AUTO: 63 %
PLATELET # BLD AUTO: 231 X10E3/UL (ref 150–450)
POTASSIUM SERPL-SCNC: 4.6 MMOL/L (ref 3.5–5.2)
PROT SERPL-MCNC: 6.8 G/DL (ref 6–8.5)
RBC # BLD AUTO: 4.3 X10E6/UL (ref 3.77–5.28)
SL AMB EGFR AFRICAN AMERICAN: 118 ML/MIN/1.73
SL AMB EGFR NON AFRICAN AMERICAN: 103 ML/MIN/1.73
SL AMB VLDL CHOLESTEROL CALC: 19 MG/DL (ref 5–40)
SODIUM SERPL-SCNC: 143 MMOL/L (ref 134–144)
T4 FREE SERPL-MCNC: 1.26 NG/DL (ref 0.82–1.77)
TRIGL SERPL-MCNC: 94 MG/DL (ref 0–149)
TSH SERPL DL<=0.005 MIU/L-ACNC: 0.74 UIU/ML (ref 0.45–4.5)
WBC # BLD AUTO: 6.6 X10E3/UL (ref 3.4–10.8)

## 2019-11-14 ENCOUNTER — TELEPHONE (OUTPATIENT)
Dept: NEUROLOGY | Facility: CLINIC | Age: 54
End: 2019-11-14

## 2019-11-14 DIAGNOSIS — G43.009 MIGRAINE WITHOUT AURA AND WITHOUT STATUS MIGRAINOSUS, NOT INTRACTABLE: ICD-10-CM

## 2019-11-14 DIAGNOSIS — G44.021 INTRACTABLE CHRONIC CLUSTER HEADACHE: ICD-10-CM

## 2019-11-14 RX ORDER — ELETRIPTAN HYDROBROMIDE 40 MG/1
TABLET, FILM COATED ORAL
Qty: 36 TABLET | Refills: 0 | Status: SHIPPED | OUTPATIENT
Start: 2019-11-14 | End: 2021-04-29

## 2019-11-14 RX ORDER — DIVALPROEX SODIUM 500 MG/1
500 TABLET, EXTENDED RELEASE ORAL DAILY
Qty: 30 TABLET | Refills: 1 | Status: SHIPPED | OUTPATIENT
Start: 2019-11-14 | End: 2019-12-04 | Stop reason: ALTCHOICE

## 2019-11-14 NOTE — TELEPHONE ENCOUNTER
Pt calling in to report that her cluster headaches began  She has no other symptoms besides the headache  It started this week  She started chantix and is unsure if it is related to this, but usually this is the time of year it happens  She also started on Meloxicam for arthritis 15 mg oral daily  Taking amitriptyline 2 tabs at HS     So far, she has only taken Tylenol OTC today for headache, has not helped  Has taken depakote in the past, thinks it may have helped  Steriod did not help, unsure if she took olanzapine  Call back number [phone] -okay to leave a detailed message  Uses [employer] on file

## 2019-11-15 RX ORDER — ELETRIPTAN HYDROBROMIDE 40 MG/1
TABLET, FILM COATED ORAL
Qty: 36 TABLET | Refills: 6 | Status: SHIPPED | OUTPATIENT
Start: 2019-11-15 | End: 2020-01-24 | Stop reason: SDUPTHER

## 2019-11-21 NOTE — TELEPHONE ENCOUNTER
Patient restarted depakote 500mg hs and has also been taking the relpax  She continues with the cluster headaches  States that last night she was up every 1 1/2 hours  This has been going on for one week  She does not feel that the depakote has given her any relief and that she is feeling worse  Patient questioning what else she should do       CB# [phone]

## 2019-11-21 NOTE — TELEPHONE ENCOUNTER
Spoke with patient, have recommended a trial of melatonin 10-15 mg before bed - if this is not effective alternative such as steroids or verapamil can be initiated

## 2019-11-25 NOTE — TELEPHONE ENCOUNTER
Called patient, saw relief with melatonin for the 1st 2 days and started experiencing headaches again last night is advised to start verapamil 120 mg for the 1st 2 days and then increase to 240 mg which was also her previous dosage  She may continue with Depakote 500 mg at bedtime and melatonin

## 2019-12-04 ENCOUNTER — OFFICE VISIT (OUTPATIENT)
Dept: NEUROLOGY | Facility: CLINIC | Age: 54
End: 2019-12-04
Payer: COMMERCIAL

## 2019-12-04 VITALS
WEIGHT: 258.2 LBS | HEART RATE: 89 BPM | DIASTOLIC BLOOD PRESSURE: 80 MMHG | HEIGHT: 71 IN | SYSTOLIC BLOOD PRESSURE: 140 MMHG | BODY MASS INDEX: 36.15 KG/M2

## 2019-12-04 DIAGNOSIS — G44.021 INTRACTABLE CHRONIC CLUSTER HEADACHE: Primary | ICD-10-CM

## 2019-12-04 DIAGNOSIS — G50.1 ATYPICAL FACIAL PAIN: ICD-10-CM

## 2019-12-04 PROCEDURE — 99214 OFFICE O/P EST MOD 30 MIN: CPT | Performed by: PSYCHIATRY & NEUROLOGY

## 2019-12-04 RX ORDER — PREGABALIN 75 MG/1
75 CAPSULE ORAL 2 TIMES DAILY
Qty: 60 CAPSULE | Refills: 1 | Status: SHIPPED | OUTPATIENT
Start: 2019-12-04 | End: 2020-01-31 | Stop reason: SDUPTHER

## 2019-12-04 RX ORDER — MELOXICAM 15 MG/1
TABLET ORAL AS NEEDED
COMMUNITY
End: 2021-04-21 | Stop reason: ALTCHOICE

## 2019-12-04 RX ORDER — VERAPAMIL HYDROCHLORIDE 240 MG/1
240 CAPSULE, EXTENDED RELEASE ORAL
Qty: 30 CAPSULE | Refills: 3 | Status: SHIPPED | OUTPATIENT
Start: 2019-12-04 | End: 2019-12-06 | Stop reason: SDUPTHER

## 2019-12-04 NOTE — PROGRESS NOTES
Progress Note - Neurology   Heriberto Sarmiento 62 y o  female MRN: [id number]  Unit/Bed#:  Encounter: [id number]      Subjective:   Patient is here for a follow-up visit with cluster headaches and over the las 2 weeks has been experiencing worsening headaches which she describes as mostly in the right frontal head region radiating down to the right maxillary region and the intensity can be quite sharp and can last for 15-20 minutes at a time  The pain generally starts at night and patient sees relief with the help of Relpax and if she does not use Relpax the headache cycle continues for 2-3 times in the night  The headache wakes her up from sleep and currently patient has been on Depakote, verapamil, with no relief of symptoms  She denies any other neurological symptoms and at baseline also has a history of low back pain, left hip pain, requiring hip replacement in the near future and lumbar radiculopathy in the past     ROS:   Review of Systems   Constitutional: Negative  Negative for appetite change and fever  HENT: Negative  Negative for hearing loss, tinnitus, trouble swallowing and voice change  Eyes: Negative  Negative for photophobia and pain  Respiratory: Negative  Negative for shortness of breath  Cardiovascular: Negative  Negative for palpitations  Gastrointestinal: Negative  Negative for nausea and vomiting  Endocrine: Negative  Negative for cold intolerance and heat intolerance  Genitourinary: Negative  Negative for dysuria, frequency and urgency  Musculoskeletal: Positive for myalgias (hip)  Negative for neck pain  Skin: Negative  Negative for rash  Neurological: Positive for headaches  Negative for dizziness, tremors, seizures, syncope, facial asymmetry, speech difficulty, weakness, light-headedness and numbness  Hematological: Negative  Does not bruise/bleed easily  Psychiatric/Behavioral: Positive for sleep disturbance (When she has a headache)   Negative for confusion and hallucinations  Vitals: There were no vitals filed for this visit  ,There is no height or weight on file to calculate BMI  MEDS:      Current Outpatient Medications:     amitriptyline (ELAVIL) 25 mg tablet, TAKE 2 TABLETS AT BEDTIME, Disp: 180 tablet, Rfl: 3    divalproex sodium (DEPAKOTE ER) 500 mg 24 hr tablet, Take 1 tablet (500 mg total) by mouth daily, Disp: 30 tablet, Rfl: 1    eletriptan (RELPAX) 40 MG tablet, Take 1 tablet at onset of migraine, May repeat once after 2 hours  Max 2 doses per 24 hours  BRAND NAME, Disp: 36 tablet, Rfl: 0    varenicline (CHANTIX) 1 mg tablet, Take 1 tablet (1 mg total) by mouth 2 (two) times a day, Disp: 60 tablet, Rfl: 2    verapamil (VERELAN PM) 120 MG 24 hr capsule, Take 1 capsule (120 mg total) by mouth 2 (two) times a day 1 capsule by mouth in a m  And 2 capsules by mouth at bedtime, Disp: 270 capsule, Rfl: 1    eletriptan (RELPAX) 40 MG tablet, TAKE 1 TABLET AT ONSET OF MIGRAINE  MAY REPEAT ONCE AFTER 2 HOURS  MAXIMUM OF 2 DOSES PER 24 HOURS (Patient not taking: Reported on 12/4/2019), Disp: 36 tablet, Rfl: 6    HYDROcodone-acetaminophen (NORCO) 5-325 mg per tablet, Take 1 tablet by mouth every 6 (six) hours as needed  , Disp: , Rfl:     varenicline (CHANTIX SARTHAK) 0 5 MG X 11 & 1 MG X 42 tablet, Take one 0 5mg tab by mouth 1x daily for 3 days, then increase to one 0 5mg tab 2x daily for 3 days, then increase to one 1mg tab 2x daily (Patient not taking: Reported on 12/4/2019), Disp: 53 tablet, Rfl: 0  :    Physical Exam:  General appearance: alert, appears stated age and cooperative  Head: Normocephalic, without obvious abnormality, atraumatic    On examination patient is alert awake oriented, speech is fluent, cranial nerves 2-12 intact with no evidence of any Mark's, motor and sensory exam remains essentially unchanged no new focal deficits noted, and no evidence of any dysmetria  Her gait is normal based      Lab Results: I have personally reviewed pertinent reports  Imaging Studies: I have personally reviewed pertinent reports  Assessment:  1  Cluster headaches versus atypical facial pain  Plan:  Patient is advised to discontinue Depakote, will increase the dose of verapamil to 240 mg which was her previous dose in the past and was effective, will also add Lyrica 75 mg at bedtime for the 1st 2 days and then increase to 75 twice a day and will continue with Relpax on a p r n  Basis  She is advised to call me in 1 week if she sees no relief  She will return back to see me in 3 months  12/4/2019,11:41 AM    Dictation voice to text software has been used in the creation of this document  Please consider this in light of any contextual or grammatical errors

## 2019-12-06 ENCOUNTER — TELEPHONE (OUTPATIENT)
Dept: NEUROLOGY | Facility: CLINIC | Age: 54
End: 2019-12-06

## 2019-12-06 DIAGNOSIS — G44.021 INTRACTABLE CHRONIC CLUSTER HEADACHE: ICD-10-CM

## 2019-12-06 RX ORDER — VERAPAMIL HYDROCHLORIDE 240 MG/1
240 CAPSULE, EXTENDED RELEASE ORAL
Qty: 30 CAPSULE | Refills: 3 | Status: SHIPPED | OUTPATIENT
Start: 2019-12-06 | End: 2020-03-11 | Stop reason: SDUPTHER

## 2019-12-06 NOTE — TELEPHONE ENCOUNTER
Called patient, informed her that a change the prescription to verapamil 240 mg 1 capsule at bedtime

## 2019-12-06 NOTE — TELEPHONE ENCOUNTER
Please Advise:    Patient called in stating that pharmacy cannot fill the verapamil because the instructions are not clear  Progress note: verapamil (VERELAN PM) 120 MG 24 hr capsule, Take 1 capsule (120 mg total) by mouth 2 (two) times a day 1 capsule by mouth in a m  And 2 capsules by mouth at bedtime    Script: Take 1 capsule (240 mg total) by mouth daily at bedtime 1 capsule by mouth in a m   And 2 capsules by mouth at bedtime    [phone]: Ok to leave detailed message

## 2019-12-10 RX ORDER — METHYLPREDNISOLONE 4 MG/1
TABLET ORAL
Qty: 1 EACH | Refills: 1 | Status: SHIPPED | OUTPATIENT
Start: 2019-12-10 | End: 2021-04-21 | Stop reason: ALTCHOICE

## 2019-12-10 NOTE — TELEPHONE ENCOUNTER
Patient requesting to speak to Dr Panchito Zendejas  She reports the verapamil and lyrica aren't helping and thinks she wants to see a headache specialist      Please contact patient @ either number on file  Okay to leave message

## 2019-12-10 NOTE — TELEPHONE ENCOUNTER
Called patient, advised to continue verapamil and Lyrica and will try her on Medrol Dosepak at this time  Patient will call me back if she sees no relief

## 2019-12-11 NOTE — TELEPHONE ENCOUNTER
Spoke to pt recommendations were addressed, pt has been scheduled for 1/24 at [facility] location with Dr Emmie Xie

## 2020-01-23 NOTE — PROGRESS NOTES
[facility] Consult  PATIENT:  Tip Martin  MRN:  [id number]  :  1965  DATE OF SERVICE:  2020  REFERRED BY: Millie Lopez MD  PMD: Celso Phoenix, MD    Assessment/Plan:     Tip Martin is a delightful 62 y o  female with a past medical history that includes cluster headaches, chronic venous insufficiency, lymphedema, chronic back pain on and off, hip arthritis, obesity referred here for evaluation of headache  She has followed with my neurology colleagues in the past  My initial visit 2020    Chronic cluster headache, not intractable  Anisocoria  Patient reports a long history of chronic headaches since her 35s  She reports her headaches, in clusters typically daily for 3 months at a time and occur in the evenings  They can last anywhere from 15 minutes up to an hour and may happen 3 times at night in 2 times before bed  They tend to build until they reach maximal intensity and then resolved  She reports may start with right teeth pain and then become right frontal and right retro-orbital stabbing pain although the most recent episode was mid frontal sharp electrical   Pain is severe  She reports associated features include photophobia, photophobia, right as lacrimation, nasal congestion    - as of 2020: 3 months at a time with have daily headaches daily    We discussed most likely etiology of her headaches would be cluster headache although trigeminal autonomic cephalalgia such as hemicrania continua is a possibility as well  Workup:  - Neurologic assessment reveals normal neurological exam, except for anisocoria with right pupil larger than left  - due to history of chronic headaches with new features as well as focal abnormal neurologic exam findings, I recommend further evaluation with MRI brain with without contrast to rule out structural or treatable cause of symptoms    -CMP and B12  * will consider ordering EKG if plan to increase verapamil for monitoring    Preventative:  - we discussed headache hygiene and lifestyle factors that may improve headaches  - recommended wean off amitriptyline since she is on very low dose this is less likely doing anything  Discussed proper use, possible side effects and risks  - she is currently on verapamil 240 mg daily and may have been higher in the past, we discussed this is a medication that could be increased during a cluster of headaches  Discussed proper use, possible side effects and risks   -she is also currently on pregabalin 75 mg daily which she started 1 week before being headache free over the past 6 weeks of this may have made a difference  , room to increase in the future  Discussed proper use, possible side effects and risks  - of note through other providers she is on daily meloxicam which is helped with her head chronic hip pain  - past/failed:  Carbamazepine, Depakote, topiramate, gabapentin, metoprolol  -future options:  Could consider CGRP med     Abortive:  - discussed not taking over-the-counter or prescription pain medications more than 3 days per week to prevent medication overuse/rebound headache  - she reports Relpax previously prescribed by my neurology colleague works well   -past:  Steroids helped in the past but lately has not helped, sumatriptan p o  Nasal helped for a little while and was too expensive, rizatriptan she believes was helpful but too expensive - typically the pain starts too fast and hands before she is able to take something     Claustrophobia  -     LORazepam (ATIVAN) 1 mg tablet; Take 1 mg 1 hour prior to MRI, may repeat x1 in 40 minutes p r n  Claustrophobia/anxiety  No driving  Discussed proper use, possible side effects and risks  Patient instructions      Additional Testing:   Neurodiagnostic workup:   MRI brain with without contrast with labs prior    Headache/migraine treatment:   Abortive medications (for immediate treatment of a headache):    It is ok to take ibuprofen, acetaminophen or naproxen (Advil, Tylenol,  Aleve, Excedrin) if they help your headaches you should limit these to No more than 3 times a week to avoid medication overuse/rebound headaches  For your more moderate to severe migraines take this medication early   Eletriptan 40mg tabs - take one at the onset of headache  May repeat one time after 1-2 hours if pain has not resolved  Max 2 a day    Prescription preventive medications for headaches/migraines   (to take every day to help prevent headaches - not to take at the time of headache):  [x] continue Lyrica/pregabalin at current dose, could not consider increasing the future if needed  -continue verapamil 240 mg daily at bedtime however during a cluster episode we could acutely increase this and we could always consider in the future also changing this to short-acting verapamil which sometimes works better than long-acting  -decrease amitriptyline to 20 mg nightly for 2 weeks and then 10 mg nightly for 2 weeks and then stop    *Typically these types of medications take time untill you see the benefit, although some may see improvement in days, often it may take weeks, especially if the medication is being titrated up to a beneficial level  Please contact us if there are any concerns or questions regarding the medication  Sleep/headache prevention:    [x] Melatonin - you may take 3 mg nightly for sleep  You should take this 1 hour prior to bedtime consistently every night for it to work  It works by gradually helping to adjust your sleep time over days to weeks, rather than immediately making you feel sleepy  Self-Monitoring:  [x] Headache calendar  Each day stefanie a number from 0-10 indicating if there was a headache and how bad it was  This can be used to monitor gradual improvement and is helpful to make medication adjustments  You can do this on paper or there is an SEAN for a smart phone called "Migraine e Diary"       Lifestyle Recommendations:  [x] SLEEP - Maintain a regular sleep schedule: Adults need at least 7-8 hours of uninterrupted a night  Maintain good sleep hygiene:  Going to bed and waking up at consistent times, avoiding excessive daytime naps, avoiding caffeinated beverages in the evening, avoid excessive stimulation in the evening and generally using bed primarily for sleeping  One hour before bedtime would recommend turning lights down lower, decreasing your activity (may read quietly, listen to music at a low volume)  When you get into bed, should eliminate all technology (no texting, emailing, playing with your phone, iPad or tablet in bed)  [x] HYDRATION - Maintain good hydration  Drink  2L of fluid a day (4 typical small water bottles)  [x] DIET - Maintain good nutrition  In particular don't skip meals and try and eat healthy balanced meals regularly  [x] TRIGGERS - Look for other triggers and avoid them: Limit caffeine to 1-2 cups a day or less  Avoid dietary triggers that you have noticed bring on your headaches (this could include aged cheese, peanuts, MSG, aspartame and nitrates)  [x] EXERCISE - physical exercise as we all know is good for you in many ways, and not only is good for your heart, but also is beneficial for your mental health, cognitive health and  chronic pain/headaches  I would encourage at the least 5 days of physical exercise weekly for at least 30 minutes  Education and Follow-up  [x] Please call with any questions or concerns  Of course if any new concerning symptoms go to the emergency department    [x] Follow up with Dr Jean Buenrostro in 3 months and Dr Nilsa Martino in the future who is our top headache specialist     - As we discussed if there are no abnormalities on the brain MRI that need urgent intervention (very often there are incidental findings that may not mean anything significant and are better discussed in person), you will not necessarily receive a call from us, but feel free to call in to check on the status of the report (since not knowing can be anxiety provoking) and the nurses will let you know the result or make sure I have nothing to pass on regarding the results first   Ideally, all of this will be discussed in detail in the follow-up visit  CC:   We had the pleasure of evaluating Carolina Degroot in neurological consultation today  Carolina Degroot is a 62 y o    right handed female who presents today for evaluation of headaches  History obtained from patient as well as available medical record review  History of Present Illness:   Current medical illnesses include cluster headaches, chronic venous insufficiency, lymphedema, chronic back pain on and off, hip arthritis, obesity    She has followed with my neurology colleague Dr Yumiko Yu, please see EMR for details, last note 12/04/2019    Headaches started at what age? Late 25 years old  How often do the headaches occur?   - as of 1/24/2020: 3 months at a time headaches daily, none in 6 weeks   What time of the day do the headaches start? only at night,  most of the time during sleep or before she sleeps at night  How long do the headaches last? 15 mins to an hour - 3 times at night and 2 times before bed - worsens until it gets to a point where it blows up and then goes away   Are you ever headache free? Yes    Aura? without aura     Where is your headache located and pain quality?   - starts with right teeth pain and feels it coming   - usually right frontal and right retroorbital - stabbing  - midfrontal lately - sharp, electtical  What is the intensity of pain?  Average: 15/10  Associated symptoms:   [] Nausea       [] Vomiting        [] Diarrhea  [x] Insomnia    [] Stiff or sore neck   [x] Problems with concentration  [x] Photophobia     [x]Phonophobia      [] Osmophobia  [] Blurred vision   [x] Prefer quiet, dark room  [] Light-headed or dizzy     [] Tinnitus    [] Hands or feet tingle or feel numb/paresthesias      [] Red ear      [] Ptosis      [] Facial droop  [x] Lacrimation - right tearing  [x] Nasal congestion  [] Flushing of face  [] Change in pupil size    Number of days missed per month because of headaches:  Work (or school) days: 0    Things that make the headache worse? No specific movements, any movement     Headache triggers:  Unknown   What time of the year do headaches occur more frequently?   are usually worse winter but has also had in July    Have you seen someone else for headaches or pain? Yes, Dr Joe Bailey   Have you had trigger point injection performed and how often? No  Have you had Botox injection performed and how often? No   Have you had epidural injections or transforaminal injections performed? No  Are you current pregnant or planning on getting pregnant? No  Have you ever had any Brain imaging? Yes multiple normal     What medications do you take or have you taken for your headaches?    ABORTIVE:    OTC medications have been ineffective       Relpax - 12/month - if takes before bed able to sleep for 4 hours -     Has norco for back once a year when back goes out     Past:  Steroids helped in the past, but last couple bathes lasted a day only  Sumatriptan PO and nasal helped for a little   Rizatriptan - was helpful and too expensive     PREVENTIVE:     - Verapamil 240 mg daily (in past 240 +120)   - pregabalin 75 mg daily- started 1 week before being 6 weeks headache free  - amitriptyline 25 mg - been on for years - up to 50 in cycle   For hip pain: meloxicam     Past/failed:   carbamazepine - did not help  depakote did not help   topiramate 100 mg over 3 months   Gabapentin helped in the past and not recently  Metoprolol      LIFESTYLE  Sleep   - averages: normally sleeps well    Physical activity: none     Water: 0 per day  Caffeine: 8 cups per day  Diet:  overeating    Mood:   Denies history of anxiety or depression or other diagnosed mood disorder    The following portions of the patient's history were reviewed and updated as appropriate: allergies, current medications, past family history, past medical history, past social history, past surgical history and problem list     Pertinent family history:  Family history of headaches:  Migraines in daughter, sister and mother  Any family history of aneurysms  No    Pertinent social history:  Work: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  Education: 12th   Lives with     Illicit Drugs: denies  Alcohol/tobacco: quiting tobacco - 5 cigs improvement, alcohol no     Past Medical History:     Past Medical History:   Diagnosis Date    Asymptomatic postmenopausal status     Back pain, lumbosacral     Cellulitis of left lower extremity     last assessed    3/30/16    resolved  Roseann Arbour   9/8/16      Cluster headaches     Colon polyps     Dense breasts     Foot pain, left     Head injury     Dec 2017 no LOC    Hip pain, right     Lumbosacral pain     Lymph edema     Right hip pain     Spider veins     Tendinitis of right shoulder     Varicose veins of legs        Patient Active Problem List   Diagnosis    Thrombophlebitis of superficial veins of right lower extremity    Chronic venous insufficiency    Lymphedema    Back pain, lumbosacral    Dense breasts    Varicose veins of legs    Cluster headaches    Asymptomatic postmenopausal status       Medications:      Current Outpatient Medications   Medication Sig Dispense Refill    amitriptyline (ELAVIL) 25 mg tablet TAKE 2 TABLETS AT BEDTIME 180 tablet 3    eletriptan (RELPAX) 40 MG tablet Take 1 tablet at onset of migraine, May repeat once after 2 hours  Max 2 doses per 24 hours   BRAND NAME 36 tablet 0    HYDROcodone-acetaminophen (NORCO) 5-325 mg per tablet Take 1 tablet by mouth every 6 (six) hours as needed        meloxicam (MOBIC) 15 mg tablet meloxicam 15 mg tablet   TAKE 1 TABLET BY MOUTH EVERY DAY      pregabalin (LYRICA) 75 mg capsule Take 1 capsule (75 mg total) by mouth 2 (two) times a day 60 capsule 1    varenicline (CHANTIX) 1 mg tablet Take 1 tablet (1 mg total) by mouth 2 (two) times a day 60 tablet 2    verapamil (VERELAN) 240 MG 24 hr capsule Take 1 capsule (240 mg total) by mouth daily at bedtime 30 capsule 3    methylPREDNISolone 4 MG tablet therapy pack Use as directed on package (Patient not taking: Reported on 1/24/2020) 1 each 1     No current facility-administered medications for this visit           Allergies:    No Known Allergies    Family History:     Family History   Problem Relation Age of Onset   [place] Sudden death Mother         MVA hit by a drunk     Varicose Veins Father     Heart disease Father         cardiac disorder    Breast cancer Sister         neoplasm    Other Maternal Grandmother         gyn problem    Cervical cancer Maternal Grandmother     Breast cancer Maternal Aunt         neoplasm    Migraines Daughter     Cancer Sister         Appendix    Colon cancer Neg Hx     Ovarian cancer Neg Hx     Uterine cancer Neg Hx        Social History:       Social History     Socioeconomic History    Marital status: /Civil Union     Spouse name: Not on file    Number of children: 2    Years of education: Not on file    Highest education level: Not on file   Occupational History     Comment: full time - post office   Social Needs    Financial resource strain: Not on file    Food insecurity:     Worry: Not on file     Inability: Not on file    Transportation needs:     Medical: Not on file     Non-medical: Not on file   Tobacco Use    Smoking status: Current Every Day Smoker     Packs/day: 1 00    Smokeless tobacco: Never Used   Substance and Sexual Activity    Alcohol use: Not Currently     Frequency: Never    Drug use: No    Sexual activity: Yes     Birth control/protection: Surgical, Female Sterilization   Lifestyle    Physical activity:     Days per week: Not on file     Minutes per session: Not on file    Stress: Not on file   Relationships    Social connections:     Talks on phone: Not on file     Gets together: Not on file     Attends Congregation service: Not on file     Active member of club or organization: Not on file     Attends meetings of clubs or organizations: Not on file     Relationship status: Not on file    Intimate partner violence:     Fear of current or ex partner: Not on file     Emotionally abused: Not on file     Physically abused: Not on file     Forced sexual activity: Not on file   Other Topics Concern    Not on file   Social History Narrative    Brushes teeth twice a day    Dental care regularly        Two children         Objective:     Physical Exam:                                                                 Vitals:            Constitutional:    /60 (BP Location: Left arm, Patient Position: Sitting, Cuff Size: Adult)   Pulse 60   Resp 16   Ht 5' 11" (1 803 m)   Wt 119 kg (262 lb 12 8 oz)   LMP 01/01/2015   BMI 36 65 kg/m²   BP Readings from Last 3 Encounters:   01/24/20 130/60   12/04/19 140/80   10/23/19 116/72     Pulse Readings from Last 3 Encounters:   01/24/20 60   12/04/19 89   10/23/19 89         Well developed, well nourished, well groomed  No dysmorphic features  HEENT:  Normocephalic atraumatic  No meningismus  Oropharynx is clear and moist  No oral mucosal lesions  Chest:  Respirations regular and unlabored  Cardiovascular:  Regular rate, intact distal pulses  Distal extremities warm without palpable edema or tenderness, no observed significant swelling  Musculoskeletal:  Full range of motion  (see below under neurologic exam for evaluation of motor function and gait)   Skin:  warm and dry, not diaphoretic  No apparent birthmarks or stigmata of neurocutaneous disease  Psychiatric:  Normal behavior and appropriate affect        Neurological Examination:     Mental status/cognitive function:   Orientated to time, place and person  Recent and remote memory intact   Attention span and concentration as well as fund of knowledge are appropriate for age  Normal language and spontaneous speech  Cranial Nerves:  II-visual fields full  Fundi poorly visualized due to pupillary constriction  III, IV, VI-Pupils left pupil 0 5 mm larger than right, round, and reactive to light and accomodation  Extraocular movements were full and conjugate without nystagmus  V-facial sensation symmetric  VII-facial expression symmetric, intact forehead wrinkle, strong eye closure, symmetric smile    VIII-hearing grossly intact bilaterally   IX, X-palate elevation symmetric, no dysarthria  XI-shoulder shrug strength intact    XII-tongue protrusion midline  Motor Exam: symmetric bulk and tone throughout, no pronator drift  Power/strength 5/5 bilateral upper and lower extremities, no atrophy, fasciculations or abnormal movements noted  Sensory: grossly intact light touch in all extremities  Reflexes: brachioradialis 2+, biceps 2+, knee 2+, ankle 2+ bilaterally  No ankle clonus  Coordination: Finger nose finger intact bilaterally, no apparent dysmetria, ataxia or tremor noted  Gait: steady casual and tandem gait  Romberg Negative  Pertinent lab results:    10/29/2019 CMP and CBC unremarkable, TSH normal    06/08/2016 B12 283    Imaging:   MRI of the brain around 4 years ago normal per patient     MRA head 01/21/2015:  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  No evidence of intracranial aneurysm, AVM, cortical cerebral arterial stenosis or occlusion  - follow up CT scan was normal, no cyst        Review of Systems:     ROS obtained by medical assistant Personally reviewed and updated if indicated  Patient listed multiple symptoms they are not currently experiencing  Review of Systems   Constitutional: Positive for appetite change  Negative for fever  HENT: Negative  Negative for hearing loss, tinnitus, trouble swallowing and voice change     Eyes: Positive for visual disturbance (Sensitivity to light and noise) with headaches  Negative for photophobia and pain  Respiratory: Negative  Negative for shortness of breath  Cardiovascular: Negative  Negative for palpitations  Gastrointestinal: Negative  Negative for nausea and vomiting  Endocrine: Negative  Negative for cold intolerance and heat intolerance  Genitourinary: Negative  Negative for dysuria, frequency and urgency  Musculoskeletal: Negative  Negative for myalgias and neck pain  Skin: Negative  Negative for rash  Allergic/Immunologic: Negative  Neurological: Positive for headaches  Negative for dizziness, tremors, seizures, syncope, facial asymmetry, speech difficulty, weakness, light-headedness and numbness  Hematological: Negative  Does not bruise/bleed easily  Psychiatric/Behavioral: Positive for sleep disturbance  Negative for confusion and hallucinations  I have spent 55 minutes with Patient  today in which greater than 50% of this time was spent in counseling/coordination of care regarding Diagnostic results, Prognosis, Risks and benefits of tx options, Intructions for management, Patient  education, Importance of tx compliance, Risk factor reductions and Impressions        Author:  Cindy Moore MD 1/24/2020 1:47 PM

## 2020-01-24 ENCOUNTER — TELEPHONE (OUTPATIENT)
Dept: NEUROLOGY | Facility: CLINIC | Age: 55
End: 2020-01-24

## 2020-01-24 ENCOUNTER — OFFICE VISIT (OUTPATIENT)
Dept: NEUROLOGY | Facility: CLINIC | Age: 55
End: 2020-01-24
Payer: COMMERCIAL

## 2020-01-24 VITALS
SYSTOLIC BLOOD PRESSURE: 130 MMHG | DIASTOLIC BLOOD PRESSURE: 60 MMHG | BODY MASS INDEX: 36.79 KG/M2 | RESPIRATION RATE: 16 BRPM | HEART RATE: 60 BPM | HEIGHT: 71 IN | WEIGHT: 262.8 LBS

## 2020-01-24 DIAGNOSIS — F40.240 CLAUSTROPHOBIA: ICD-10-CM

## 2020-01-24 DIAGNOSIS — H57.02 ANISOCORIA: ICD-10-CM

## 2020-01-24 DIAGNOSIS — G44.029 CHRONIC CLUSTER HEADACHE, NOT INTRACTABLE: Primary | ICD-10-CM

## 2020-01-24 PROCEDURE — 99215 OFFICE O/P EST HI 40 MIN: CPT | Performed by: PSYCHIATRY & NEUROLOGY

## 2020-01-24 RX ORDER — DIVALPROEX SODIUM 500 MG/1
TABLET, EXTENDED RELEASE ORAL
COMMUNITY
End: 2020-01-24

## 2020-01-24 RX ORDER — LORAZEPAM 1 MG/1
TABLET ORAL
Qty: 2 TABLET | Refills: 0 | Status: SHIPPED | OUTPATIENT
Start: 2020-01-24 | End: 2021-04-21 | Stop reason: ALTCHOICE

## 2020-01-24 RX ORDER — AMITRIPTYLINE HYDROCHLORIDE 10 MG/1
TABLET, FILM COATED ORAL
Qty: 42 TABLET | Refills: 0 | Status: SHIPPED | OUTPATIENT
Start: 2020-01-24 | End: 2020-03-11 | Stop reason: SDUPTHER

## 2020-01-24 RX ORDER — GABAPENTIN 300 MG/1
CAPSULE ORAL
COMMUNITY
Start: 2017-05-24 | End: 2020-01-24

## 2020-01-24 RX ORDER — TOPIRAMATE 100 MG/1
TABLET, FILM COATED ORAL
COMMUNITY
Start: 2017-06-08 | End: 2020-01-24

## 2020-01-24 RX ORDER — METHYLPREDNISOLONE 4 MG/1
TABLET ORAL
COMMUNITY
End: 2020-01-24

## 2020-01-24 NOTE — PROGRESS NOTES
Review of Systems   Constitutional: Positive for appetite change  Negative for fever  HENT: Negative  Negative for hearing loss, tinnitus, trouble swallowing and voice change  Eyes: Positive for visual disturbance (Sensitivity to light and noise)  Negative for photophobia and pain  Respiratory: Negative  Negative for shortness of breath  Cardiovascular: Negative  Negative for palpitations  Gastrointestinal: Negative  Negative for nausea and vomiting  Endocrine: Negative  Negative for cold intolerance and heat intolerance  Genitourinary: Negative  Negative for dysuria, frequency and urgency  Musculoskeletal: Negative  Negative for myalgias and neck pain  Skin: Negative  Negative for rash  Allergic/Immunologic: Negative  Neurological: Positive for headaches  Negative for dizziness, tremors, seizures, syncope, facial asymmetry, speech difficulty, weakness, light-headedness and numbness  Hematological: Negative  Does not bruise/bleed easily  Psychiatric/Behavioral: Positive for sleep disturbance  Negative for confusion and hallucinations

## 2020-01-29 LAB
ALBUMIN SERPL-MCNC: 4 G/DL (ref 3.8–4.9)
ALBUMIN/GLOB SERPL: 1.7 {RATIO} (ref 1.2–2.2)
ALP SERPL-CCNC: 108 IU/L (ref 39–117)
ALT SERPL-CCNC: 31 IU/L (ref 0–32)
AST SERPL-CCNC: 24 IU/L (ref 0–40)
BILIRUB SERPL-MCNC: <0.2 MG/DL (ref 0–1.2)
BUN SERPL-MCNC: 14 MG/DL (ref 6–24)
BUN/CREAT SERPL: 25 (ref 9–23)
CALCIUM SERPL-MCNC: 9.1 MG/DL (ref 8.7–10.2)
CHLORIDE SERPL-SCNC: 107 MMOL/L (ref 96–106)
CO2 SERPL-SCNC: 25 MMOL/L (ref 20–29)
CREAT SERPL-MCNC: 0.57 MG/DL (ref 0.57–1)
GLOBULIN SER-MCNC: 2.4 G/DL (ref 1.5–4.5)
GLUCOSE SERPL-MCNC: 91 MG/DL (ref 65–99)
POTASSIUM SERPL-SCNC: 4.4 MMOL/L (ref 3.5–5.2)
PROT SERPL-MCNC: 6.4 G/DL (ref 6–8.5)
SL AMB EGFR AFRICAN AMERICAN: 120 ML/MIN/1.73
SL AMB EGFR NON AFRICAN AMERICAN: 104 ML/MIN/1.73
SODIUM SERPL-SCNC: 147 MMOL/L (ref 134–144)
VIT B12 SERPL-MCNC: 260 PG/ML (ref 232–1245)

## 2020-01-31 DIAGNOSIS — G50.1 ATYPICAL FACIAL PAIN: ICD-10-CM

## 2020-01-31 DIAGNOSIS — G44.021 INTRACTABLE CHRONIC CLUSTER HEADACHE: ICD-10-CM

## 2020-01-31 RX ORDER — PREGABALIN 75 MG/1
75 CAPSULE ORAL 2 TIMES DAILY
Qty: 60 CAPSULE | Refills: 2 | Status: SHIPPED | OUTPATIENT
Start: 2020-01-31 | End: 2020-03-11 | Stop reason: SDUPTHER

## 2020-02-07 ENCOUNTER — HOSPITAL ENCOUNTER (OUTPATIENT)
Dept: MRI IMAGING | Facility: HOSPITAL | Age: 55
Discharge: HOME | End: 2020-02-07
Attending: PSYCHIATRY & NEUROLOGY
Payer: COMMERCIAL

## 2020-02-07 DIAGNOSIS — G44.029 CHRONIC CLUSTER HEADACHE, NOT INTRACTABLE: ICD-10-CM

## 2020-02-07 DIAGNOSIS — H57.02 ANISOCORIA: ICD-10-CM

## 2020-02-07 PROCEDURE — A9585 GADOBUTROL INJECTION: HCPCS | Performed by: PSYCHIATRY & NEUROLOGY

## 2020-02-07 PROCEDURE — 70553 MRI BRAIN STEM W/O & W/DYE: CPT

## 2020-02-07 RX ADMIN — GADOBUTROL 11 ML: 604.72 INJECTION INTRAVENOUS at 17:13

## 2020-02-15 DIAGNOSIS — G44.029 CHRONIC CLUSTER HEADACHE, NOT INTRACTABLE: ICD-10-CM

## 2020-02-17 RX ORDER — AMITRIPTYLINE HYDROCHLORIDE 10 MG/1
TABLET, FILM COATED ORAL
Qty: 42 TABLET | Refills: 0 | OUTPATIENT
Start: 2020-02-17

## 2020-02-19 ENCOUNTER — TELEPHONE (OUTPATIENT)
Dept: INTERNAL MEDICINE CLINIC | Facility: CLINIC | Age: 55
End: 2020-02-19

## 2020-02-19 ENCOUNTER — TELEPHONE (OUTPATIENT)
Dept: ADMINISTRATIVE | Facility: OTHER | Age: 55
End: 2020-02-19

## 2020-02-19 DIAGNOSIS — Z72.0 TOBACCO ABUSE: ICD-10-CM

## 2020-02-19 RX ORDER — VARENICLINE TARTRATE 1 MG/1
1 TABLET, FILM COATED ORAL 2 TIMES DAILY
Qty: 60 TABLET | Refills: 2 | Status: CANCELLED | OUTPATIENT
Start: 2020-02-19

## 2020-02-19 NOTE — TELEPHONE ENCOUNTER
Upon review of the In Basket request we were able to locate, review, and update the patient chart as requested for Mammogram     Any additional questions or concerns should be emailed to the Practice Liaisons via [email or web address] email, please do not reply via In Basket      Thank you  Montana Martinez MA

## 2020-02-19 NOTE — TELEPHONE ENCOUNTER
----- Message from Dianna Mccormick sent at 2/19/2020  1:00 PM EST -----  Teresa Abebe  Contact: [phone]  02/19/20 1:01 PM    Hello, our patient Isabella Trejo has had Mammogram completed/performed  Please assist in updating the patient chart by pulling the Care Everywhere (CE) document  The date of service is 1/28/20       Thank you,  Dianna Mccormick  [facility] [place]

## 2020-02-19 NOTE — TELEPHONE ENCOUNTER
Patient is requesting chantix 1 mg tablet, take 1 tablet by mouth 2 times a day  She is on her 5th day of not smoking  Was not sure how long she should be on this        [facility]arm[facility]    Call back #[phone]

## 2020-02-20 DIAGNOSIS — Z72.0 TOBACCO ABUSE: ICD-10-CM

## 2020-02-20 RX ORDER — VARENICLINE TARTRATE 1 MG/1
1 TABLET, FILM COATED ORAL 2 TIMES DAILY
Qty: 60 TABLET | Refills: 1 | Status: SHIPPED | OUTPATIENT
Start: 2020-02-20 | End: 2020-03-13

## 2020-03-10 ENCOUNTER — ANNUAL EXAM (OUTPATIENT)
Dept: OBGYN CLINIC | Facility: CLINIC | Age: 55
End: 2020-03-10
Payer: COMMERCIAL

## 2020-03-10 VITALS
BODY MASS INDEX: 36.82 KG/M2 | HEIGHT: 71 IN | WEIGHT: 263 LBS | SYSTOLIC BLOOD PRESSURE: 124 MMHG | DIASTOLIC BLOOD PRESSURE: 76 MMHG

## 2020-03-10 DIAGNOSIS — Z01.419 ENCOUNTER FOR GYNECOLOGICAL EXAMINATION (GENERAL) (ROUTINE) WITHOUT ABNORMAL FINDINGS: Primary | ICD-10-CM

## 2020-03-10 DIAGNOSIS — G44.029 CHRONIC CLUSTER HEADACHE, NOT INTRACTABLE: ICD-10-CM

## 2020-03-10 DIAGNOSIS — G44.021 INTRACTABLE CHRONIC CLUSTER HEADACHE: ICD-10-CM

## 2020-03-10 DIAGNOSIS — G50.1 ATYPICAL FACIAL PAIN: ICD-10-CM

## 2020-03-10 DIAGNOSIS — Z12.31 ENCOUNTER FOR SCREENING MAMMOGRAM FOR MALIGNANT NEOPLASM OF BREAST: ICD-10-CM

## 2020-03-10 PROCEDURE — 87624 HPV HI-RISK TYP POOLED RSLT: CPT | Performed by: NURSE PRACTITIONER

## 2020-03-10 PROCEDURE — G0145 SCR C/V CYTO,THINLAYER,RESCR: HCPCS | Performed by: NURSE PRACTITIONER

## 2020-03-10 PROCEDURE — 99396 PREV VISIT EST AGE 40-64: CPT | Performed by: NURSE PRACTITIONER

## 2020-03-10 NOTE — TELEPHONE ENCOUNTER
I would not change 2 things at once  Recommend seeing how she does on the lower dose of Lyrica 50 mg b i d  For 1 month And continuing verapamil at the same dose for now  Or if she would rather decrease verapamil 1st she can decrease this to 200 mg at bedtime and keep lyrica the same   Let me know

## 2020-03-10 NOTE — TELEPHONE ENCOUNTER
Patient requesting to decrease her lyrica and verapamil, stated she likes to lower the doses during the summer  Currently taking:  lyrica 75 mg BID  Verapamil 240 at bedtime    Patient also reports she never stopped taking the amitriptyline, and would like to increase this  She stated she didn't stop taking it because she wasn't sleeping well on lower dose  Currently taking 25 mg nightly of amitriptyline  [facility]  Patient requesting a call back, okay to leave message     [phone]

## 2020-03-10 NOTE — PATIENT INSTRUCTIONS
Calcium/Vitamin D Supplement (By mouth)   Calcium (SHANIQUE-see-um), Vitamin D (VYE-ta-min D)  Supplies your body with calcium if you need more than you get in your diet  Calcium helps prevent osteoporosis (weak or brittle bones)  Vitamin D helps your body use the calcium  Calcium and vitamin D are minerals that your body needs to work properly  Brand Name(s): Lev-Citrate, Lev-Citrate Plus Vitamin D, Calcet Petites, Calcium 600MG+D, Calcium Citrate +D3 Maximum, Caltrate 600 + D, Citracal + D, Citracal Calcium Citrate Petites with Vitamin D, Citracal Petites, Citracal Ultradense Calcium Citrate, Citracal Ultradense Calcium Citrate Petite w/Vit D, Citrus Calcium with Vitamin D, D-1000, D-2000, D3-400IU   There may be other brand names for this medicine  When This Medicine Should Not Be Used: You should not use this medicine if you have had an allergic reaction to calcium or vitamin D (ergocalciferol)  How to Use This Medicine:   Tablet, Long Acting Tablet, Fizzy Tablet, Liquid Filled Capsule, Chewable Tablet  · Your doctor will tell you how much medicine to use  Do not use more than directed  · Follow the instructions on the medicine label if you are using this medicine without a prescription  Ask your pharmacist or health caregiver if you are not sure how much calcium you should take in one day  · Most calcium supplements should be taken with food, but some kinds of calcium (such as calcium citrate) can be taken with or without food  Ask your health care provider or read the label on the bottle to see if you need to take your specific kind of calcium with food  Drink a full glass of water (8 ounces) with each dose  · If you are using the effervescent (fizzy) tablet, dissolve the tablet in about 6 to 8 ounces of water (3/4 cup to 1 cup)  After the tablet is completely dissolved, drink this mixture right away  Do not save any mixture to take later    · Carefully follow your doctor's instructions about any special diet   · If you need to take more than one dose each a day, take each dose at evenly spaced times, unless your doctor has told you otherwise  If a dose is missed:   · Take a dose as soon as you remember  If it is almost time for your next dose, wait until then and take a regular dose  Do not take extra medicine to make up for a missed dose  How to Store and Dispose of This Medicine:   · Store the medicine in a closed container at room temperature, away from heat, moisture, and direct light  If the effervescent (fizzy) tablet comes packaged in foil, do not open the foil until you are ready to use each tablet  · Ask your pharmacist, doctor, or health caregiver about the best way to dispose of any outdated medicine or medicine no longer needed  · Keep all medicine out of the reach of children  Never share your medicine with anyone  Drugs and Foods to Avoid:   Ask your doctor or pharmacist before using any other medicine, including over-the-counter medicines, vitamins, and herbal products  · Make sure your doctor knows if you are also using other supplements or medicines that contain calcium  Tell your doctor if you are also using gallium nitrate (Ganite®), cellulose sodium phosphate (Calcibind®), or etidronate (Didronel®)  · Calcium can change the way other medicines work if you take them at the same time  If you need to use other medicines, take them at least 2 hours before or 2 hours after you take your calcium supplement  This is particularly important if you are also using phenytoin (Dilantin®) or a tetracycline antibiotic to treat an infection (such as doxycycline, minocycline, Vibramycin®)  · Do not take your calcium supplement with a high-fiber meal (such as bran, whole-grain cereal or bread, fresh fruits)  Do not smoke cigarettes or cigars  Do not drink large amounts of alcohol or caffeine (for example, more than about 8 cups of coffee)    Warnings While Using This Medicine:   · Make sure your doctor knows if you are pregnant or breast feeding, or if you have kidney disease or have ever had kidney stones  Tell your doctor if you have had problems with too much calcium (hypercalcemia) or too little calcium in your blood (hypocalcemia)  Some health problems that can cause hypercalcemia are sarcoidosis, or problems with your parathyroid gland  · You should not use certain brands of this medicine if you have kidney disease or are on dialysis, because they may harm your kidneys  Ask your caregiver what brands are best for you  · Some health problems can affect how much calcium you should take  Tell your doctor if you have stomach or digestion problems, such as on-going diarrhea, not absorbing nutrients properly, or not having enough acid in your stomach  · This medicine might contain phenylalanine (aspartame)  This is only a concern if you have a disorder called phenylketonuria (a problem with amino acids)  If you have this condition, talk to your doctor before using this medicine  · If you are using a large amount of calcium or using it for a long time, your doctor might need to check your blood on a regular basis  Be sure to keep all appointments  Possible Side Effects While Using This Medicine:   Call your doctor right away if you notice any of these side effects:  · Headache that will not go away, dry mouth, loss of appetite, severe constipation  If you notice other side effects that you think are caused by this medicine, tell your doctor  Call your doctor for medical advice about side effects  You may report side effects to FDA at 4-860-FDA-1222  © 2017 [facility] Information is for End User's use only and may not be sold, redistributed or otherwise used for commercial purposes  The above information is an  only  It is not intended as medical advice for individual conditions or treatments   Talk to your doctor, nurse or pharmacist before following any medical regimen to see if it is safe and effective for you

## 2020-03-10 NOTE — PROGRESS NOTES
Assessment/Plan:    No problem-specific Assessment & Plan notes found for this encounter  Diagnoses and all orders for this visit:    Encounter for screening mammogram for malignant neoplasm of breast  -     Mammo screening bilateral w 3d & cad; Future        Call as needed, encouraged calcium/vit D supplementation, all questions answered    Subjective:      Patient ID: Sohail Newell is a 62 y o  female  Pleasant 62 y o  postmenopausal female here for annual exam  She denies postmenopausal bleeding  Denies history of abnormal pap smears  Denies vaginal issues  Denies pelvic pain  Denies menopausal/postmenopausal issues  + ex-smoker, quit 3 wks ago, PREFERS [facility] PAPS  Sister with breast cancer at 38 yo  Has to reschedule appt for colonoscopy- last attempt was unsuccessful d/t improper clean out  Gynecologic Exam   Pertinent negatives include no chills, constipation, diarrhea, dysuria, fever or hematuria  The following portions of the patient's history were reviewed and updated as appropriate:   She  has a past medical history of Asymptomatic postmenopausal status, Back pain, lumbosacral, Cellulitis of left lower extremity, Cluster headaches, Colon polyps, Dense breasts, Foot pain, left, Head injury, Hip pain, right, Lumbosacral pain, Lymph edema, Right hip pain, Spider veins, Tendinitis of right shoulder, and Varicose veins of legs  She does not have any pertinent problems on file  She  has a past surgical history that includes Back surgery; Vein ligation and stripping (Left); pr revise median n/carpal tunnel surg (Right, 9/12/2016); Tubal ligation; and Breast biopsy (Left)  Her family history includes Breast cancer in her maternal aunt and sister; Cancer in her sister; Cervical cancer in her maternal grandmother; Heart disease in her father; Migraines in her daughter; Other in her maternal grandmother; Sudden death in her mother; Varicose Veins in her father    She  reports that she has quit smoking  She smoked 1 00 pack per day  She has never used smokeless tobacco  She reports that she drank alcohol  She reports that she does not use drugs  Current Outpatient Medications   Medication Sig Dispense Refill    amitriptyline (ELAVIL) 10 mg tablet Decreased to 20 mg nightly for 2 weeks and then 10 mg nightly for 2 weeks and then can stop 42 tablet 0    eletriptan (RELPAX) 40 MG tablet Take 1 tablet at onset of migraine, May repeat once after 2 hours  Max 2 doses per 24 hours  BRAND NAME 36 tablet 0    HYDROcodone-acetaminophen (NORCO) 5-325 mg per tablet Take 1 tablet by mouth every 6 (six) hours as needed        meloxicam (MOBIC) 15 mg tablet as needed       pregabalin (LYRICA) 75 mg capsule Take 1 capsule (75 mg total) by mouth 2 (two) times a day 60 capsule 2    varenicline (CHANTIX) 1 mg tablet Take 1 tablet (1 mg total) by mouth 2 (two) times a day 60 tablet 1    verapamil (VERELAN) 240 MG 24 hr capsule Take 1 capsule (240 mg total) by mouth daily at bedtime 30 capsule 3    LORazepam (ATIVAN) 1 mg tablet Take 1 mg 1 hour prior to MRI, may repeat x1 in 40 minutes p r n  Claustrophobia/anxiety  No driving  (Patient not taking: Reported on 3/10/2020) 2 tablet 0    methylPREDNISolone 4 MG tablet therapy pack Use as directed on package (Patient not taking: Reported on 1/24/2020) 1 each 1     No current facility-administered medications for this visit  Current Outpatient Medications on File Prior to Visit   Medication Sig    amitriptyline (ELAVIL) 10 mg tablet Decreased to 20 mg nightly for 2 weeks and then 10 mg nightly for 2 weeks and then can stop    eletriptan (RELPAX) 40 MG tablet Take 1 tablet at onset of migraine, May repeat once after 2 hours  Max 2 doses per 24 hours   BRAND NAME    HYDROcodone-acetaminophen (NORCO) 5-325 mg per tablet Take 1 tablet by mouth every 6 (six) hours as needed      meloxicam (MOBIC) 15 mg tablet as needed     pregabalin (LYRICA) 75 mg capsule Take 1 capsule (75 mg total) by mouth 2 (two) times a day    varenicline (CHANTIX) 1 mg tablet Take 1 tablet (1 mg total) by mouth 2 (two) times a day    verapamil (VERELAN) 240 MG 24 hr capsule Take 1 capsule (240 mg total) by mouth daily at bedtime    LORazepam (ATIVAN) 1 mg tablet Take 1 mg 1 hour prior to MRI, may repeat x1 in 40 minutes p r n  Claustrophobia/anxiety  No driving  (Patient not taking: Reported on 3/10/2020)    methylPREDNISolone 4 MG tablet therapy pack Use as directed on package (Patient not taking: Reported on 2020)     No current facility-administered medications on file prior to visit  She has No Known Allergies     OB History    Para Term  AB Living   2 2 2     2   SAB TAB Ectopic Multiple Live Births           2      # Outcome Date GA Lbr Samuel/2nd Weight Sex Delivery Anes PTL Lv   2 Term            1 Term              Works in [employer] part time  2 grown son and daughter, no grands yet  Getting a hip replacement soon    Review of Systems   Constitutional: Negative for chills, fatigue, fever and unexpected weight change  Respiratory: Negative for shortness of breath  Gastrointestinal: Negative for anal bleeding, blood in stool, constipation and diarrhea  Genitourinary: Negative for difficulty urinating, dysuria and hematuria  Objective:      /76 (BP Location: Left arm, Patient Position: Sitting, Cuff Size: Standard)   Ht 5' 11" (1 803 m)   Wt 119 kg (263 lb)   LMP 2015   BMI 36 68 kg/m²          Physical Exam   Constitutional: She appears well-developed and well-nourished  No distress  HENT:   Head: Normocephalic  Neck: Normal range of motion  Neck supple  Pulmonary/Chest: Effort normal  Right breast exhibits no inverted nipple, no mass, no nipple discharge, no skin change and no tenderness  Left breast exhibits no inverted nipple, no mass, no nipple discharge, no skin change and no tenderness   No breast swelling, tenderness, discharge or bleeding  Breasts are symmetrical    Abdominal: Soft  Genitourinary: Pelvic exam was performed with patient supine  No labial fusion  There is no rash, tenderness, lesion or injury on the right labia  There is no rash, tenderness, lesion or injury on the left labia  Uterus is not deviated, not enlarged, not fixed and not tender  Cervix exhibits no motion tenderness, no discharge and no friability  Right adnexum displays no mass, no tenderness and no fullness  Left adnexum displays no mass, no tenderness and no fullness  No erythema or tenderness in the vagina  No foreign body in the vagina  No signs of injury around the vagina  No vaginal discharge found       BILATERAL LYMPHEDEMA CHRONIC, LIMITED EXAM D/T OBESITY

## 2020-03-11 RX ORDER — VERAPAMIL HYDROCHLORIDE 240 MG/1
240 CAPSULE, EXTENDED RELEASE ORAL
Qty: 30 CAPSULE | Refills: 1 | Status: SHIPPED | OUTPATIENT
Start: 2020-03-11 | End: 2021-04-21 | Stop reason: ALTCHOICE

## 2020-03-11 RX ORDER — PREGABALIN 50 MG/1
50 CAPSULE ORAL 2 TIMES DAILY
Qty: 60 CAPSULE | Refills: 1 | Status: SHIPPED | OUTPATIENT
Start: 2020-03-11 | End: 2021-04-21 | Stop reason: ALTCHOICE

## 2020-03-11 RX ORDER — AMITRIPTYLINE HYDROCHLORIDE 25 MG/1
25 TABLET, FILM COATED ORAL
Qty: 90 TABLET | Refills: 3 | Status: SHIPPED | OUTPATIENT
Start: 2020-03-11 | End: 2020-05-29 | Stop reason: SDUPTHER

## 2020-03-11 NOTE — TELEPHONE ENCOUNTER
Detailed message left for patient informing her of prior  Requested a call back if further questions and to keep us updated on status

## 2020-03-11 NOTE — TELEPHONE ENCOUNTER
Patient calling back, again reviewed below message  She would like to start by decreasing her lyrica  Asking for new script to be sent to [facility] on file for 50mg caps  She also is requesting refill of verapamil as she will run out of medication soon  Would like both medications sent to [facility]      Also asking if she can increase her amitriptyline to 25mg HS? See below  Would need a new script sent to [facility]  Please review and sign scripts if agreeable to these changes

## 2020-03-13 DIAGNOSIS — Z72.0 TOBACCO ABUSE: ICD-10-CM

## 2020-03-13 RX ORDER — VARENICLINE TARTRATE 1 MG/1
TABLET, FILM COATED ORAL
Qty: 60 TABLET | Refills: 1 | Status: SHIPPED | OUTPATIENT
Start: 2020-03-13 | End: 2020-04-05

## 2020-03-15 LAB
LAB AP GYN PRIMARY INTERPRETATION: NORMAL
Lab: NORMAL

## 2020-04-05 DIAGNOSIS — Z72.0 TOBACCO ABUSE: ICD-10-CM

## 2020-04-05 RX ORDER — VARENICLINE TARTRATE 1 MG/1
TABLET, FILM COATED ORAL
Qty: 60 TABLET | Refills: 1 | Status: SHIPPED | OUTPATIENT
Start: 2020-04-05 | End: 2021-04-21 | Stop reason: ALTCHOICE

## 2020-04-08 ENCOUNTER — TELEPHONE (OUTPATIENT)
Dept: NEUROLOGY | Facility: CLINIC | Age: 55
End: 2020-04-08

## 2020-04-19 DIAGNOSIS — Z72.0 TOBACCO ABUSE: ICD-10-CM

## 2020-04-20 RX ORDER — VARENICLINE TARTRATE 1 MG/1
TABLET, FILM COATED ORAL
Qty: 180 TABLET | Refills: 1 | OUTPATIENT
Start: 2020-04-20

## 2020-04-21 DIAGNOSIS — Z72.0 TOBACCO ABUSE: ICD-10-CM

## 2020-04-21 RX ORDER — VARENICLINE TARTRATE 1 MG/1
TABLET, FILM COATED ORAL
Qty: 180 TABLET | Refills: 1 | OUTPATIENT
Start: 2020-04-21

## 2020-05-29 ENCOUNTER — OFFICE VISIT (OUTPATIENT)
Dept: NEUROLOGY | Facility: CLINIC | Age: 55
End: 2020-05-29
Payer: COMMERCIAL

## 2020-05-29 VITALS
SYSTOLIC BLOOD PRESSURE: 130 MMHG | WEIGHT: 265 LBS | BODY MASS INDEX: 37.1 KG/M2 | TEMPERATURE: 99.4 F | DIASTOLIC BLOOD PRESSURE: 78 MMHG | HEIGHT: 71 IN | HEART RATE: 100 BPM

## 2020-05-29 DIAGNOSIS — G44.029 CHRONIC CLUSTER HEADACHE, NOT INTRACTABLE: Primary | ICD-10-CM

## 2020-05-29 PROCEDURE — 99214 OFFICE O/P EST MOD 30 MIN: CPT | Performed by: PSYCHIATRY & NEUROLOGY

## 2020-05-29 PROCEDURE — 3008F BODY MASS INDEX DOCD: CPT | Performed by: PSYCHIATRY & NEUROLOGY

## 2020-05-29 PROCEDURE — 1036F TOBACCO NON-USER: CPT | Performed by: PSYCHIATRY & NEUROLOGY

## 2020-05-29 RX ORDER — AMITRIPTYLINE HYDROCHLORIDE 25 MG/1
50 TABLET, FILM COATED ORAL
Qty: 180 TABLET | Refills: 1 | Status: SHIPPED | OUTPATIENT
Start: 2020-05-29 | End: 2020-09-11 | Stop reason: SDUPTHER

## 2020-09-10 ENCOUNTER — TELEPHONE (OUTPATIENT)
Dept: NEUROLOGY | Facility: CLINIC | Age: 55
End: 2020-09-10

## 2020-09-10 NOTE — TELEPHONE ENCOUNTER
Pt called and states that she see Dr Jennifer Samuel for Cluster ESPINOSA  Cluster ESPINOSA started in Nov  She takes amitriptyline 25 mg 2 tabs at bedtime  States that she is not having any issue now but since Dr Jennifer Samuel is going on maternity leave  She wanted to know if you want her to take preventative med? States during office visit in May, Dr Jennifer Samuel mentioned another preventative meds but she is unsure the name of this med  Per office notes: -future options:  Could consider CGRP med   Pt states that she has upcoming appt w/ Dr Roosevelt Ascencio on 9/30/20  Pt wants to know if she should just schedule virtual visit w/ you instead?  I did made pt aware that Dr Roosevelt Ascencio is headache specialist                            [phone] ok to leave detailed

## 2020-09-11 ENCOUNTER — TELEMEDICINE (OUTPATIENT)
Dept: NEUROLOGY | Facility: CLINIC | Age: 55
End: 2020-09-11
Payer: COMMERCIAL

## 2020-09-11 DIAGNOSIS — G44.029 CHRONIC CLUSTER HEADACHE, NOT INTRACTABLE: Primary | ICD-10-CM

## 2020-09-11 PROCEDURE — 1036F TOBACCO NON-USER: CPT | Performed by: PSYCHIATRY & NEUROLOGY

## 2020-09-11 PROCEDURE — 99214 OFFICE O/P EST MOD 30 MIN: CPT | Performed by: PSYCHIATRY & NEUROLOGY

## 2020-09-11 RX ORDER — AMITRIPTYLINE HYDROCHLORIDE 25 MG/1
50 TABLET, FILM COATED ORAL
Qty: 180 TABLET | Refills: 1 | Status: SHIPPED | OUTPATIENT
Start: 2020-09-11 | End: 2021-10-18

## 2020-09-11 NOTE — PATIENT INSTRUCTIONS
Headache/migraine treatment:   Abortive medications (for immediate treatment of a headache): It is ok to take ibuprofen, acetaminophen or naproxen (Advil, Tylenol,  Aleve, Excedrin) if they help your headaches you should limit these to No more than 3 times a week to avoid medication overuse/rebound headaches       For your more moderate to severe migraines take this medication early   Eletriptan 40mg tabs - take one at the onset of headache  May repeat one time after 1-2 hours if pain has not resolved  Max 2 a day     Prescription preventive medications for headaches/migraines   (to take every day to help prevent headaches - not to take at the time of headache):  [x]? amitriptyline continue 50 mg nightly     *Typically these types of medications take time untill you see the benefit, although some may see improvement in days, often it may take weeks, especially if the medication is being titrated up to a beneficial level  Please contact us if there are any concerns or questions regarding the medication       Sleep/headache prevention:    - Melatonin - you may take 3 mg nightly for sleep  You should take this 1 hour prior to bedtime consistently every night for it to work  It works by gradually helping to adjust your sleep time over days to weeks, rather than immediately making you feel sleepy        Self-Monitoring:  [x]?  Headache calendar  Each day stefanie a number from 0-10 indicating if there was a headache and how bad it was   This can be used to monitor gradual improvement and is helpful to make medication adjustments  You can do this on paper or there is an SEAN for a smart phone called "Migraine e Diary"       Lifestyle Recommendations:  [x]?  SLEEP - Maintain a regular sleep schedule: Adults need at least 7-8 hours of uninterrupted a night   Maintain good sleep hygiene:  Going to bed and waking up at consistent times, avoiding excessive daytime naps, avoiding caffeinated beverages in the evening, avoid excessive stimulation in the evening and generally using bed primarily for sleeping   One hour before bedtime would recommend turning lights down lower, decreasing your activity (may read quietly, listen to music at a low volume)  When you get into bed, should eliminate all technology (no texting, emailing, playing with your phone, iPad or tablet in bed)  [x]? HYDRATION - Maintain good hydration   Drink  2L of fluid a day (4 typical small water bottles)  [x]?  DIET - Maintain good nutrition  In particular don't skip meals and try and eat healthy balanced meals regularly  [x]?  TRIGGERS - Look for other triggers and avoid them: Limit caffeine to 1-2 cups a day or less  Avoid dietary triggers that you have noticed bring on your headaches (this could include aged cheese, peanuts, MSG, aspartame and nitrates)  [x]?  EXERCISE - physical exercise as we all know is good for you in many ways, and not only is good for your heart, but also is beneficial for your mental health, cognitive health and  chronic pain/headaches  I would encourage at the least 5 days of physical exercise weekly for at least 30 minutes       Education and Follow-up  [x]? Please call with any questions or concerns  Of course if any new concerning symptoms go to the emergency department    [x]? Follow up with PA in 2-3 months  Or next available

## 2020-09-11 NOTE — PROGRESS NOTES
Virtual Regular Visit      Assessment/Plan:    Problem List Items Addressed This Visit        Nervous and Auditory    Cluster headaches - Primary    Relevant Medications    amitriptyline (ELAVIL) 25 mg tablet               Reason for visit is   Chief Complaint   Patient presents with    Virtual Regular Visit        Encounter provider Carlos Enrique Cristina MD    Provider located at [address] [place] [id number]      Recent Visits  Date Type Provider Dept   09/10/20 Telephone Galina Kraus RN [facility] [address] recent visits within past 7 days and meeting all other requirements     Today's Visits  Date Type Provider Dept   09/11/20 Telemedicine Carlos Enrique Cristina MD [facility] [address] today's visits and meeting all other requirements     Future Appointments  No visits were found meeting these conditions  Showing future appointments within next 150 days and meeting all other requirements        The patient was identified by name and date of birth  Carl Ruiz was informed that this is a telemedicine visit and that the visit is being conducted through telephone  It was my intent to perform this visit via video technology but the patient was not able to do a video connection so the visit was completed via audio telephone only  My office door was closed  No one else was in the room  She acknowledged consent and understanding of privacy and security of the video platform  The patient has agreed to participate and understands they can discontinue the visit at any time  Patient is aware this is a billable service  Subjective  Assessment/Plan:      Angela Walker is a delightful 55 y  o  female with a past medical history that includes cluster headaches, chronic venous insufficiency, lymphedema, chronic back pain on and off, hip arthritis, obesity referred here for evaluation of headache    She has followed with my neurology colleagues in the past  My initial visit 01/24/2020  Follow-up 05/29/2020, 9/11/2020     Chronic cluster headache, not intractable  Anisocoria  Patient reports a long history of chronic headaches since her 35s    She reports her headaches, in clusters typically daily for 3 months at a time and occur in the evenings  Isamar Osgood can last anywhere from 15 minutes up to an hour and may happen 3 times at night in 2 times before bed  Isamar Osgood tend to build until they reach maximal intensity and then resolve   She reports may start with right teeth pain and then become right frontal and right retro-orbital stabbing pain although the most recent episode was mid frontal sharp electrical   Pain is severe   She reports associated features include photophobia, photophobia, right eye lacrimation, nasal congestion    - as of 1/24/2020: 3 months at a time will have daily headaches daily  - As of 05/29/2020: no headaches since last time, weaned herself off of lyrica and verapamil and just on amitriptyline 50 mg nightly and doing well  Computer read of recent EKG showed likely incorrect abnormalities since she was told it was normal, just want her PCP to look over it to make sure this is the case while on amitriptyline  - As of 9/11/2020:  No headaches since last visit, accidentally stop taking amitriptyline 50 mg nightly for 1 week and will restart, refilled today    We discussed plan for next time she has a cluster headache cycle - plan to start emgality - she will call in right went cycle starts       We discussed most likely etiology of her headaches would be cluster headache although other trigeminal autonomic cephalalgia such as hemicrania continua is a possibility as well      Workup:  - Neurologic assessment reveals normal neurological exam, except for anisocoria with right pupil larger than left  -  MRI brain with without contrast 02/07/2020: No acute disease   Punctate pontine capillary telangiectasia    No orbital pathology      Preventative:  - we discussed headache hygiene and lifestyle factors that may improve headaches  -  amitriptyline 50 mg nightly  Discussed proper use, possible side effects and risks  -  during flare she has been on verapamil 240 mg which did not work until pregabalin 75 mg b i d  was added   - of note through other providers she is on daily meloxicam which is helped with her head chronic hip pain  - past/failed:  Carbamazepine, Depakote, topiramate, gabapentin, metoprolol, lyrica, verapamil   -future options:  start emgality - SubQ: 300 mg at the onset of the cluster period and then once monthly until the end of the cluster period        Abortive:  - discussed not taking over-the-counter or prescription pain medications more than 3 days per week to prevent medication overuse/rebound headache  - she reports Relpax previously prescribed by my neurology colleague works well  Discussed proper use, possible side effects and risks   -past:  Steroids helped in the past but lately has not helped, sumatriptan p o  Nasal helped for a little while and was too expensive, rizatriptan she believes was helpful but too expensive - typically the pain starts too fast and ends before she is able to take something            Patient instructions            Headache/migraine treatment:   Abortive medications (for immediate treatment of a headache): It is ok to take ibuprofen, acetaminophen or naproxen (Advil, Tylenol,  Aleve, Excedrin) if they help your headaches you should limit these to No more than 3 times a week to avoid medication overuse/rebound headaches       For your more moderate to severe migraines take this medication early   Eletriptan 40mg tabs - take one at the onset of headache  May repeat one time after 1-2 hours if pain has not resolved     Max 2 a day     Prescription preventive medications for headaches/migraines   (to take every day to help prevent headaches - not to take at the time of headache):  [x]? amitriptyline continue 50 mg nightly     *Typically these types of medications take time untill you see the benefit, although some may see improvement in days, often it may take weeks, especially if the medication is being titrated up to a beneficial level  Please contact us if there are any concerns or questions regarding the medication       Sleep/headache prevention:    - Melatonin - you may take 3 mg nightly for sleep  You should take this 1 hour prior to bedtime consistently every night for it to work  It works by gradually helping to adjust your sleep time over days to weeks, rather than immediately making you feel sleepy        Self-Monitoring:  [x]?  Headache calendar  Each day stefanie a number from 0-10 indicating if there was a headache and how bad it was   This can be used to monitor gradual improvement and is helpful to make medication adjustments  You can do this on paper or there is an SEAN for a smart phone called "Migraine e Diary"       Lifestyle Recommendations:  [x]?  SLEEP - Maintain a regular sleep schedule: Adults need at least 7-8 hours of uninterrupted a night  Maintain good sleep hygiene:  Going to bed and waking up at consistent times, avoiding excessive daytime naps, avoiding caffeinated beverages in the evening, avoid excessive stimulation in the evening and generally using bed primarily for sleeping   One hour before bedtime would recommend turning lights down lower, decreasing your activity (may read quietly, listen to music at a low volume)  When you get into bed, should eliminate all technology (no texting, emailing, playing with your phone, iPad or tablet in bed)  [x]? HYDRATION - Maintain good hydration   Drink  2L of fluid a day (4 typical small water bottles)  [x]?  DIET - Maintain good nutrition  In particular don't skip meals and try and eat healthy balanced meals regularly  [x]?  TRIGGERS - Look for other triggers and avoid them: Limit caffeine to 1-2 cups a day or less  Avoid dietary triggers that you have noticed bring on your headaches (this could include aged cheese, peanuts, MSG, aspartame and nitrates)  [x]?  EXERCISE - physical exercise as we all know is good for you in many ways, and not only is good for your heart, but also is beneficial for your mental health, cognitive health and  chronic pain/headaches  I would encourage at the least 5 days of physical exercise weekly for at least 30 minutes       Education and Follow-up  [x]? Please call with any questions or concerns  Of course if any new concerning symptoms go to the emergency department  [x]? Follow up with PA in 2-3 months  Or next available         CC: We had the pleasure of evaluating Keesha Walker in neurological consultation today  Tristan Rosen a 60 y  o    right handed female who presents today for evaluation of headaches       History obtained from patient as well as available medical record review    History of Present Illness:   Current medical illnesses include cluster headaches, chronic venous insufficiency, lymphedema, chronic back pain on and off, hip arthritis, obesity   - She has followed with my neurology colleague Dr Baldwin, please see EMR for details, last note 12/04/2019      Interval history as of 9/11/2020:  - just had hip surgery in 8/2020  - forgot to take amitriptyline 50 mg for past week - will restart  - no headaches since we last talked        Interval history as of 5/29/2020:   -  MRI brain with without contrast 02/07/2020: No acute disease   Punctate pontine capillary telangiectasia    No orbital pathology      Headaches -none      She called in 03/10/2020 wanted to decrease Lyrica on verapamil, Amitriptyline-attempted to wean off but patient feel better on 25 mg nightly      Lyrica - weaned herself off  Verapamil - weaned herself off  Amitriptyline - 50 mg and doing great, sleeping wonderful 10-8      relpax not needed     Headaches started at what age? Late 30s years old  How often do the headaches occur?   - as of 1/24/2020: 3 months at a time headaches daily, none in 6 weeks   - As of 05/29/2020: no headaches since last time, weaned herself off of lyrica and verapamil and just on amitriptyline 50 mg nightly and doing well   What time of the day do the headaches start? only at night,  most of the time during sleep or before she sleeps at night  How long do the headaches last? 15 mins to an hour - 3 times at night and 2 times before bed - worsens until it gets to a point where it blows up and then goes away   Are you ever headache free? Yes     Aura? without aura     Where is your headache located and pain quality?   - starts with right teeth pain and feels it coming   - usually right frontal and right retroorbital - stabbing  - midfrontal lately - sharp, electtical  What is the intensity of pain? Average: 15/10  Associated symptoms:   []? Nausea       []? Vomiting        []? Diarrhea  [x]? Insomnia    []? Stiff or sore neck   [x]?  Problems with concentration  [x]? Photophobia     [x]? Phonophobia      []? Osmophobia  []? Blurred vision   [x]?  Prefer quiet, dark room  []? Light-headed or dizzy     []? Tinnitus    []? Hands or feet tingle or feel numb/paresthesias       []? Red ear      []? Ptosis      []? Facial droop  [x]? Lacrimation - right tearing  [x]? Nasal congestion  []? Flushing of face  []? Change in pupil size     Number of days missed per month because of headaches:  Work (or school) days: 0     Things that make the headache worse? No specific movements, any movement      Headache triggers:  Unknown   What time of the year do headaches occur more frequently?   are usually worse winter but has also had in July     Have you seen someone else for headaches or pain? Yes, Dr Lili Stanley  Have you had trigger point injection performed and how often? No  Have you had Botox injection performed and how often? No   Have you had epidural injections or transforaminal injections performed? No  Are you current pregnant or planning on getting pregnant? No  Have you ever had any Brain imaging? Yes multiple normal      What medications do you take or have you taken for your headaches?    ABORTIVE:    OTC medications have been ineffective         Relpax - 12/month - if takes before bed able to sleep for 4 hours -      Has norco for back once a year when back goes out      Past:  Steroids helped in the past, but last couple bathes lasted a day only  Sumatriptan PO and nasal helped for a little   Rizatriptan - was helpful and too expensive      PREVENTIVE:      - amitriptyline 25 mg - been on for years - up to 50 in cycle   For hip pain: meloxicam      Past:  - Verapamil 240 mg daily (in past 240 +120)   - pregabalin 75 mg daily- started 1 week before being 6 weeks headache free    Past/failed:   carbamazepine - did not help  depakote did not help   topiramate 100 mg over 3 months   Gabapentin helped in the past and not recently  Metoprolol        LIFESTYLE  Sleep   - averages: normally sleeps well     Physical activity: none      Water: 0 per day  Caffeine: 8 cups per day  Diet:  overeating     Mood:   Denies history of anxiety or depression or other diagnosed mood disorder     The following portions of the patient's history were reviewed and updated as appropriate: allergies, current medications, past family history, past medical history, past social history, past surgical history and problem list      Pertinent family history:  Family history of headaches:  Migraines in daughter, sister and mother  Any family history of aneurysms - No     Pertinent social history:  Kathy Kendrick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  [address]  Lives with      Illicit Drugs: denies  Alcohol/tobacco: quiting tobacco - 5 cigs improvement, alcohol no       Past Medical History:   Diagnosis Date    Asymptomatic postmenopausal status     Back pain, lumbosacral     Cellulitis of left lower extremity     last assessed    3/30/16    resolved  Yuki Skipper   9/8/16      Cluster headaches     Colon polyps     Dense breasts     Foot pain, left     Head injury     Dec 2017 no LOC    Hip pain, right     Lumbosacral pain     Lymph edema     Right hip pain     Spider veins     Tendinitis of right shoulder     Varicose veins of legs        Past Surgical History:   Procedure Laterality Date    BACK SURGERY      type unknown- occurred during vaginal delivery    BREAST BIOPSY Left     HAND TENDON SURGERY      GA REVISE MEDIAN N/CARPAL TUNNEL SURG Right 9/12/2016    Procedure: WRIST OPEN CARPAL TUNNEL RELEASE ;  Surgeon: Rabia Amin MD;  [facility];  Service: Orthopedics    TUBAL LIGATION      VEIN LIGATION AND STRIPPING Left        Current Outpatient Medications   Medication Sig Dispense Refill    amitriptyline (ELAVIL) 25 mg tablet Take 2 tablets (50 mg total) by mouth daily at bedtime 180 tablet 1    CHANTIX 1 MG tablet TAKE 1 TABLET BY MOUTH TWICE A DAY (Patient not taking: Reported on 5/29/2020) 60 tablet 1    eletriptan (RELPAX) 40 MG tablet Take 1 tablet at onset of migraine, May repeat once after 2 hours  Max 2 doses per 24 hours  BRAND NAME (Patient not taking: Reported on 5/29/2020) 36 tablet 0    HYDROcodone-acetaminophen (NORCO) 5-325 mg per tablet Take 1 tablet by mouth every 6 (six) hours as needed        LORazepam (ATIVAN) 1 mg tablet Take 1 mg 1 hour prior to MRI, may repeat x1 in 40 minutes p r n  Claustrophobia/anxiety  No driving   (Patient not taking: Reported on 3/10/2020) 2 tablet 0    meloxicam (MOBIC) 15 mg tablet as needed       methylPREDNISolone 4 MG tablet therapy pack Use as directed on package (Patient not taking: Reported on 1/24/2020) 1 each 1    pregabalin (LYRICA) 50 mg capsule Take 1 capsule (50 mg total) by mouth 2 (two) times a day (Patient not taking: Reported on 5/29/2020) 60 capsule 1    verapamil (VERELAN) 240 MG 24 hr capsule Take 1 capsule (240 mg total) by mouth daily at bedtime (Patient not taking: Reported on 5/29/2020) 30 capsule 1     No current facility-administered medications for this visit  No Known Allergies      Objective:     Exam limited by Video       Pertinent lab results:   01/28/2020 CMP significant for sodium 147, B12 260    10/29/2019 CMP and CBC unremarkable, TSH normal     06/08/2016 B12 283     Imaging:   MRI of the brain around 4 years ago normal per patient      MRA head 01/21/2015:  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   No evidence of intracranial aneurysm, AVM, cortical cerebral arterial stenosis or occlusion  - follow up CT scan was normal, no cyst        Review of Systems:   ROS obtained by medical assistant Personally reviewed and updated if indicated  Review of Systems   Constitutional: Negative  Negative for appetite change and fever  HENT: Negative  Negative for hearing loss, tinnitus, trouble swallowing and voice change  Eyes: Negative  Negative for photophobia and pain  Respiratory: Negative  Negative for shortness of breath  Cardiovascular: Negative  Negative for palpitations  Gastrointestinal: Negative  Negative for nausea and vomiting  Endocrine: Negative  Negative for cold intolerance  Genitourinary: Negative  Negative for dysuria, frequency and urgency  Musculoskeletal: Negative  Negative for myalgias and neck pain  Skin: Negative  Negative for rash  Neurological: Negative  Negative for dizziness, tremors, seizures, syncope, facial asymmetry, speech difficulty, weakness, light-headedness, numbness and headaches  Hematological: Negative  Does not bruise/bleed easily  Psychiatric/Behavioral: Negative  Negative for confusion, hallucinations and sleep disturbance         I have spent about 25 minutes with Patient  today in which greater than 50% of this time was spent in counseling/coordination of care regarding Prognosis, Risks and benefits of tx options, Intructions for management, Patient education, Importance of tx compliance, Risk factor reductions and Impressions  VIRTUAL VISIT 180 Mesfin Coffey acknowledges that she has consented to an online visit or consultation  She understands that the online visit is based solely on information provided by her, and that, in the absence of a face-to-face physical evaluation by the physician, the diagnosis she receives is both limited and provisional in terms of accuracy and completeness  This is not intended to replace a full medical face-to-face evaluation by the physician  Bella Pham understands and accepts these terms

## 2020-11-06 ENCOUNTER — CLINICAL SUPPORT (OUTPATIENT)
Dept: INTERNAL MEDICINE CLINIC | Facility: CLINIC | Age: 55
End: 2020-11-06
Payer: COMMERCIAL

## 2020-11-06 DIAGNOSIS — Z23 NEED FOR INFLUENZA VACCINATION: Primary | ICD-10-CM

## 2020-11-06 PROCEDURE — 90682 RIV4 VACC RECOMBINANT DNA IM: CPT

## 2020-11-06 PROCEDURE — 90471 IMMUNIZATION ADMIN: CPT

## 2020-12-07 PROCEDURE — 88305 TISSUE EXAM BY PATHOLOGIST: CPT | Performed by: PATHOLOGY

## 2020-12-08 ENCOUNTER — LAB REQUISITION (OUTPATIENT)
Dept: LAB | Facility: HOSPITAL | Age: 55
End: 2020-12-08
Payer: COMMERCIAL

## 2020-12-08 DIAGNOSIS — K63.5 POLYP OF COLON: ICD-10-CM

## 2020-12-31 ENCOUNTER — TELEPHONE (OUTPATIENT)
Dept: NEUROLOGY | Facility: CLINIC | Age: 55
End: 2020-12-31

## 2020-12-31 DIAGNOSIS — G44.029 CHRONIC CLUSTER HEADACHE, NOT INTRACTABLE: Primary | ICD-10-CM

## 2020-12-31 RX ORDER — GALCANEZUMAB 100 MG/ML
300 INJECTION, SOLUTION SUBCUTANEOUS
Qty: 3 SYRINGE | Refills: 5 | Status: SHIPPED | OUTPATIENT
Start: 2020-12-31 | End: 2021-04-21

## 2020-12-31 NOTE — TELEPHONE ENCOUNTER
I called patient to get further information  A headache started last night  She thinks a cluster headache attack is coming  She notes it only hurts a little now but it usually stabbing pain on R side of head, nostril clogs up, tearing in R eye  Currently rates 3/10  Amitriptyline 25mg tabs, 2 tabs HS  She states she thought she was going to start an injection when the cycle starts  Per last office note "We discussed plan for next time she has a cluster headache cycle - plan to start emgality - she will call in right went cycle starts  "    She does have Eletriptan at home and will take this when she gets home  She is wondering if she could possibly start the emgality? She would like this to be sent to [facility] on file  Please send if agreeable  I did make her aware that this may require prior auth through her insurance  She verbalized understanding

## 2020-12-31 NOTE — TELEPHONE ENCOUNTER
Please let patient know I sent Emgality to local pharmacy  She is to use 3 injections monthly until the cluster headaches stop    Try to download a coupon card for copay as well

## 2020-12-31 NOTE — TELEPHONE ENCOUNTER
Patient called and Daniella Lund stating that she has had these headaches and they are not going away, inquiring as to whether Dr Harper Kiser left any notes about anything else she can do to help her headaches - please return call with any suggestions on relief - best number to call [phone] - pt has a followup with Dr Melissa Gonsalez on 1/28/2021

## 2021-02-12 ENCOUNTER — TELEPHONE (OUTPATIENT)
Dept: NEUROLOGY | Facility: CLINIC | Age: 56
End: 2021-02-12

## 2021-02-12 DIAGNOSIS — Z12.31 ENCOUNTER FOR SCREENING MAMMOGRAM FOR MALIGNANT NEOPLASM OF BREAST: ICD-10-CM

## 2021-02-12 NOTE — TELEPHONE ENCOUNTER
Called and spoke to patient -  patient confirmed upcoming apt with Dr Sabrina Bridges  Patient has no issues or concerns at this time

## 2021-02-26 ENCOUNTER — TELEMEDICINE (OUTPATIENT)
Dept: NEUROLOGY | Facility: CLINIC | Age: 56
End: 2021-02-26
Payer: COMMERCIAL

## 2021-02-26 ENCOUNTER — TELEPHONE (OUTPATIENT)
Dept: NEUROLOGY | Facility: CLINIC | Age: 56
End: 2021-02-26

## 2021-02-26 VITALS — BODY MASS INDEX: 36.4 KG/M2 | HEIGHT: 71 IN | WEIGHT: 260 LBS

## 2021-02-26 DIAGNOSIS — G44.029 CHRONIC CLUSTER HEADACHE, NOT INTRACTABLE: Primary | ICD-10-CM

## 2021-02-26 PROCEDURE — 99213 OFFICE O/P EST LOW 20 MIN: CPT | Performed by: PSYCHIATRY & NEUROLOGY

## 2021-02-26 NOTE — PROGRESS NOTES
Virtual Regular Visit      Assessment/Plan:    Problem List Items Addressed This Visit     None               Reason for visit is   Chief Complaint   Patient presents with    Virtual Regular Visit        Encounter provider River Waters MD    Provider located at [facility]  [address] [place] [id number]      Recent Visits  No visits were found meeting these conditions  Showing recent visits within past 7 days and meeting all other requirements     Today's Visits  Date Type Provider Dept   02/26/21 Telemedicine River Waters MD [facility] [address] today's visits and meeting all other requirements     Future Appointments  No visits were found meeting these conditions  Showing future appointments within next 150 days and meeting all other requirements        The patient was identified by name and date of birth  Sohail Newell was informed that this is a telemedicine visit and that the visit is being conducted through [employer] and patient was informed that this is a secure, HIPAA-compliant platform  She agrees to proceed     My office door was closed  No one else was in the room  She acknowledged consent and understanding of privacy and security of the video platform  The patient has agreed to participate and understands they can discontinue the visit at any time  Patient is aware this is a billable service  Subjective  Assessment/Plan:      Mari Walker is a delightful 55 y  o  female with a past medical history that includes cluster headaches, chronic venous insufficiency, lymphedema, chronic back pain on and off, hip arthritis, obesity referred here for evaluation of headache    She has followed with my neurology colleagues in the past  My initial visit 01/24/2020  Follow-up 05/29/2020, 9/11/2020     Chronic cluster headache, not intractable  Anisocoria  Patient reports a long history of chronic headaches since her 35s    She reports her headaches, in clusters typically daily for 3 months at a time and occur in the evenings  Linmaggi Kanaris can last anywhere from 15 minutes up to an hour and may happen 3 times at night in 2 times before bed  Linard Kanaris tend to build until they reach maximal intensity and then resolve   She reports may start with right teeth pain and then become right frontal and right retro-orbital stabbing pain although the most recent episode was mid frontal sharp electrical   Pain is severe   She reports associated features include photophobia, photophobia, right eye lacrimation, nasal congestion    - as of 1/24/2020: 3 months at a time will have daily headaches daily  - As of 05/29/2020: no headaches since last time, weaned herself off of lyrica and verapamil and just on amitriptyline 50 mg nightly and doing well  Computer read of recent EKG showed likely incorrect abnormalities since she was told it was normal, just want her PCP to look over it to make sure this is the case while on amitriptyline    - As of 9/11/2020:  No headaches since last visit, accidentally stop taking amitriptyline 50 mg nightly for 1 week and will restart, refilled today  We discussed plan for next time she has a cluster headache cycle - plan to start emgality - she will call in right went cycle starts     - as of 2/26/2021:  She has not had significant headache in the past year on amitriptyline 50 mg nightly      We discussed most likely etiology of her headaches would be cluster headache although other trigeminal autonomic cephalalgia such as hemicrania continua is a possibility as well      Workup:  - Neurologic assessment reveals normal neurological exam, except for anisocoria with right pupil larger than left  -  MRI brain with without contrast 02/07/2020: No acute disease   Punctate pontine capillary telangiectasia    No orbital pathology      Preventative:  - we discussed headache hygiene and lifestyle factors that may improve headaches  -  amitriptyline 50 mg nightly  Discussed proper use, possible side effects and risks  - of note through other providers she is on daily meloxicam which is helped with her head chronic hip pain  - past/failed:  Carbamazepine, Depakote, topiramate, gabapentin, metoprolol, lyrica, verapamil   -future options:  start emgality - SubQ: 300 mg at the onset of the cluster period and then once monthly until the end of the cluster period        Abortive:  - discussed not taking over-the-counter or prescription pain medications more than 3 days per week to prevent medication overuse/rebound headache  - she reports Relpax previously prescribed by my neurology colleague works well  Discussed proper use, possible side effects and risks   -past:  Steroids helped in the past but lately has not helped, sumatriptan p o  Nasal helped for a little while and was too expensive, rizatriptan she believes was helpful but too expensive - typically the pain starts too fast and ends before she is able to take something            Patient instructions            Headache/migraine treatment:   Abortive medications (for immediate treatment of a headache): It is ok to take ibuprofen, acetaminophen or naproxen (Advil, Tylenol,  Aleve, Excedrin) if they help your headaches you should limit these to No more than 3 times a week to avoid medication overuse/rebound headaches       For your more moderate to severe migraines take this medication early   Eletriptan 40mg tabs - take one at the onset of headache  May repeat one time after 1-2 hours if pain has not resolved  Max 2 a day     Prescription preventive medications for headaches/migraines   (to take every day to help prevent headaches - not to take at the time of headache):  [x]? ? amitriptyline continue 50 mg nightly     *Typically these types of medications take time untill you see the benefit, although some may see improvement in days, often it may take weeks, especially if the medication is being titrated up to a beneficial level  Please contact us if there are any concerns or questions regarding the medication       Sleep/headache prevention:    - Melatonin - you may take 3 mg nightly for sleep  You should take this 1 hour prior to bedtime consistently every night for it to work  It works by gradually helping to adjust your sleep time over days to weeks, rather than immediately making you feel sleepy        Self-Monitoring:  [x]? ? Headache calendar  Each day stefanie a number from 0-10 indicating if there was a headache and how bad it was   This can be used to monitor gradual improvement and is helpful to make medication adjustments  You can do this on paper or there is an SEAN for a smart phone called "Migraine e Diary"       Lifestyle Recommendations:  [x]? ? SLEEP - Maintain a regular sleep schedule: Adults need at least 7-8 hours of uninterrupted a night  Maintain good sleep hygiene:  Going to bed and waking up at consistent times, avoiding excessive daytime naps, avoiding caffeinated beverages in the evening, avoid excessive stimulation in the evening and generally using bed primarily for sleeping   One hour before bedtime would recommend turning lights down lower, decreasing your activity (may read quietly, listen to music at a low volume)  When you get into bed, should eliminate all technology (no texting, emailing, playing with your phone, iPad or tablet in bed)  [x]?? HYDRATION - Maintain good hydration   Drink  2L of fluid a day (4 typical small water bottles)  [x]? ? DIET - Maintain good nutrition  In particular don't skip meals and try and eat healthy balanced meals regularly  [x]? ? TRIGGERS - Look for other triggers and avoid them: Limit caffeine to 1-2 cups a day or less  Avoid dietary triggers that you have noticed bring on your headaches (this could include aged cheese, peanuts, MSG, aspartame and nitrates)  [x]? ? EXERCISE - physical exercise as we all know is good for you in many ways, and not only is good for your heart, but also is beneficial for your mental health, cognitive health and  chronic pain/headaches  I would encourage at the least 5 days of physical exercise weekly for at least 30 minutes       Education and Follow-up  [x]? ? Please call with any questions or concerns  Of course if any new concerning symptoms go to the emergency department  [x]? ? Follow up in 1 year, sooner if needed        CC: We had the pleasure of evaluating Keesha Walker in neurological consultation today  Aleyda De León a   right handed female who presents today for evaluation of headaches       History obtained from patient as well as available medical record review    History of Present Illness:    interval history as of 02/26/2021   - dx COVID last weds   - has not smoked in a year     She reports she has not had a significant headache or migraine in over a year   (she had these headaches about 10 years and thinks menopaused ended a few years ago, but we discussed they may have been hormonally related and maybe she is done with them now)  Preventative: Amitriptyline 50 mg nightly  Abortive:  Eletriptan     Interval history as of 9/11/2020:  - just had hip surgery in 8/2020  - forgot to take amitriptyline 50 mg for past week - will restart  - no headaches since we last talked         Interval history as of 5/29/2020:   -  MRI brain with without contrast 02/07/2020: No acute disease   Punctate pontine capillary telangiectasia    No orbital pathology      Headaches -none      She called in 03/10/2020 wanted to decrease Lyrica on verapamil, Amitriptyline-attempted to wean off but patient feel better on 25 mg nightly      Lyrica - weaned herself off  Verapamil - weaned herself off  Amitriptyline - 50 mg and doing great, sleeping wonderful 10-8      relpax not needed     Headaches started at what age? Late 24s years old  How often do the headaches occur?   - as of 1/24/2020: 3 months at a time headaches daily, none in 6 weeks   - As of 05/29/2020: no headaches since last time, weaned herself off of lyrica and verapamil and just on amitriptyline 50 mg nightly and doing well   What time of the day do the headaches start? only at night,  most of the time during sleep or before she sleeps at night  How long do the headaches last? 15 mins to an hour - 3 times at night and 2 times before bed - worsens until it gets to a point where it blows up and then goes away   Are you ever headache free? Yes     Aura? without aura     Where is your headache located and pain quality?   - starts with right teeth pain and feels it coming   - usually right frontal and right retroorbital - stabbing  - midfrontal lately - sharp, electtical  What is the intensity of pain? Average: 15/10  Associated symptoms:   []?? Nausea       []? ? Vomiting        []? ?Merribeth Alley  [x]? ? Insomnia    []? ? Stiff or sore neck   [x]? ? Problems with concentration  [x]? ? Photophobia     [x]? ?Phonophobia      []? ? Osmophobia  []? ? Blurred vision   [x]? ? Prefer quiet, dark room  []? ? Light-headed or dizzy     []? ? Tinnitus    []? ? Hands or feet tingle or feel numb/paresthesias       []? ? Red ear      []? ? Ptosis      []? ? Facial droop  [x]? ? Lacrimation - right tearing  [x]? ? Nasal congestion  []? ? Flushing of face  []? ? Change in pupil size     Number of days missed per month because of headaches:  Work (or school) days: 0     Things that make the headache worse? No specific movements, any movement      Headache triggers:  Unknown   What time of the year do headaches occur more frequently?   are usually worse winter but has also had in July     Have you seen someone else for headaches or pain? Yes, Dr Nino Halsted  Have you had trigger point injection performed and how often? No  Have you had Botox injection performed and how often? No   Have you had epidural injections or transforaminal injections performed? No  Are you current pregnant or planning on getting pregnant? No   Have you ever had any Brain imaging? Yes multiple normal      What medications do you take or have you taken for your headaches? ABORTIVE:    OTC medications have been ineffective         Relpax - 12/month - if takes before bed able to sleep for 4 hours -      Has norco for back once a year when back goes out      Past:  Steroids helped in the past, but last couple bathes lasted a day only  Sumatriptan PO and nasal helped for a little   Rizatriptan - was helpful and too expensive      PREVENTIVE:      - amitriptyline 25 mg - been on for years - up to 50 in cycle   For hip pain: meloxicam      Past:  - Verapamil 240 mg daily (in past 240 +120)   - pregabalin 75 mg daily- started 1 week before being 6 weeks headache free     Past/failed:   carbamazepine - did not help  depakote did not help   topiramate 100 mg over 3 months   Gabapentin helped in the past and not recently  Metoprolol        LIFESTYLE  Sleep   - averages: normally sleeps well     Physical activity: none      Water: 0 per day  Caffeine: 8 cups per day  Diet:  overeating     Mood:   Denies history of anxiety or depression or other diagnosed mood disorder     The following portions of the patient's history were reviewed and updated as appropriate: allergies, current medications, past family history, past medical history, past social history, past surgical history and problem list      Pertinent family history:  Family history of headaches:  Migraines in daughter, sister and mother  Any family history of aneurysms - No     Pertinent social history:  Tiffani Fears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  [address]  Lives with      Illicit Drugs: denies  Alcohol/tobacco: quiting tobacco - 5 cigs improvement, alcohol no       Past Medical History:   Diagnosis Date    Asymptomatic postmenopausal status     Back pain, lumbosacral     Cellulitis of left lower extremity     last assessed    3/30/16 resolved  Angela [place]   9/8/16      Cluster headaches     Colon polyps     Dense breasts     Foot pain, left     Head injury     Dec 2017 no LOC    Hip pain, right     Lumbosacral pain     Lymph edema     Right hip pain     Spider veins     Tendinitis of right shoulder     Varicose veins of legs        Past Surgical History:   Procedure Laterality Date    BACK SURGERY      type unknown- occurred during vaginal delivery    BREAST BIOPSY Left     HAND TENDON SURGERY      MD REVISE MEDIAN N/CARPAL TUNNEL SURG Right 9/12/2016    Procedure: WRIST OPEN CARPAL TUNNEL RELEASE ;  Surgeon: Daja Alvarez MD;  [facility];  Service: Orthopedics    TUBAL LIGATION      VEIN LIGATION AND STRIPPING Left        Current Outpatient Medications   Medication Sig Dispense Refill    amitriptyline (ELAVIL) 25 mg tablet Take 2 tablets (50 mg total) by mouth daily at bedtime 180 tablet 1    HYDROcodone-acetaminophen (NORCO) 5-325 mg per tablet Take 1 tablet by mouth every 6 (six) hours as needed        CHANTIX 1 MG tablet TAKE 1 TABLET BY MOUTH TWICE A DAY (Patient not taking: Reported on 5/29/2020) 60 tablet 1    eletriptan (RELPAX) 40 MG tablet Take 1 tablet at onset of migraine, May repeat once after 2 hours  Max 2 doses per 24 hours  BRAND NAME (Patient not taking: Reported on 5/29/2020) 36 tablet 0    Galcanezumab-gnlm (Emgality, 300 MG Dose,) 100 MG/ML SOSY Inject 3 mL (300 mg total) under the skin every 30 (thirty) days (Patient not taking: Reported on 2/26/2021) 3 Syringe 5    LORazepam (ATIVAN) 1 mg tablet Take 1 mg 1 hour prior to MRI, may repeat x1 in 40 minutes p r n  Claustrophobia/anxiety  No driving   (Patient not taking: Reported on 3/10/2020) 2 tablet 0    meloxicam (MOBIC) 15 mg tablet as needed       methylPREDNISolone 4 MG tablet therapy pack Use as directed on package (Patient not taking: Reported on 1/24/2020) 1 each 1    pregabalin (LYRICA) 50 mg capsule Take 1 capsule (50 mg total) by mouth 2 (two) times a day (Patient not taking: Reported on 5/29/2020) 60 capsule 1    verapamil (VERELAN) 240 MG 24 hr capsule Take 1 capsule (240 mg total) by mouth daily at bedtime (Patient not taking: Reported on 5/29/2020) 30 capsule 1     No current facility-administered medications for this visit  No Known Allergies      Objective:     Exam limited by Video  Physical Exam:                                                                 Vitals:            Constitutional:    Ht 5' 11" (1 803 m)   Wt 118 kg (260 lb)   LMP 01/01/2015   BMI 36 26 kg/m²   BP Readings from Last 3 Encounters:   05/29/20 130/78   03/10/20 124/76   01/24/20 130/60     Pulse Readings from Last 3 Encounters:   05/29/20 100   01/24/20 60   12/04/19 89         Well developed, well nourished, No dysmorphic features  HEENT:  Normocephalic atraumatic  See neuro exam   Psychiatric:  Normal behavior and appropriate affect        Neurological Examination:     Mental status/cognitive function:   Recent and remote memory appear intact  Attention span and concentration as well as fund of knowledge appear appropriate for age  Normal language and spontaneous speech  Cranial Nerves:  III, IV, VI-Pupils were equal, round  Extraocular movements appear full and conjugate   VII-facial expression symmetric  Motor Exam: symmetric bulk throughout  no atrophy, fasciculations or abnormal movements noted     Coordination:  no apparent dysmetria, ataxia or tremor noted        Pertinent lab results:   01/28/2020 CMP significant for sodium 147, B12 260    10/29/2019 CMP and CBC unremarkable, TSH normal     06/08/2016 B12 283     Imaging:   MRI of the brain around 4 years ago normal per patient      MRA head 01/21/2015:  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   No evidence of intracranial aneurysm, AVM, cortical cerebral arterial stenosis or occlusion  - follow up CT scan was normal, no cyst   Review of Systems:   ROS obtained by medical assistant Personally reviewed and updated if indicated  Review of Systems   Constitutional: Negative  Negative for appetite change and fever  HENT: Negative  Negative for hearing loss, tinnitus, trouble swallowing and voice change  Eyes: Negative  Negative for photophobia and pain  Respiratory: Negative  Negative for shortness of breath  Cardiovascular: Negative  Negative for palpitations  Gastrointestinal: Negative  Negative for nausea and vomiting  Endocrine: Negative  Negative for cold intolerance  Genitourinary: Negative  Negative for dysuria, frequency and urgency  Musculoskeletal: Negative  Negative for myalgias and neck pain  Skin: Negative  Negative for rash  Neurological: Negative  Negative for dizziness, tremors, seizures, syncope, facial asymmetry, speech difficulty, weakness, light-headedness, numbness and headaches  Hematological: Negative  Does not bruise/bleed easily  Psychiatric/Behavioral: Negative  Negative for confusion, hallucinations and sleep disturbance  I have spent 12 minutes with Patient today in which greater than 50% of this time was spent in counseling/coordination of care  I also spent 10 minutes non face to face for this patient the same day  VIRTUAL VISIT Ren Coffey acknowledges that she has consented to an online visit or consultation  She understands that the online visit is based solely on information provided by her, and that, in the absence of a face-to-face physical evaluation by the physician, the diagnosis she receives is both limited and provisional in terms of accuracy and completeness  This is not intended to replace a full medical face-to-face evaluation by the physician  Enedina Cowden understands and accepts these terms

## 2021-02-26 NOTE — PATIENT INSTRUCTIONS
Headache/migraine treatment:   Abortive medications (for immediate treatment of a headache): It is ok to take ibuprofen, acetaminophen or naproxen (Advil, Tylenol,  Aleve, Excedrin) if they help your headaches you should limit these to No more than 3 times a week to avoid medication overuse/rebound headaches       For your more moderate to severe migraines take this medication early   Eletriptan 40mg tabs - take one at the onset of headache  May repeat one time after 1-2 hours if pain has not resolved  Max 2 a day     Prescription preventive medications for headaches/migraines   (to take every day to help prevent headaches - not to take at the time of headache):  [x]? ? amitriptyline continue 50 mg nightly     *Typically these types of medications take time untill you see the benefit, although some may see improvement in days, often it may take weeks, especially if the medication is being titrated up to a beneficial level  Please contact us if there are any concerns or questions regarding the medication       Sleep/headache prevention:    - Melatonin - you may take 3 mg nightly for sleep  You should take this 1 hour prior to bedtime consistently every night for it to work  It works by gradually helping to adjust your sleep time over days to weeks, rather than immediately making you feel sleepy        Self-Monitoring:  [x]? ? Headache calendar  Each day stefanie a number from 0-10 indicating if there was a headache and how bad it was   This can be used to monitor gradual improvement and is helpful to make medication adjustments  You can do this on paper or there is an SEAN for a smart phone called "Migraine e Diary"       Lifestyle Recommendations:  [x]? ? SLEEP - Maintain a regular sleep schedule: Adults need at least 7-8 hours of uninterrupted a night   Maintain good sleep hygiene:  Going to bed and waking up at consistent times, avoiding excessive daytime naps, avoiding caffeinated beverages in the evening, avoid excessive stimulation in the evening and generally using bed primarily for sleeping   One hour before bedtime would recommend turning lights down lower, decreasing your activity (may read quietly, listen to music at a low volume)  When you get into bed, should eliminate all technology (no texting, emailing, playing with your phone, iPad or tablet in bed)  [x]?? HYDRATION - Maintain good hydration   Drink  2L of fluid a day (4 typical small water bottles)  [x]? ? DIET - Maintain good nutrition  In particular don't skip meals and try and eat healthy balanced meals regularly  [x]? ? TRIGGERS - Look for other triggers and avoid them: Limit caffeine to 1-2 cups a day or less  Avoid dietary triggers that you have noticed bring on your headaches (this could include aged cheese, peanuts, MSG, aspartame and nitrates)  [x]? ? EXERCISE - physical exercise as we all know is good for you in many ways, and not only is good for your heart, but also is beneficial for your mental health, cognitive health and  chronic pain/headaches  I would encourage at the least 5 days of physical exercise weekly for at least 30 minutes       Education and Follow-up  [x]? ? Please call with any questions or concerns  Of course if any new concerning symptoms go to the emergency department  [x]? ? Follow up in 1 year, sooner if needed

## 2021-02-26 NOTE — TELEPHONE ENCOUNTER
Called and spoke to patient - informed her that 2022 schedule is not open yet, will call her back to schedule

## 2021-02-26 NOTE — PROGRESS NOTES
Review of Systems    {LimROS-complete:27344}      Review of Systems   Constitutional: Negative  Negative for appetite change and fever  HENT: Negative  Negative for hearing loss, tinnitus, trouble swallowing and voice change  Eyes: Negative  Negative for photophobia and pain  Respiratory: Negative  Negative for shortness of breath  Cardiovascular: Negative  Negative for palpitations  Gastrointestinal: Negative  Negative for nausea and vomiting  Endocrine: Negative  Negative for cold intolerance  Genitourinary: Negative  Negative for dysuria, frequency and urgency  Musculoskeletal: Negative  Negative for myalgias and neck pain  Skin: Negative  Negative for rash  Neurological: Negative  Negative for dizziness, tremors, seizures, syncope, facial asymmetry, speech difficulty, weakness, light-headedness, numbness and headaches  Hematological: Negative  Does not bruise/bleed easily  Psychiatric/Behavioral: Negative  Negative for confusion, hallucinations and sleep disturbance

## 2021-02-26 NOTE — TELEPHONE ENCOUNTER
[1:00 PM] Kimberly Dior      dont know if I messaged you, 1 year follow up for [facility] [place]

## 2021-02-28 ENCOUNTER — HOSPITAL ENCOUNTER (EMERGENCY)
Facility: HOSPITAL | Age: 56
Discharge: HOME | End: 2021-02-28
Attending: EMERGENCY MEDICINE | Admitting: EMERGENCY MEDICINE
Payer: COMMERCIAL

## 2021-02-28 VITALS
HEART RATE: 85 BPM | OXYGEN SATURATION: 100 % | SYSTOLIC BLOOD PRESSURE: 130 MMHG | RESPIRATION RATE: 20 BRPM | DIASTOLIC BLOOD PRESSURE: 63 MMHG | TEMPERATURE: 98.1 F

## 2021-02-28 DIAGNOSIS — M79.605 LEFT LEG PAIN: Primary | ICD-10-CM

## 2021-02-28 LAB
ANION GAP SERPL CALCULATED.3IONS-SCNC: 9 MMOL/L (ref 4–13)
BASOPHILS # BLD AUTO: 0.02 THOUSANDS/ΜL (ref 0–0.1)
BASOPHILS NFR BLD AUTO: 0 % (ref 0–1)
BUN SERPL-MCNC: 11 MG/DL (ref 5–25)
CALCIUM SERPL-MCNC: 9.2 MG/DL (ref 8.3–10.1)
CHLORIDE SERPL-SCNC: 101 MMOL/L (ref 100–108)
CO2 SERPL-SCNC: 30 MMOL/L (ref 21–32)
CREAT SERPL-MCNC: 0.75 MG/DL (ref 0.6–1.3)
D DIMER PPP FEU-MCNC: 0.67 UG/ML FEU
EOSINOPHIL # BLD AUTO: 0.06 THOUSAND/ΜL (ref 0–0.61)
EOSINOPHIL NFR BLD AUTO: 1 % (ref 0–6)
ERYTHROCYTE [DISTWIDTH] IN BLOOD BY AUTOMATED COUNT: 13.9 % (ref 11.6–15.1)
GFR SERPL CREATININE-BSD FRML MDRD: 89 ML/MIN/1.73SQ M
GLUCOSE SERPL-MCNC: 137 MG/DL (ref 65–140)
HCT VFR BLD AUTO: 42.8 % (ref 34.8–46.1)
HGB BLD-MCNC: 13.5 G/DL (ref 11.5–15.4)
IMM GRANULOCYTES # BLD AUTO: 0.03 THOUSAND/UL (ref 0–0.2)
IMM GRANULOCYTES NFR BLD AUTO: 1 % (ref 0–2)
LYMPHOCYTES # BLD AUTO: 1.47 THOUSANDS/ΜL (ref 0.6–4.47)
LYMPHOCYTES NFR BLD AUTO: 24 % (ref 14–44)
MCH RBC QN AUTO: 29 PG (ref 26.8–34.3)
MCHC RBC AUTO-ENTMCNC: 31.5 G/DL (ref 31.4–37.4)
MCV RBC AUTO: 92 FL (ref 82–98)
MONOCYTES # BLD AUTO: 0.42 THOUSAND/ΜL (ref 0.17–1.22)
MONOCYTES NFR BLD AUTO: 7 % (ref 4–12)
NEUTROPHILS # BLD AUTO: 4.11 THOUSANDS/ΜL (ref 1.85–7.62)
NEUTS SEG NFR BLD AUTO: 67 % (ref 43–75)
NRBC BLD AUTO-RTO: 0 /100 WBCS
PLATELET # BLD AUTO: 244 THOUSANDS/UL (ref 149–390)
PMV BLD AUTO: 10.1 FL (ref 8.9–12.7)
POTASSIUM SERPL-SCNC: 3.6 MMOL/L (ref 3.5–5.3)
RBC # BLD AUTO: 4.65 MILLION/UL (ref 3.81–5.12)
SODIUM SERPL-SCNC: 140 MMOL/L (ref 136–145)
WBC # BLD AUTO: 6.11 THOUSAND/UL (ref 4.31–10.16)

## 2021-02-28 PROCEDURE — 99284 EMERGENCY DEPT VISIT MOD MDM: CPT

## 2021-02-28 PROCEDURE — 36415 COLL VENOUS BLD VENIPUNCTURE: CPT | Performed by: EMERGENCY MEDICINE

## 2021-02-28 PROCEDURE — 80048 BASIC METABOLIC PNL TOTAL CA: CPT | Performed by: EMERGENCY MEDICINE

## 2021-02-28 PROCEDURE — 96372 THER/PROPH/DIAG INJ SC/IM: CPT

## 2021-02-28 PROCEDURE — 85379 FIBRIN DEGRADATION QUANT: CPT | Performed by: EMERGENCY MEDICINE

## 2021-02-28 PROCEDURE — 85025 COMPLETE CBC W/AUTO DIFF WBC: CPT | Performed by: EMERGENCY MEDICINE

## 2021-02-28 PROCEDURE — 99283 EMERGENCY DEPT VISIT LOW MDM: CPT | Performed by: EMERGENCY MEDICINE

## 2021-02-28 RX ADMIN — ENOXAPARIN SODIUM 180 MG: 100 INJECTION SUBCUTANEOUS at 22:07

## 2021-03-01 ENCOUNTER — HOSPITAL ENCOUNTER (OUTPATIENT)
Dept: VASCULAR ULTRASOUND | Facility: HOSPITAL | Age: 56
Discharge: HOME | End: 2021-03-01
Attending: EMERGENCY MEDICINE
Payer: COMMERCIAL

## 2021-03-01 DIAGNOSIS — M79.605 LEFT LEG PAIN: ICD-10-CM

## 2021-03-01 PROCEDURE — 93971 EXTREMITY STUDY: CPT

## 2021-03-01 PROCEDURE — 93971 EXTREMITY STUDY: CPT | Performed by: SURGERY

## 2021-03-12 NOTE — ED PROVIDER NOTES
History  Chief Complaint   Patient presents with    Leg Pain     Pt reports shes had left calf pain and warmth x 2 days  Pt reports she has a DVT hx       61 yo f presenting to the emergency department for eval of calf pain  Pt has had left calf warmth, pain, and swelling x 2 days  Has a hx of DVT and is concerned for recurrence, not currently on [facility]  No chest pain or sob  Prior to Admission Medications   Prescriptions Last Dose Informant Patient Reported? Taking? CHANTIX 1 MG tablet Not Taking at Unknown time Self No No   Sig: TAKE 1 TABLET BY MOUTH TWICE A DAY   Patient not taking: Reported on 5/29/2020   Galcanezumab-gnlm (Emgality, 300 MG Dose,) 100 MG/ML SOSY Not Taking at Unknown time Self No No   Sig: Inject 3 mL (300 mg total) under the skin every 30 (thirty) days   Patient not taking: Reported on 2/26/2021   HYDROcodone-acetaminophen (NORCO) 5-325 mg per tablet Not Taking at Unknown time Self Yes No   Sig: Take 1 tablet by mouth every 6 (six) hours as needed     LORazepam (ATIVAN) 1 mg tablet Not Taking at Unknown time Self No No   Sig: Take 1 mg 1 hour prior to MRI, may repeat x1 in 40 minutes p r n  Claustrophobia/anxiety  No driving  Patient not taking: Reported on 3/10/2020   amitriptyline (ELAVIL) 25 mg tablet 2/28/2021 at Unknown time Self No Yes   Sig: Take 2 tablets (50 mg total) by mouth daily at bedtime   eletriptan (RELPAX) 40 MG tablet Not Taking at Unknown time Self No No   Sig: Take 1 tablet at onset of migraine, May repeat once after 2 hours  Max 2 doses per 24 hours   BRAND NAME   Patient not taking: Reported on 5/29/2020   meloxicam (MOBIC) 15 mg tablet Not Taking at Unknown time Self Yes No   Sig: as needed    methylPREDNISolone 4 MG tablet therapy pack Not Taking at Unknown time Self No No   Sig: Use as directed on package   Patient not taking: Reported on 1/24/2020   pregabalin (LYRICA) 50 mg capsule Not Taking at Unknown time Self No No   Sig: Take 1 capsule (50 mg total) by mouth 2 (two) times a day   Patient not taking: Reported on 5/29/2020   verapamil (VERELAN) 240 MG 24 hr capsule Not Taking at Unknown time Self No No   Sig: Take 1 capsule (240 mg total) by mouth daily at bedtime   Patient not taking: Reported on 5/29/2020      Facility-Administered Medications: None       Past Medical History:   Diagnosis Date    Asymptomatic postmenopausal status     Back pain, lumbosacral     Cellulitis of left lower extremity     last assessed    3/30/16    resolved  [employer]   9/8/16      Cluster headaches     Colon polyps     Dense breasts     Foot pain, left     Head injury     Dec 2017 no LOC    Hip pain, right     Lumbosacral pain     Lymph edema     Right hip pain     Spider veins     Tendinitis of right shoulder     Varicose veins of legs        Past Surgical History:   Procedure Laterality Date    BACK SURGERY      type unknown- occurred during vaginal delivery    BREAST BIOPSY Left     HAND TENDON SURGERY      [place] REVISE MEDIAN N/CARPAL TUNNEL SURG Right 9/12/2016    Procedure: WRIST OPEN CARPAL TUNNEL RELEASE ;  Surgeon: Joesph Malone MD;  [facility];  Service: Orthopedics    TUBAL LIGATION      VEIN LIGATION AND STRIPPING Left        Family History   Problem Relation Age of Onset   Beau Desha Sudden death Mother         MVA hit by a drunk     Varicose Veins Father     Heart disease Father         cardiac disorder    Breast cancer Sister         neoplasm    Other Maternal Grandmother         gyn problem    Cervical cancer Maternal Grandmother     Breast cancer Maternal Aunt         neoplasm    Migraines Daughter     Cancer Sister         Appendix    Colon cancer Neg Hx     Ovarian cancer Neg Hx     Uterine cancer Neg Hx      I have reviewed and agree with the history as documented      E-Cigarette/Vaping    E-Cigarette Use Never User      E-Cigarette/Vaping Substances    Nicotine No     THC No     CBD No     Flavoring No     Other No     Unknown No      Social History     Tobacco Use    Smoking status: Former Smoker     Packs/day: 1 00     Quit date:      Years since quittin 1    Smokeless tobacco: Never Used   Substance Use Topics    Alcohol use: Yes     Comment: occassional    Drug use: No       Review of Systems   Constitutional: Negative for appetite change, chills, fatigue and fever  HENT: Negative for sneezing and sore throat  Eyes: Negative for visual disturbance  Respiratory: Negative for cough, choking, chest tightness, shortness of breath and wheezing  Cardiovascular: Positive for leg swelling  Negative for chest pain and palpitations  Gastrointestinal: Negative for abdominal pain, constipation, diarrhea, nausea and vomiting  Genitourinary: Negative for difficulty urinating and dysuria  Neurological: Negative for dizziness, weakness, light-headedness, numbness and headaches  All other systems reviewed and are negative  Physical Exam  Physical Exam  Constitutional:       General: She is not in acute distress  Appearance: She is well-developed  She is not diaphoretic  HENT:      Head: Normocephalic and atraumatic  Eyes:      Pupils: Pupils are equal, round, and reactive to light  Neck:      Vascular: No JVD  Trachea: No tracheal deviation  Cardiovascular:      Rate and Rhythm: Normal rate and regular rhythm  Heart sounds: Normal heart sounds  No murmur  No friction rub  No gallop  Pulmonary:      Effort: Pulmonary effort is normal  No respiratory distress  Breath sounds: Normal breath sounds  No wheezing or rales  Abdominal:      General: Bowel sounds are normal  There is no distension  Palpations: Abdomen is soft  Tenderness: There is no abdominal tenderness  There is no guarding or rebound  Musculoskeletal:      Left lower leg: She exhibits tenderness  Skin:     General: Skin is warm and dry  Coloration: Skin is not pale     Neurological:      Mental Status: She is alert and oriented to person, place, and time  Cranial Nerves: No cranial nerve deficit  Motor: No abnormal muscle tone     Psychiatric:         Behavior: Behavior normal          Vital Signs  ED Triage Vitals   Temperature Pulse Respirations Blood Pressure SpO2   02/28/21 2025 02/28/21 2025 02/28/21 2025 02/28/21 2025 02/28/21 2025   98 1 °F (36 7 °C) 100 18 145/82 99 %      Temp Source Heart Rate Source Patient Position - Orthostatic VS BP Location FiO2 (%)   02/28/21 2025 02/28/21 2025 02/28/21 2025 02/28/21 2025 --   Tympanic Monitor Lying Left arm       Pain Score       02/28/21 2100       No Pain           Vitals:    02/28/21 2025 02/28/21 2100   BP: 145/82 130/63   Pulse: 100 85   Patient Position - Orthostatic VS: Lying Lying         Visual Acuity      ED Medications  Medications   enoxaparin (LOVENOX) subcutaneous injection 180 mg (180 mg Subcutaneous Given 2/28/21 2207)       Diagnostic Studies  Results Reviewed     Procedure Component Value Units Date/Time    D-dimer, quantitative [[id number]]  (Abnormal) Collected: 02/28/21 2111    Lab Status: Final result Specimen: Blood from Arm, Right Updated: 02/28/21 2135     D-Dimer, Quant 0 67 ug/ml FEU     Basic metabolic panel [[id number]] Collected: 02/28/21 2111    Lab Status: Final result Specimen: Blood from Arm, Right Updated: 02/28/21 2129     Sodium 140 mmol/L      Potassium 3 6 mmol/L      Chloride 101 mmol/L      CO2 30 mmol/L      ANION GAP 9 mmol/L      BUN 11 mg/dL      Creatinine 0 75 mg/dL      Glucose 137 mg/dL      Calcium 9 2 mg/dL      eGFR 89 ml/min/1 73sq m     Narrative:      Ja guidelines for Chronic Kidney Disease (CKD):     Stage 1 with normal or high GFR (GFR > 90 mL/min/1 73 square meters)    Stage 2 Mild CKD (GFR = 60-89 mL/min/1 73 square meters)    Stage 3A Moderate CKD (GFR = 45-59 mL/min/1 73 square meters)    Stage 3B Moderate CKD (GFR = 30-44 mL/min/1 73 square meters)   Stage 4 Severe CKD (GFR = 15-29 mL/min/1 73 square meters)    Stage 5 End Stage CKD (GFR <15 mL/min/1 73 square meters)  Note: GFR calculation is accurate only with a steady state creatinine    CBC and differential [[id number]] Collected: 02/28/21 2111    Lab Status: Final result Specimen: Blood from Arm, Right Updated: 02/28/21 2118     WBC 6 11 Thousand/uL      RBC 4 65 Million/uL      Hemoglobin 13 5 g/dL      Hematocrit 42 8 %      MCV 92 fL      MCH 29 0 pg      MCHC 31 5 g/dL      RDW 13 9 %      MPV 10 1 fL      Platelets 385 Thousands/uL      nRBC 0 /100 WBCs      Neutrophils Relative 67 %      Immat GRANS % 1 %      Lymphocytes Relative 24 %      Monocytes Relative 7 %      Eosinophils Relative 1 %      Basophils Relative 0 %      Neutrophils Absolute 4 11 Thousands/µL      Immature Grans Absolute 0 03 Thousand/uL      Lymphocytes Absolute 1 47 Thousands/µL      Monocytes Absolute 0 42 Thousand/µL      Eosinophils Absolute 0 06 Thousand/µL      Basophils Absolute 0 02 Thousands/µL                  No orders to display              Procedures  Procedures         ED Course                             SBIRT 20yo+      Most Recent Value   SBIRT (22 yo +)   In order to provide better care to our patients, we are screening all of our patients for alcohol and drug use  Would it be okay to ask you these screening questions? Yes Filed at: 02/28/2021 2053   Initial Alcohol Screen: US AUDIT-C    1  How often do you have a drink containing alcohol? 1 Filed at: 02/28/2021 2053   2  How many drinks containing alcohol do you have on a typical day you are drinking? 1 Filed at: 02/28/2021 2053   3b  FEMALE Any Age, or MALE 65+: How often do you have 4 or more drinks on one occassion? 1 Filed at: 02/28/2021 2053   Audit-C Score  3 Filed at: 02/28/2021 2053   ELY: How many times in the past year have you    Used an illegal drug or used a prescription medication for non-medical reasons?   Never Filed at: 02/28/2021 2053 MDM  Number of Diagnoses or Management Options  Left leg pain:   Diagnosis management comments: 59 yo f with left leg pain swelling  unfortunately vascular studies are not available at this hour, discussed strategy with patient of checking ddimer, if positive, giving Lovenox and having her arrange outpt dvt study  Disposition  Final diagnoses:   Left leg pain     Time reflects when diagnosis was documented in both MDM as applicable and the Disposition within this note     Time User Action Codes Description Comment    2/28/2021  9:55 PM Solitario Ying Add [M95 062] Left leg pain       ED Disposition     ED Disposition Condition Date/Time Comment    Discharge Stable Sun Feb 28, 2021  9:55 PM Dorys Yoder discharge to home/self care  Follow-up Information     Follow up With Specialties Details Why Contact Info    Kiel Luna MD Internal Medicine   [id number]  [facility]  [phone]            Discharge Medication List as of 2/28/2021  9:58 PM      CONTINUE these medications which have NOT CHANGED    Details   amitriptyline (ELAVIL) 25 mg tablet Take 2 tablets (50 mg total) by mouth daily at bedtime, Starting Fri 9/11/2020, Normal      CHANTIX 1 MG tablet TAKE 1 TABLET BY MOUTH TWICE A DAY, Normal      eletriptan (RELPAX) 40 MG tablet Take 1 tablet at onset of migraine, May repeat once after 2 hours  Max 2 doses per 24 hours  BRAND NAME, Normal      Galcanezumab-gnlm (Emgality, 300 MG Dose,) 100 MG/ML SOSY Inject 3 mL (300 mg total) under the skin every 30 (thirty) days, Starting Thu 12/31/2020, Normal      HYDROcodone-acetaminophen (NORCO) 5-325 mg per tablet Take 1 tablet by mouth every 6 (six) hours as needed  , Starting Wed 11/2/2016, Historical Med      LORazepam (ATIVAN) 1 mg tablet Take 1 mg 1 hour prior to MRI, may repeat x1 in 40 minutes p r n  Claustrophobia/anxiety   No driving , Normal      meloxicam (MOBIC) 15 mg tablet as needed , Historical Med methylPREDNISolone 4 MG tablet therapy pack Use as directed on package, Normal      pregabalin (LYRICA) 50 mg capsule Take 1 capsule (50 mg total) by mouth 2 (two) times a day, Starting Wed 3/11/2020, Normal      verapamil (VERELAN) 240 MG 24 hr capsule Take 1 capsule (240 mg total) by mouth daily at bedtime, Starting Wed 3/11/2020, Normal           No discharge procedures on file      PDMP Review       Value Time User    PDMP Reviewed  Yes 1/24/2020  1:53 PM Erich Garcia MD          ED Provider  Electronically Signed by           Og Garrison MD  03/11/21 9803

## 2021-03-17 ENCOUNTER — ANNUAL EXAM (OUTPATIENT)
Dept: OBGYN CLINIC | Facility: CLINIC | Age: 56
End: 2021-03-17
Payer: COMMERCIAL

## 2021-03-17 VITALS
WEIGHT: 277 LBS | DIASTOLIC BLOOD PRESSURE: 80 MMHG | HEIGHT: 71 IN | SYSTOLIC BLOOD PRESSURE: 120 MMHG | BODY MASS INDEX: 38.78 KG/M2

## 2021-03-17 DIAGNOSIS — Z01.419 ENCOUNTER FOR GYNECOLOGICAL EXAMINATION (GENERAL) (ROUTINE) WITHOUT ABNORMAL FINDINGS: Primary | ICD-10-CM

## 2021-03-17 DIAGNOSIS — Z12.31 ENCOUNTER FOR SCREENING MAMMOGRAM FOR MALIGNANT NEOPLASM OF BREAST: ICD-10-CM

## 2021-03-17 DIAGNOSIS — Z78.0 ASYMPTOMATIC POSTMENOPAUSAL STATUS: ICD-10-CM

## 2021-03-17 PROCEDURE — 99396 PREV VISIT EST AGE 40-64: CPT | Performed by: NURSE PRACTITIONER

## 2021-03-17 PROCEDURE — 3008F BODY MASS INDEX DOCD: CPT | Performed by: NURSE PRACTITIONER

## 2021-03-17 PROCEDURE — G0145 SCR C/V CYTO,THINLAYER,RESCR: HCPCS | Performed by: NURSE PRACTITIONER

## 2021-03-17 PROCEDURE — 1036F TOBACCO NON-USER: CPT | Performed by: NURSE PRACTITIONER

## 2021-03-17 NOTE — PROGRESS NOTES
Diagnoses and all orders for this visit:    Encounter for gynecological examination (general) (routine) without abnormal findings  -     Liquid-based pap, screening    Asymptomatic postmenopausal status    Encounter for screening mammogram for malignant neoplasm of breast  -     Mammo screening bilateral w 3d & cad; Future        Call as needed, encouraged calcium/vit D in her diet, call with any PMB, all questions answered, applauded her smoking cessation  Pleasant 60 y o  postmenopausal female here for annual exam  She denies postmenopausal bleeding  Denies history of abnormal pap smears, last Pap NIL AND HPV NEG 2020, REQUESTS YEARLY PAPS, no pap today  Denies vaginal issues  Denies pelvic pain  Denies postmenopausal issues  Sexually active without any issues  Colonoscopy due again in 2024 by Dr Doree Bence  Stopped smoking 1 yr ago  Past Medical History:   Diagnosis Date    Asymptomatic postmenopausal status     Back pain, lumbosacral     Cellulitis of left lower extremity     last assessed    3/30/16    resolved  Sheyla Serum   9/8/16      Cluster headaches     Colon polyps     Dense breasts     Foot pain, left     Head injury     Dec 2017 no LOC    Hip pain, right     Lumbosacral pain     Lymph edema     Right hip pain     Spider veins     Tendinitis of right shoulder     Varicose veins of legs      Past Surgical History:   Procedure Laterality Date    BACK SURGERY      type unknown- occurred during vaginal delivery    BREAST BIOPSY Left     HAND TENDON SURGERY      [place] REVISE MEDIAN N/CARPAL TUNNEL SURG Right 9/12/2016    Procedure: WRIST OPEN CARPAL TUNNEL RELEASE ;  Surgeon: Cleve Morrell MD;  [facility];  Service: Orthopedics    TOTAL HIP ARTHROPLASTY  08/24/2020    TUBAL LIGATION      VEIN LIGATION AND STRIPPING Left      Family History   Problem Relation Age of Onset   [facility] Sudden death Mother         MVA hit by a drunk     Varicose Veins Father     Heart disease Father cardiac disorder    Breast cancer Sister         neoplasm    Other Maternal Grandmother         gyn problem    Cervical cancer Maternal Grandmother     Breast cancer Maternal Aunt         neoplasm    Migraines Daughter     Cancer Sister         Appendix    Colon cancer Neg Hx     Ovarian cancer Neg Hx     Uterine cancer Neg Hx      Social History     Tobacco Use    Smoking status: Former Smoker     Packs/day:      Quit date:      Years since quittin 2    Smokeless tobacco: Never Used   Substance Use Topics    Alcohol use: Yes     Comment: occassional    Drug use: No       Current Outpatient Medications:     amitriptyline (ELAVIL) 25 mg tablet, Take 2 tablets (50 mg total) by mouth daily at bedtime, Disp: 180 tablet, Rfl: 1    CHANTIX 1 MG tablet, TAKE 1 TABLET BY MOUTH TWICE A DAY (Patient not taking: Reported on 2020), Disp: 60 tablet, Rfl: 1    eletriptan (RELPAX) 40 MG tablet, Take 1 tablet at onset of migraine, May repeat once after 2 hours  Max 2 doses per 24 hours  BRAND NAME (Patient not taking: Reported on 2020), Disp: 36 tablet, Rfl: 0    Galcanezumab-gnlm (Emgality, 300 MG Dose,) 100 MG/ML SOSY, Inject 3 mL (300 mg total) under the skin every 30 (thirty) days (Patient not taking: Reported on 2021), Disp: 3 Syringe, Rfl: 5    HYDROcodone-acetaminophen (NORCO) 5-325 mg per tablet, Take 1 tablet by mouth every 6 (six) hours as needed  , Disp: , Rfl:     LORazepam (ATIVAN) 1 mg tablet, Take 1 mg 1 hour prior to MRI, may repeat x1 in 40 minutes p r n  Claustrophobia/anxiety  No driving   (Patient not taking: Reported on 3/10/2020), Disp: 2 tablet, Rfl: 0    meloxicam (MOBIC) 15 mg tablet, as needed , Disp: , Rfl:     methylPREDNISolone 4 MG tablet therapy pack, Use as directed on package (Patient not taking: Reported on 2020), Disp: 1 each, Rfl: 1    pregabalin (LYRICA) 50 mg capsule, Take 1 capsule (50 mg total) by mouth 2 (two) times a day (Patient not taking: Reported on 2020), Disp: 60 capsule, Rfl: 1    verapamil (VERELAN) 240 MG 24 hr capsule, Take 1 capsule (240 mg total) by mouth daily at bedtime (Patient not taking: Reported on 2020), Disp: 30 capsule, Rfl: 1  Patient Active Problem List    Diagnosis Date Noted    Asymptomatic postmenopausal status 2019    Cluster headaches 2018    Varicose veins of legs 2018    Thrombophlebitis of superficial veins of right lower extremity 2018    Chronic venous insufficiency 2018    Lymphedema 2018    Dense breasts 02/15/2017    Back pain, lumbosacral 2016       No Known Allergies    OB History    Para Term  AB Living   2 2 2     2   SAB TAB Ectopic Multiple Live Births           2      # Outcome Date GA Lbr Samuel/2nd Weight Sex Delivery Anes PTL Lv   2 Term            1 Term              Works in [employer] part time  2 grown son and daughter, no grands yet    Vitals:    21 1300   BP: 120/80   BP Location: Left arm   Patient Position: Standing   Cuff Size: Standard   Weight: 126 kg (277 lb)   Height: 5' 11" (1 803 m)     Body mass index is 38 63 kg/m²  Review of Systems   Constitutional: Negative for chills, fatigue, fever and unexpected weight change  Respiratory: Negative for shortness of breath  Gastrointestinal: Negative for anal bleeding, blood in stool, constipation and diarrhea  Genitourinary: Negative for difficulty urinating, dysuria and hematuria  Physical Exam   Constitutional: She appears well-developed and well-nourished  No distress  HENT: atraumatic, EOMI  Head: Normocephalic  Neck: Normal range of motion  Neck supple  Pulmonary: Effort normal   Breasts: bilateral without masses, skin changes or nipple discharge  Bilaterally soft and warm to touch  No areas of erythema or pain  Abdominal: Soft  Pelvic exam was performed with patient supine  No labial fusion   There is no rash, tenderness or injury on the right labia  Right labial inclusion cyst unchanged There is no rash, tenderness, lesion or injury on the left labia  Urethral meatus does not show any tenderness, inflammation or discharge  Palpation of midline bladder without pain or discomfort  Uterus is not deviated, not enlarged, not fixed and not tender  Cervix exhibits no motion tenderness, no discharge and no friability  Right adnexum displays no mass, no tenderness and no fullness  Left adnexum displays no mass, no tenderness and no fullness  No erythema or tenderness in the vagina  No foreign body in the vagina  No signs of injury around the vagina or anus  Perineum without lesions, signs of injury, erythema or swelling  No vaginal discharge found  Lymphadenopathy:        Right: No inguinal adenopathy present  Left: No inguinal adenopathy present

## 2021-03-17 NOTE — PATIENT INSTRUCTIONS
Breast Self Exam for Women   WHAT YOU NEED TO KNOW:   What is a breast self-exam (BSE)? A BSE is a way to check your breasts for lumps and other changes  Regular BSEs can help you know how your breasts normally look and feel  Most breast lumps or changes are not cancer, but you should always have them checked by a healthcare provider  Your healthcare provider can also watch you do a BSE and can tell you if you are doing your BSE correctly  Why should I do a BSE? Breast cancer is the most common type of cancer in women  Even if you have mammograms, you may still want to do a BSE regularly  If you know how your breasts normally feel and look, it may help you know when to contact your healthcare provider  Mammograms can miss some cancers  You may find a lump during a BSE that did not show up on a mammogram   When should I do a BSE? If you have periods, you may want to do your BSE 1 week after your period ends  This is the time when your breasts may be the least swollen, lumpy, or tender  You can do regular BSEs even if you are breastfeeding or have breast implants  How should I do a BSE? · Look at your breasts in a mirror  Look at the size and shape of each breast and nipple  Check for swelling, lumps, dimpling, scaly skin, or other skin changes  Look for nipple changes, such as a nipple that is painful or beginning to pull inward  Gently squeeze both nipples and check to see if fluid (that is not breast milk) comes out of them  If you find any of these or other breast changes, contact your healthcare provider  Check your breasts while you sit or  the following 3 positions:    ? Hang your arms down at your sides  ? Raise your hands and join them behind your head  ? Put firm pressure with your hands on your hips  Bend slightly forward while you look at your breasts in the mirror  · Lie down and feel your breasts    When you lie down, your breast tissue spreads out evenly over your chest  This makes it easier for you to feel for lumps and anything that may not be normal for your breasts  Do a BSE on one breast at a time  ? Place a small pillow or towel under your left shoulder  Put your left arm behind your head  ? Use the 3 middle fingers of your right hand  Use your fingertip pads, on the top of your fingers  Your fingertip pad is the most sensitive part of your finger  ? Use small circles to feel your breast tissue  Use your fingertip pads to make dime-sized, overlapping circles on your breast and armpits  Use light, medium, and firm pressure  First, press lightly  Second, press with medium pressure to feel a little deeper into the breast  Last, use firm pressure to feel deep within your breast     ? Examine your entire breast area  Examine the breast area from above the breast to below the breast where you feel only ribs  Make small circles with your fingertips, starting in the middle of your armpit  Make circles going up and down the breast area  Continue toward your breast and all the way across it  Examine the area from your armpit all the way over to the middle of your chest (breastbone)  Stop at the middle of your chest     ? Move the pillow or towel to your right shoulder, and put your right arm behind your head  Use the 3 fingertip pads of your left hand, and repeat the above steps to do a BSE on your right breast   What else can I do to check for breast problems or cancer? Talk to your healthcare provider about mammograms  A mammogram is an x-ray of your breasts to screen for breast cancer or other problems  Your provider can tell you the benefits and risks of mammograms  The first mammogram is usually at age 39 or 48  Your provider may recommend you start at 36 or younger if your risk for breast cancer is high  Mammograms usually continue every 1 to 2 years until age 76  When should I call my doctor? · You find any lumps or changes in your breasts      · You have breast pain or fluid coming from your nipples  · You have questions or concerns or concerns about your condition or care  CARE AGREEMENT:   You have the right to help plan your care  Learn about your health condition and how it may be treated  Discuss treatment options with your healthcare providers to decide what care you want to receive  You always have the right to refuse treatment  The above information is an  only  It is not intended as medical advice for individual conditions or treatments  Talk to your doctor, nurse or pharmacist before following any medical regimen to see if it is safe and effective for you  © Copyright [address] [place] [address] Information is for End User's use only and may not be sold, redistributed or otherwise used for commercial purposes   All illustrations and images included in CareNotes® are the copyrighted property of A D A M , Inc  or [address] [place] [address]

## 2021-03-23 LAB
LAB AP GYN PRIMARY INTERPRETATION: NORMAL
Lab: NORMAL

## 2021-04-12 ENCOUNTER — HOSPITAL ENCOUNTER (EMERGENCY)
Facility: HOSPITAL | Age: 56
Discharge: HOME | End: 2021-04-12
Attending: EMERGENCY MEDICINE
Payer: COMMERCIAL

## 2021-04-12 VITALS
RESPIRATION RATE: 18 BRPM | BODY MASS INDEX: 36.4 KG/M2 | SYSTOLIC BLOOD PRESSURE: 147 MMHG | OXYGEN SATURATION: 99 % | TEMPERATURE: 98.3 F | WEIGHT: 260 LBS | HEIGHT: 71 IN | HEART RATE: 84 BPM | DIASTOLIC BLOOD PRESSURE: 68 MMHG

## 2021-04-12 DIAGNOSIS — I83.899 BLEEDING FROM VARICOSE VEIN: Primary | ICD-10-CM

## 2021-04-12 PROCEDURE — 99284 EMERGENCY DEPT VISIT MOD MDM: CPT | Performed by: EMERGENCY MEDICINE

## 2021-04-12 PROCEDURE — 99283 EMERGENCY DEPT VISIT LOW MDM: CPT

## 2021-04-12 NOTE — ED PROVIDER NOTES
History  Chief Complaint   Patient presents with    Bleeding/Bruising     per patient one of the veins in her leg started to bleed but has clotted now  Last time she had this done it had to be cauterized  HPI    RLE, ankle with varicose vein that was bleeding, now resolved  No pain, no thinners, no signs of infection  Patient requesting cautery however the bleeding vericose vein is very small  I covered it with skin glue and advised f/u OP w vascular  Prior to Admission Medications   Prescriptions Last Dose Informant Patient Reported? Taking? CHANTIX 1 MG tablet  Self No No   Sig: TAKE 1 TABLET BY MOUTH TWICE A DAY   Patient not taking: Reported on 5/29/2020   Galcanezumab-gnlm (Emgality, 300 MG Dose,) 100 MG/ML SOSY  Self No No   Sig: Inject 3 mL (300 mg total) under the skin every 30 (thirty) days   Patient not taking: Reported on 2/26/2021   HYDROcodone-acetaminophen (NORCO) 5-325 mg per tablet  Self Yes No   Sig: Take 1 tablet by mouth every 6 (six) hours as needed     LORazepam (ATIVAN) 1 mg tablet  Self No No   Sig: Take 1 mg 1 hour prior to MRI, may repeat x1 in 40 minutes p r n  Claustrophobia/anxiety  No driving  Patient not taking: Reported on 3/10/2020   amitriptyline (ELAVIL) 25 mg tablet  Self No No   Sig: Take 2 tablets (50 mg total) by mouth daily at bedtime   eletriptan (RELPAX) 40 MG tablet  Self No No   Sig: Take 1 tablet at onset of migraine, May repeat once after 2 hours  Max 2 doses per 24 hours   BRAND NAME   Patient not taking: Reported on 5/29/2020   meloxicam (MOBIC) 15 mg tablet  Self Yes No   Sig: as needed    methylPREDNISolone 4 MG tablet therapy pack  Self No No   Sig: Use as directed on package   Patient not taking: Reported on 1/24/2020   pregabalin (LYRICA) 50 mg capsule  Self No No   Sig: Take 1 capsule (50 mg total) by mouth 2 (two) times a day   Patient not taking: Reported on 5/29/2020   verapamil (VERELAN) 240 MG 24 hr capsule  Self No No   Sig: Take 1 capsule (240 mg total) by mouth daily at bedtime   Patient not taking: Reported on 5/29/2020      Facility-Administered Medications: None       Past Medical History:   Diagnosis Date    Asymptomatic postmenopausal status     Back pain, lumbosacral     Cellulitis of left lower extremity     last assessed    3/30/16    resolved  Joao Gipson   9/8/16      Cluster headaches     Colon polyps     Dense breasts     Foot pain, left     Head injury     Dec 2017 no LOC    Hip pain, right     Lumbosacral pain     Lymph edema     Right hip pain     Spider veins     Tendinitis of right shoulder     Varicose veins of legs        Past Surgical History:   Procedure Laterality Date    BACK SURGERY      type unknown- occurred during vaginal delivery    BREAST BIOPSY Left     HAND TENDON SURGERY      VT REVISE MEDIAN N/CARPAL TUNNEL SURG Right 9/12/2016    Procedure: WRIST OPEN CARPAL TUNNEL RELEASE ;  Surgeon: Cindy Martin MD;  [facility];  Service: Orthopedics    TOTAL HIP ARTHROPLASTY  08/24/2020    TUBAL LIGATION      VEIN LIGATION AND STRIPPING Left        Family History   Problem Relation Age of Onset   [facility] Sudden death Mother         MVA hit by a drunk     Varicose Veins Father     Heart disease Father         cardiac disorder    Breast cancer Sister         neoplasm    Other Maternal Grandmother         gyn problem    Cervical cancer Maternal Grandmother     Breast cancer Maternal Aunt         neoplasm    Migraines Daughter     Cancer Sister         Appendix    Colon cancer Neg Hx     Ovarian cancer Neg Hx     Uterine cancer Neg Hx      I have reviewed and agree with the history as documented      E-Cigarette/Vaping    E-Cigarette Use Never User      E-Cigarette/Vaping Substances    Nicotine No     THC No     CBD No     Flavoring No     Other No     Unknown No      Social History     Tobacco Use    Smoking status: Former Smoker     Packs/day: 1 00     Quit date: 2020     Years since quittin 2    Smokeless tobacco: Never Used   Substance Use Topics    Alcohol use: Not Currently     Comment: occassional    Drug use: No       Review of Systems   Hematological: Does not bruise/bleed easily  Varicose vein, right lower extremity, medial ankle  Bleeding is resolved, scabbed over  All other systems reviewed and are negative  Physical Exam  Physical Exam  Vitals signs reviewed  Constitutional:       General: She is not in acute distress  Appearance: She is well-developed  She is not diaphoretic  HENT:      Head: Normocephalic and atraumatic  Eyes:      Conjunctiva/sclera: Conjunctivae normal    Neck:      Musculoskeletal: Normal range of motion  Pulmonary:      Effort: Pulmonary effort is normal  No respiratory distress  Musculoskeletal: Normal range of motion  Right lower leg: Edema present  Left lower leg: Edema present  Comments: Baseline lymphedema bilateral lower extremities  Bleeding varicose vein R medial right ankle, resolved, scabbed over, no tenderness  Skin:     General: Skin is warm and dry  Coloration: Skin is not pale  Neurological:      Mental Status: She is alert and oriented to person, place, and time  Cranial Nerves: No cranial nerve deficit     Psychiatric:         Behavior: Behavior normal          Vital Signs  ED Triage Vitals [21 0715]   Temperature Pulse Respirations Blood Pressure SpO2   98 3 °F (36 8 °C) 84 18 147/68 99 %      Temp src Heart Rate Source Patient Position - Orthostatic VS BP Location FiO2 (%)   -- Monitor Lying Right arm --      Pain Score       --           Vitals:    21 0715   BP: 147/68   Pulse: 84   Patient Position - Orthostatic VS: Lying         Visual Acuity      ED Medications  Medications - No data to display    Diagnostic Studies  Results Reviewed     None                 No orders to display              Procedures  Laceration repair    Date/Time: 2021 7:49 AM  Performed by: Amy Pedroza DO  Authorized by: Amy Pedroza DO   Consent: Verbal consent obtained  Risks and benefits: risks, benefits and alternatives were discussed  Consent given by: patient  Body area: lower extremity (Right ankle, medial)      Procedure Details:  Comments: Resolved bleeding varicose vein, Closed with skin glue  ED Course                             SBIRT 22yo+      Most Recent Value   SBIRT (22 yo +)   In order to provide better care to our patients, we are screening all of our patients for alcohol and drug use  Would it be okay to ask you these screening questions? Yes Filed at: 04/12/2021 0730   Initial Alcohol Screen: US AUDIT-C    1  How often do you have a drink containing alcohol?  0 Filed at: 04/12/2021 0730   2  How many drinks containing alcohol do you have on a typical day you are drinking? 0 Filed at: 04/12/2021 0730   3a  Male UNDER 65: How often do you have five or more drinks on one occasion? 0 Filed at: 04/12/2021 0730   Audit-C Score  0 Filed at: 04/12/2021 0730   ELY: How many times in the past year have you    Used an illegal drug or used a prescription medication for non-medical reasons? Never Filed at: 04/12/2021 0730                    MDM  Number of Diagnoses or Management Options  Bleeding from varicose vein:   Diagnosis management comments: Resolved closed bleeding varicose vein, covered with skin glue  Advised follow-up outpatient with vascular as needed  Advised return precautions in case of rebleeding or signs of infection        Disposition  Final diagnoses:   Bleeding from varicose vein     Time reflects when diagnosis was documented in both MDM as applicable and the Disposition within this note     Time User Action Codes Description Comment    4/12/2021  7:30 AM Melissa Price Add [I83 899] Bleeding from varicose vein       ED Disposition     ED Disposition Condition Date/Time Comment    Discharge Stable Mon Apr 12, 2021 7:30 AM Narciso Loges discharge to home/self care  Follow-up Information     Follow up With Specialties Details Why Contact Info Additional Information    Belinda Valdez MD Internal Medicine Schedule an appointment as soon as possible for a visit  For follow up to ensure improvement, and for further testing and treatment as needed [phone]  [place] [phone]       [facility] Emergency Department Emergency Medicine  If symptoms worsen [address] Emergency Department, [address] [place] [address], [id number]    The [address] [place] [address] Vascular Surgery Schedule an appointment as soon as possible for a visit  As needed [address]  [address] [facility]  [phone] The [address] [place] [address], [address] [place] [address], [phone]          Patient's Medications   Discharge Prescriptions    No medications on file     No discharge procedures on file      PDMP Review       Value Time User    PDMP Reviewed  Yes 1/24/2020  1:53 PM Omar Moreau MD          ED Provider  Electronically Signed by           Edda Sawant DO  04/12/21 5624

## 2021-04-21 ENCOUNTER — OFFICE VISIT (OUTPATIENT)
Dept: INTERNAL MEDICINE CLINIC | Facility: CLINIC | Age: 56
End: 2021-04-21
Payer: COMMERCIAL

## 2021-04-21 VITALS
HEIGHT: 71 IN | WEIGHT: 277 LBS | RESPIRATION RATE: 18 BRPM | TEMPERATURE: 98.6 F | BODY MASS INDEX: 38.78 KG/M2 | OXYGEN SATURATION: 97 % | SYSTOLIC BLOOD PRESSURE: 136 MMHG | HEART RATE: 89 BPM | DIASTOLIC BLOOD PRESSURE: 80 MMHG

## 2021-04-21 DIAGNOSIS — I87.2 CHRONIC VENOUS INSUFFICIENCY: ICD-10-CM

## 2021-04-21 DIAGNOSIS — Z13.6 SCREENING FOR HEART DISEASE: ICD-10-CM

## 2021-04-21 DIAGNOSIS — I89.0 LYMPHEDEMA: ICD-10-CM

## 2021-04-21 DIAGNOSIS — Z00.00 ANNUAL PHYSICAL EXAM: Primary | ICD-10-CM

## 2021-04-21 DIAGNOSIS — I83.93 VARICOSE VEINS OF BOTH LOWER EXTREMITIES, UNSPECIFIED WHETHER COMPLICATED: ICD-10-CM

## 2021-04-21 PROCEDURE — 99396 PREV VISIT EST AGE 40-64: CPT | Performed by: PHYSICIAN ASSISTANT

## 2021-04-21 NOTE — PATIENT INSTRUCTIONS
General medical exam is good with the exception of the patient's weight  Patient will start working on a gradual exercise program and better diet to reduce her weight  Would schedule follow-up in 1 year, sooner as needed

## 2021-04-21 NOTE — PROGRESS NOTES
Assessment/Plan:   Patient Instructions     General medical exam is good with the exception of the patient's weight  Patient will start working on a gradual exercise program and better diet to reduce her weight  Would schedule follow-up in 1 year, sooner as needed  Quality Measures: BMI Counseling: Body mass index is 38 63 kg/m²  The BMI is above normal  Nutrition recommendations include decreasing portion sizes, encouraging healthy choices of fruits and vegetables, consuming healthier snacks and moderation in carbohydrate intake  Exercise recommendations include exercising 3-5 times per week  Return in about 1 year (around 4/21/2022) for Annual physical          Diagnoses and all orders for this visit:    Annual physical exam    Lymphedema    Chronic venous insufficiency    Varicose veins of both lower extremities, unspecified whether complicated    Screening for heart disease  -     Comprehensive metabolic panel; Future  -     Lipid panel; Future          Subjective:      Patient ID: Jono Espino is a 60 y o  female  55-year-old female presents for annual physical   Overall states she is feeling "so-so"  She and her family had COVID-19 disease  She reports mild symptoms at the time but still has feelings of fatigue, and ever since having the disease has a right shoulder pain  She states it tends to come and go  No complaints of loss of sense of smell or taste  No chronic shortness of breath  No cough  Patient has recently been seen in the ER for a bleeding varicose vein  They apparently glued the vein to stop the bleeding  Patient has a follow-up with vascular surgery next week  Chronic lymphedema unchanged  EMR has been reviewed, clarified, and updated with patient today        ALLERGIES:  No Known Allergies    CURRENT MEDICATIONS:    Current Outpatient Medications:     amitriptyline (ELAVIL) 25 mg tablet, Take 2 tablets (50 mg total) by mouth daily at bedtime, Disp: 180 tablet, Rfl: 1    eletriptan (RELPAX) 40 MG tablet, Take 1 tablet at onset of migraine, May repeat once after 2 hours  Max 2 doses per 24 hours  BRAND NAME, Disp: 36 tablet, Rfl: 0    HYDROcodone-acetaminophen (NORCO) 5-325 mg per tablet, Take 1 tablet by mouth every 6 (six) hours as needed  , Disp: , Rfl:     ACTIVE PROBLEM LIST:  Patient Active Problem List   Diagnosis    Thrombophlebitis of superficial veins of right lower extremity    Chronic venous insufficiency    Lymphedema    Back pain, lumbosacral    Dense breasts    Varicose veins of legs    Cluster headaches    Asymptomatic postmenopausal status       PAST MEDICAL HISTORY:  Past Medical History:   Diagnosis Date    Asymptomatic postmenopausal status     Back pain, lumbosacral     Cellulitis of left lower extremity     last assessed    3/30/16    resolved  [place] Banks   9/8/16      Cluster headaches     Colon polyps     Dense breasts     Foot pain, left     Head injury     Dec 2017 no LOC    Hip pain, right     Lumbosacral pain     Lymph edema     Right hip pain     Spider veins     Tendinitis of right shoulder     Varicose veins of legs        PAST SURGICAL HISTORY:  Past Surgical History:   Procedure Laterality Date    BACK SURGERY      type unknown- occurred during vaginal delivery    BREAST BIOPSY Left     HAND TENDON SURGERY      UT REVISE MEDIAN N/CARPAL TUNNEL SURG Right 9/12/2016    Procedure: WRIST OPEN CARPAL TUNNEL RELEASE ;  Surgeon: Chelsie Wilcox MD;  [facility];  Service: Orthopedics    TOTAL HIP ARTHROPLASTY  08/24/2020    TUBAL LIGATION      VEIN LIGATION AND STRIPPING Left        FAMILY HISTORY:  Family History   Problem Relation Age of Onset   [facility] Sudden death Mother         MVA hit by a drunk     Varicose Veins Father     Heart disease Father         cardiac disorder    Breast cancer Sister         neoplasm    Other Maternal Grandmother         gyn problem    Cervical cancer Maternal Grandmother  Breast cancer Maternal Aunt         neoplasm    Migraines Daughter     Cancer Sister         Appendix    Colon cancer Neg Hx     Ovarian cancer Neg Hx     Uterine cancer Neg Hx        SOCIAL HISTORY:  Social History     Socioeconomic History    Marital status: /Civil Union     Spouse name: Not on file    Number of children: 2    Years of education: Not on file    Highest education level: Not on file   Occupational History     Comment: full time - [employer]   Social Needs    Financial resource strain: Not on file    Food insecurity     Worry: Not on file     Inability: Not on file   [employer] needs     Medical: Not on file     Non-medical: Not on file   Tobacco Use    Smoking status: Former Smoker     Packs/day:      Quit date:      Years since quittin 3    Smokeless tobacco: Never Used   Substance and Sexual Activity    Alcohol use: Not Currently     Comment: occassional    Drug use: No    Sexual activity: Yes     Birth control/protection: Surgical, Female Sterilization   Lifestyle    Physical activity     Days per week: Not on file     Minutes per session: Not on file    Stress: Not on file   Relationships    Social connections     Talks on phone: Not on file     Gets together: Not on file     Attends Zoroastrian service: Not on file     Active member of club or organization: Not on file     Attends meetings of clubs or organizations: Not on file     Relationship status: Not on file    Intimate partner violence     Fear of current or ex partner: Not on file     Emotionally abused: Not on file     Physically abused: Not on file     Forced sexual activity: Not on file   Other Topics Concern    Not on file   Social History Narrative    Brushes teeth twice a day    Dental care regularly        Two children       Review of Systems   Constitutional: Negative for activity change, chills, fatigue and fever  HENT: Negative for congestion      Eyes: Negative for discharge  Respiratory: Negative for cough, chest tightness and shortness of breath  Cardiovascular: Negative for chest pain, palpitations and leg swelling  Gastrointestinal: Negative for abdominal pain, blood in stool, constipation, diarrhea, nausea and vomiting  Endocrine: Negative for polydipsia, polyphagia and polyuria  Genitourinary: Negative for difficulty urinating  Musculoskeletal: Negative for arthralgias and myalgias  Skin: Negative for rash  Allergic/Immunologic: Negative for immunocompromised state  Neurological: Negative for dizziness, syncope, weakness, light-headedness and headaches  Hematological: Negative for adenopathy  Does not bruise/bleed easily  Psychiatric/Behavioral: Negative for dysphoric mood, sleep disturbance and suicidal ideas  The patient is not nervous/anxious  Objective:  Vitals:    04/21/21 1500   BP: 142/80   Pulse: 89   Resp: 18   Temp: 98 6 °F (37 °C)   SpO2: 97%   Weight: 126 kg (277 lb)   Height: 5' 11" (1 803 m)     Body mass index is 38 63 kg/m²  Physical Exam  Vitals signs and nursing note reviewed  Constitutional:       General: She is not in acute distress  Appearance: She is well-developed  She is not ill-appearing  Comments: Morbid obesity   HENT:      Head: Normocephalic and atraumatic  Right Ear: Tympanic membrane, ear canal and external ear normal       Left Ear: Tympanic membrane, ear canal and external ear normal       Nose: Nose normal       Comments: Crusting left side of nose     Mouth/Throat:      Mouth: Mucous membranes are moist       Pharynx: Oropharynx is clear  Eyes:      General: No scleral icterus  Right eye: No discharge  Left eye: No discharge  Conjunctiva/sclera: Conjunctivae normal       Pupils: Pupils are equal, round, and reactive to light  Neck:      Musculoskeletal: Normal range of motion and neck supple  Thyroid: No thyromegaly  Vascular: No carotid bruit  Cardiovascular:      Rate and Rhythm: Normal rate and regular rhythm  Pulses: Normal pulses  Heart sounds: Normal heart sounds  No murmur  Pulmonary:      Effort: Pulmonary effort is normal  No respiratory distress  Breath sounds: Normal breath sounds  Chest:      Chest wall: No tenderness  Abdominal:      General: Abdomen is flat  Bowel sounds are normal  There is no distension  Palpations: Abdomen is soft  There is no hepatomegaly, splenomegaly or mass  Tenderness: There is no abdominal tenderness  There is no right CVA tenderness, left CVA tenderness, guarding or rebound  Musculoskeletal: Normal range of motion  General: No swelling or tenderness  Right lower leg: Edema present  Left lower leg: Edema present  Comments: Chronic lymphedema of bilateral lower extremities with multiple varicosities  No active bleeding at this time  Lymphadenopathy:      Cervical: No cervical adenopathy  Skin:     General: Skin is warm and dry  Findings: No rash  Neurological:      General: No focal deficit present  Mental Status: She is alert and oriented to person, place, and time  Cranial Nerves: No cranial nerve deficit  Sensory: No sensory deficit  Motor: No weakness  Coordination: Coordination normal       Gait: Gait normal       Deep Tendon Reflexes: Reflexes are normal and symmetric  Reflexes normal    Psychiatric:         Mood and Affect: Mood normal          Behavior: Behavior normal          Thought Content:  Thought content normal          Judgment: Judgment normal            RESULTS:    Recent Results (from the past 1008 hour(s))   Liquid-based pap, screening    Collection Time: 03/17/21  2:02 PM   Result Value Ref Range    Case Report       Gynecologic Cytology Report                       Case: [id number]                                  Authorizing Provider:  REINALDO Mims       Collected:           03/17/2021 1406 Ordering Location:     [facility]etric[facility] &     Received:            03/17/2021 [address] [place] [address]                                     [facility]                                                                               Jose Juan                                                                       First Screen:          Lawrnce Manner, CT                                                    Specimen:    LIQUID-BASED PAP, SCREENING, Cervix                                                        Primary Interpretation Negative for intraepithelial lesion or malignancy     Specimen Adequacy       Satisfactory for evaluation  Endocervical/transformation zone component present  Additional Information       C3[employer]'s FDA approved ,  and ThinPrep Imaging Duo System are utilized with strict adherence to the 's instruction manual to prepare gynecologic and non-gynecologic cytology specimens for the production of ThinPrep slides as well as for gynecologic ThinPrep imaging  These processes have been validated by our laboratory and/or by the   The Pap test is not a diagnostic procedure and should not be used as the sole means to detect cervical cancer  It is only a screening procedure to aid in the detection of cervical cancer and its precursors  Both false-negative and false-positive results have been experienced  Your patient's test result should be interpreted in this context together with the history and clinical findings  This note was created with voice recognition software  Phonic, grammatical and spelling errors may be present within the note as a result

## 2021-04-22 ENCOUNTER — TELEPHONE (OUTPATIENT)
Dept: ADMINISTRATIVE | Facility: OTHER | Age: 56
End: 2021-04-22

## 2021-04-22 NOTE — LETTER
Procedure Request Form: Colonoscopy      Date Requested: 21  Patient: Luna Corrales  Patient : 1965   Referring Provider: Alida Small, MD        Date of Procedure ______________________________       The above patient has informed us that they have completed their   most recent Colonoscopy at your facility  Please complete   this form and attach all corresponding procedure reports/results  Comments __________________________________________________________  ____________________________________________________________________  ____________________________________________________________________  ____________________________________________________________________    Facility Completing Procedure _________________________________________    Form Completed By (print name) _______________________________________      Signature __________________________________________________________      These reports are needed for  compliance    Please fax this completed form and a copy of the procedure report to our office located at [place] [address] as soon as possible to [phone] Critical access hospital [facility] [phone]    We thank you for your assistance in treating our mutual patient     Dr Gerber Mix [phone] F [phone]

## 2021-04-22 NOTE — TELEPHONE ENCOUNTER
----- Message from Susan Dickson sent at 4/21/2021  3:03 PM EDT -----  Regarding: Colonoscopy  04/21/21 3:03 PM    Hello, our patient Randall Poon has had CRC: Colonoscopy completed/performed   Please assist in updating the patient chart by making an External outreach to Dr Anthony Lee located in [facility] date of service is 11/2020    Thank you,  Susan [facility] Pinky Benitez

## 2021-04-23 NOTE — TELEPHONE ENCOUNTER
Upon review of the In Basket request and the patient's chart, initial outreach has been made via fax, please see Contacts section for details       Thank you  Dom Chirinos

## 2021-04-27 ENCOUNTER — OFFICE VISIT (OUTPATIENT)
Dept: INTERNAL MEDICINE CLINIC | Facility: CLINIC | Age: 56
End: 2021-04-27
Payer: COMMERCIAL

## 2021-04-27 ENCOUNTER — HOSPITAL ENCOUNTER (EMERGENCY)
Facility: HOSPITAL | Age: 56
Discharge: HOME | End: 2021-04-27
Attending: EMERGENCY MEDICINE
Payer: COMMERCIAL

## 2021-04-27 VITALS
HEART RATE: 97 BPM | WEIGHT: 268.52 LBS | DIASTOLIC BLOOD PRESSURE: 67 MMHG | SYSTOLIC BLOOD PRESSURE: 149 MMHG | BODY MASS INDEX: 37.45 KG/M2 | TEMPERATURE: 98.1 F | OXYGEN SATURATION: 97 % | RESPIRATION RATE: 18 BRPM

## 2021-04-27 VITALS
RESPIRATION RATE: 16 BRPM | SYSTOLIC BLOOD PRESSURE: 126 MMHG | BODY MASS INDEX: 38.64 KG/M2 | WEIGHT: 276 LBS | HEART RATE: 98 BPM | HEIGHT: 71 IN | OXYGEN SATURATION: 99 % | DIASTOLIC BLOOD PRESSURE: 84 MMHG | TEMPERATURE: 98 F

## 2021-04-27 DIAGNOSIS — R04.0 RIGHT-SIDED NOSEBLEED: Primary | ICD-10-CM

## 2021-04-27 DIAGNOSIS — R04.0 LEFT-SIDED EPISTAXIS: Primary | ICD-10-CM

## 2021-04-27 LAB
HCT VFR BLD AUTO: 35.2 % (ref 34.8–46.1)
HGB BLD-MCNC: 11 G/DL (ref 11.5–15.4)

## 2021-04-27 PROCEDURE — 85018 HEMOGLOBIN: CPT | Performed by: PHYSICIAN ASSISTANT

## 2021-04-27 PROCEDURE — 36415 COLL VENOUS BLD VENIPUNCTURE: CPT | Performed by: PHYSICIAN ASSISTANT

## 2021-04-27 PROCEDURE — 99283 EMERGENCY DEPT VISIT LOW MDM: CPT

## 2021-04-27 PROCEDURE — 99213 OFFICE O/P EST LOW 20 MIN: CPT | Performed by: INTERNAL MEDICINE

## 2021-04-27 PROCEDURE — 3725F SCREEN DEPRESSION PERFORMED: CPT | Performed by: INTERNAL MEDICINE

## 2021-04-27 PROCEDURE — 85014 HEMATOCRIT: CPT | Performed by: PHYSICIAN ASSISTANT

## 2021-04-27 PROCEDURE — 3008F BODY MASS INDEX DOCD: CPT | Performed by: INTERNAL MEDICINE

## 2021-04-27 PROCEDURE — 99284 EMERGENCY DEPT VISIT MOD MDM: CPT | Performed by: PHYSICIAN ASSISTANT

## 2021-04-27 PROCEDURE — 1036F TOBACCO NON-USER: CPT | Performed by: INTERNAL MEDICINE

## 2021-04-27 RX ORDER — OXYMETAZOLINE HYDROCHLORIDE 0.05 G/100ML
2 SPRAY NASAL ONCE
Status: COMPLETED | OUTPATIENT
Start: 2021-04-27 | End: 2021-04-27

## 2021-04-27 RX ORDER — CEPHALEXIN 500 MG/1
CAPSULE ORAL
COMMUNITY
Start: 2021-04-26 | End: 2021-04-27

## 2021-04-27 RX ORDER — TRANEXAMIC ACID 100 MG/ML
1000 INJECTION, SOLUTION INTRAVENOUS ONCE
Status: COMPLETED | OUTPATIENT
Start: 2021-04-27 | End: 2021-04-27

## 2021-04-27 RX ADMIN — OXYMETAZOLINE HYDROCHLORIDE 2 SPRAY: 0.05 SPRAY NASAL at 17:48

## 2021-04-27 RX ADMIN — TRANEXAMIC ACID 1000 MG: 1 INJECTION, SOLUTION INTRAVENOUS at 17:48

## 2021-04-27 NOTE — PROGRESS NOTES
Assessment/Plan:    Presenting with nosebleeds from right and left nostril  Been on and off for 1 week  No triggers  Usually comes on at night  ? Allergies  Will contact Dr Mita Yates from ENT for recommendations  Referral for ENT ordered  No fever no chills  Marco (PA) here at the group has been able to schedule an appointment with ENT for today  No problem-specific Assessment & Plan notes found for this encounter  Diagnoses and all orders for this visit:    Right-sided nosebleed  -     Ambulatory Referral to Otolaryngology; Future        Subjective:      Patient ID: Pro Baxter is a 60 y o  female  Presenting with nosebleeds from right and left nostril  Been on and off for 1 week  No triggers  Usually comes on at night  Denies fever chills  No cp no sob  The following portions of the patient's history were reviewed and updated as appropriate: allergies, current medications, past family history, past medical history, past social history, past surgical history and problem list     Review of Systems   HENT: Positive for nosebleeds  All other systems reviewed and are negative  Objective:      /84 (BP Location: Right arm, Patient Position: Sitting, Cuff Size: Standard)   Pulse 98   Temp 98 °F (36 7 °C) (Tympanic)   Resp 16   Ht 5' 11" (1 803 m)   Wt 125 kg (276 lb)   LMP 01/01/2015   SpO2 99%   BMI 38 49 kg/m²          Physical Exam  Vitals signs and nursing note reviewed  Constitutional:       Appearance: Normal appearance  HENT:      Nose:      Comments: Evidence of right and left cauterization  Cardiovascular:      Rate and Rhythm: Normal rate  Pulmonary:      Effort: Pulmonary effort is normal    Neurological:      General: No focal deficit present  Mental Status: She is alert and oriented to person, place, and time     Psychiatric:         Mood and Affect: Mood normal          Behavior: Behavior normal

## 2021-04-27 NOTE — ED PROVIDER NOTES
History  Chief Complaint   Patient presents with    Nose Bleed     pt brought in by EMS for recurrent nosebleed, pt was seen her for the same earlier this morning     61 yo female with epistaxis  On and off for one week  Left nostril  Seen at ENT earlier today and had packing placed  About 2 hours ago started bleeding around the packing  Also noticed blood from right nostril  The bleeding now seems to have stopped  She denies lightheadedness, dizziness, n/v, chest pain or sob  She is not on anticoagulants or antiplatelets  History provided by:  Patient   used: No    Nose Bleed  Location:  L nare  Severity:  Moderate  Duration:  2 hours  Timing:  Constant  Progression:  Unchanged  Chronicity:  Recurrent  Context: not anticoagulants, not aspirin use, not BiPAP, not bleeding disorder, not CPAP, not drug use, not elevation change, not foreign body, not home oxygen, not hypertension, not nose picking, not recent infection, not thrombocytopenia, not trauma and not weather change    Relieved by:  Nothing  Worsened by:  Nothing  Ineffective treatments:  None tried  Associated symptoms: no blood in oropharynx, no congestion, no cough, no dizziness, no facial pain, no fever, no headaches, no sinus pain, no sneezing, no sore throat and no syncope        Prior to Admission Medications   Prescriptions Last Dose Informant Patient Reported? Taking? HYDROcodone-acetaminophen (NORCO) 5-325 mg per tablet  Self Yes No   Sig: Take 1 tablet by mouth every 6 (six) hours as needed     amitriptyline (ELAVIL) 25 mg tablet  Self No No   Sig: Take 2 tablets (50 mg total) by mouth daily at bedtime   cefuroxime (CEFTIN) 250 mg tablet   No No   Sig: Take 1 tablet (250 mg total) by mouth every 12 (twelve) hours for 5 days   eletriptan (RELPAX) 40 MG tablet  Self No No   Sig: Take 1 tablet at onset of migraine, May repeat once after 2 hours  Max 2 doses per 24 hours   BRAND NAME      Facility-Administered Medications: None       Past Medical History:   Diagnosis Date    Asymptomatic postmenopausal status     Back pain, lumbosacral     Cellulitis of left lower extremity     last assessed    3/30/16    resolved  Margarette Blend   16      Cluster headaches     Colon polyps     Dense breasts     Foot pain, left     Head injury     Dec 2017 no LOC    Hip pain, right     Lumbosacral pain     Lymph edema     Right hip pain     Spider veins     Tendinitis of right shoulder     Varicose veins of legs        Past Surgical History:   Procedure Laterality Date    BACK SURGERY      type unknown- occurred during vaginal delivery    BREAST BIOPSY Left     HAND TENDON SURGERY      MN REVISE MEDIAN N/CARPAL TUNNEL SURG Right 2016    Procedure: WRIST OPEN CARPAL TUNNEL RELEASE ;  Surgeon: John Fitzgerald MD;  [facility];  Service: Orthopedics    TOTAL HIP ARTHROPLASTY Right 2020    TUBAL LIGATION      VEIN LIGATION AND STRIPPING Left        Family History   Problem Relation Age of Onset   Francine Rajan Sudden death Mother         MVA hit by a drunk     Varicose Veins Father     Heart disease Father         cardiac disorder    Breast cancer Sister         neoplasm    Other Maternal Grandmother         gyn problem    Cervical cancer Maternal Grandmother     Breast cancer Maternal Aunt         neoplasm    Migraines Daughter     Cancer Sister         Appendix    Colon cancer Neg Hx     Ovarian cancer Neg Hx     Uterine cancer Neg Hx      I have reviewed and agree with the history as documented      E-Cigarette/Vaping    E-Cigarette Use Never User      E-Cigarette/Vaping Substances    Nicotine No     THC No     CBD No     Flavoring No     Other No     Unknown No      Social History     Tobacco Use    Smoking status: Former Smoker     Packs/day:      Quit date:      Years since quittin 3    Smokeless tobacco: Never Used   Substance Use Topics    Alcohol use: Not Currently     Comment: occassional    Drug use: No       Review of Systems   Constitutional: Negative for chills and fever  HENT: Positive for nosebleeds  Negative for congestion, ear pain, sinus pain, sneezing and sore throat  Eyes: Negative for pain and visual disturbance  Respiratory: Negative for cough and shortness of breath  Cardiovascular: Negative for chest pain, palpitations and syncope  Gastrointestinal: Negative for abdominal pain and vomiting  Genitourinary: Negative for dysuria and hematuria  Musculoskeletal: Negative for arthralgias and back pain  Skin: Negative for color change and rash  Neurological: Negative for dizziness, seizures, syncope and headaches  All other systems reviewed and are negative  Physical Exam  Physical Exam  Vitals signs and nursing note reviewed  Constitutional:       General: She is not in acute distress  Appearance: She is well-developed  HENT:      Head: Normocephalic and atraumatic  Nose:     Eyes:      Conjunctiva/sclera: Conjunctivae normal    Neck:      Musculoskeletal: Neck supple  Cardiovascular:      Rate and Rhythm: Normal rate and regular rhythm  Heart sounds: No murmur  Pulmonary:      Effort: Pulmonary effort is normal  No respiratory distress  Breath sounds: Normal breath sounds  Abdominal:      Palpations: Abdomen is soft  Tenderness: There is no abdominal tenderness  Skin:     General: Skin is warm and dry  Neurological:      Mental Status: She is alert           Vital Signs  ED Triage Vitals [04/27/21 1732]   Temperature Pulse Respirations Blood Pressure SpO2   98 1 °F (36 7 °C) 97 18 149/67 97 %      Temp Source Heart Rate Source Patient Position - Orthostatic VS BP Location FiO2 (%)   Oral Monitor Lying Right arm --      Pain Score       --           Vitals:    04/27/21 1732   BP: 149/67   Pulse: 97   Patient Position - Orthostatic VS: Lying         Visual Acuity      ED Medications  Medications   oxymetazoline (AFRIN) 0 05 % nasal spray 2 spray (2 sprays Each Nare Given 4/27/21 1748)   tranexamic acid 100mg/mL (for epistaxis) 1,000 mg (1,000 mg Nasal Given 4/27/21 1748)       Diagnostic Studies  Results Reviewed     Procedure Component Value Units Date/Time    Hemoglobin and hematocrit, blood [[id number]]  (Abnormal) Collected: 04/27/21 1749    Lab Status: Final result Specimen: Blood from Arm, Right Updated: 04/27/21 1800     Hemoglobin 11 0 g/dL      Hematocrit 35 2 %                  No orders to display              Procedures  Procedures         ED Course                             SBIRT 20yo+      Most Recent Value   SBIRT (24 yo +)   In order to provide better care to our patients, we are screening all of our patients for alcohol and drug use  Would it be okay to ask you these screening questions? Unable to answer at this time Filed at: 04/27/2021 1754                    MDM  Number of Diagnoses or Management Options  Left-sided epistaxis: new and requires workup  Diagnosis management comments: Differential diagnosis including but not limited to: anterior epistaxis, posterior epistaxis, anemia, bleeding diathesis, coagulopathy, hypertensive urgency  Plan: check hgb  Afrin b/l nostrils  Observe in ED       Amount and/or Complexity of Data Reviewed  Clinical lab tests: ordered and reviewed    Risk of Complications, Morbidity, and/or Mortality  Presenting problems: moderate  Management options: low  General comments: 61 yo with 1 week of epistaxis  hgb 11  Mild anemia  Hemostasis achieved upon arrival  Has not had any further bleeding since arriving to ED  I explained to pt that at this time it seems the packing is working  To remove the packing and replace with a larger packing may just cause recurrent bleeding, especially if the area of bleeding has clotted or tamponaded  She agrees  She would like to defer replacement of packing  She has an appointment with ENT to be seen again tomorrow   We discussed returning to ED between now and then if bleeding through packing  Return parameters provided  Pt understands and agrees with plan  Patient Progress  Patient progress: stable      Disposition  Final diagnoses:   Left-sided epistaxis     Time reflects when diagnosis was documented in both MDM as applicable and the Disposition within this note     Time User Action Codes Description Comment    4/27/2021  6:35 PM Iman Jaramillo Add [R04 0] Left-sided epistaxis       ED Disposition     ED Disposition Condition Date/Time Comment    Discharge Stable Tue Apr 27, 2021  6:35 PM Erasmo Loss discharge to home/self care  Follow-up Information     Follow up With Specialties Details Why Verenice Sutherland MD Otolaryngology Call  As needed [address] [facility] [facility] [place] [facility] [address] [facility]   [phone]            Discharge Medication List as of 4/27/2021  6:36 PM      CONTINUE these medications which have NOT CHANGED    Details   amitriptyline (ELAVIL) 25 mg tablet Take 2 tablets (50 mg total) by mouth daily at bedtime, Starting Fri 9/11/2020, Normal      cefuroxime (CEFTIN) 250 mg tablet Take 1 tablet (250 mg total) by mouth every 12 (twelve) hours for 5 days, Starting Tue 4/27/2021, Until Sun 5/2/2021, Normal      eletriptan (RELPAX) 40 MG tablet Take 1 tablet at onset of migraine, May repeat once after 2 hours  Max 2 doses per 24 hours  BRAND NAME, Normal      HYDROcodone-acetaminophen (NORCO) 5-325 mg per tablet Take 1 tablet by mouth every 6 (six) hours as needed  , Starting Wed 11/2/2016, Historical Med           No discharge procedures on file      PDMP Review       Value Time User    PDMP Reviewed  Yes 1/24/2020  1:53 PM Chula Jennings MD          ED Provider  Electronically Signed by           Armando Lin PA-C  04/27/21 4671

## 2021-04-27 NOTE — TELEPHONE ENCOUNTER
Upon review of the In Basket request we were able to locate, review, and update the patient chart as requested for CRC: Colonoscopy  Any additional questions or concerns should be emailed to the Practice Liaisons via [email or web address] email, please do not reply via In Basket      Thank you  Víctor Aguilar

## 2021-04-28 ENCOUNTER — OFFICE VISIT (OUTPATIENT)
Dept: LAB | Facility: CLINIC | Age: 56
End: 2021-04-28
Payer: COMMERCIAL

## 2021-04-28 DIAGNOSIS — Z01.818 PRE-OP TESTING: ICD-10-CM

## 2021-04-28 LAB
ATRIAL RATE: 105 BPM
P AXIS: 53 DEGREES
PR INTERVAL: 144 MS
QRS AXIS: -35 DEGREES
QRSD INTERVAL: 90 MS
QT INTERVAL: 328 MS
QTC INTERVAL: 433 MS
T WAVE AXIS: 60 DEGREES
VENTRICULAR RATE: 105 BPM

## 2021-04-28 PROCEDURE — 93010 ELECTROCARDIOGRAM REPORT: CPT | Performed by: INTERNAL MEDICINE

## 2021-04-28 PROCEDURE — 93005 ELECTROCARDIOGRAM TRACING: CPT

## 2021-04-29 ENCOUNTER — TELEPHONE (OUTPATIENT)
Dept: NEUROLOGY | Facility: CLINIC | Age: 56
End: 2021-04-29

## 2021-04-29 DIAGNOSIS — G44.011 INTRACTABLE EPISODIC CLUSTER HEADACHE: Primary | ICD-10-CM

## 2021-04-29 DIAGNOSIS — G43.009 MIGRAINE WITHOUT AURA AND WITHOUT STATUS MIGRAINOSUS, NOT INTRACTABLE: ICD-10-CM

## 2021-04-29 RX ORDER — ELETRIPTAN HYDROBROMIDE 40 MG/1
40 TABLET, FILM COATED ORAL ONCE AS NEEDED
Qty: 6 TABLET | Refills: 6 | Status: SHIPPED | OUTPATIENT
Start: 2021-04-29 | End: 2022-05-19 | Stop reason: SDUPTHER

## 2021-04-29 RX ORDER — DEXAMETHASONE 2 MG/1
2 TABLET ORAL
Qty: 5 TABLET | Refills: 0 | Status: SHIPPED | OUTPATIENT
Start: 2021-04-29 | End: 2021-05-04

## 2021-04-29 NOTE — TELEPHONE ENCOUNTER
I spoke to Dr Elroy Walters, she will send prescription for relpax, no medication interaction noted with hydrocodone  If not effective for relief of symptoms use steroids, also will send to pharmacy  Patient aware  She has f/u visit with ENT surgeon tomorrow  Pharmacy is [address]

## 2021-04-29 NOTE — TELEPHONE ENCOUNTER
Received  asking to talk to Shadia 40, cb [phone]  Had surgery today for nosebleeds  Had cluster headache for 2 5 hours after surgery, having pain in teeth with slight pain in right side of head/eye, "4/10" and has not had headache all winter but now has after surgery  She said she called surgeon office but was told not to  take percocet with relpax  Surgeon also prescribed cefuroxime  She never filled percocet but took hydro codone one tablet 3 PM instead as she had it at home  Please provide recommendation on relpax  Thank you  cb is [phone]

## 2021-05-18 NOTE — TELEPHONE ENCOUNTER
Called and spoke to pt - she is scheduled and confirmed for 2/25/22 at 12:30 in [facility] location

## 2021-09-27 ENCOUNTER — OFFICE VISIT (OUTPATIENT)
Dept: URGENT CARE | Facility: CLINIC | Age: 56
End: 2021-09-27
Payer: COMMERCIAL

## 2021-09-27 VITALS
WEIGHT: 280.2 LBS | HEART RATE: 92 BPM | TEMPERATURE: 99.1 F | BODY MASS INDEX: 39.23 KG/M2 | SYSTOLIC BLOOD PRESSURE: 138 MMHG | HEIGHT: 71 IN | RESPIRATION RATE: 18 BRPM | OXYGEN SATURATION: 98 % | DIASTOLIC BLOOD PRESSURE: 74 MMHG

## 2021-09-27 DIAGNOSIS — R09.82 POSTNASAL DRIP: Primary | ICD-10-CM

## 2021-09-27 DIAGNOSIS — J02.9 SORE THROAT: ICD-10-CM

## 2021-09-27 LAB — S PYO AG THROAT QL: NEGATIVE

## 2021-09-27 PROCEDURE — 87070 CULTURE OTHR SPECIMN AEROBIC: CPT | Performed by: PHYSICIAN ASSISTANT

## 2021-09-27 PROCEDURE — 99213 OFFICE O/P EST LOW 20 MIN: CPT | Performed by: PHYSICIAN ASSISTANT

## 2021-09-27 PROCEDURE — 87880 STREP A ASSAY W/OPTIC: CPT | Performed by: PHYSICIAN ASSISTANT

## 2021-09-27 RX ORDER — NAPROXEN 500 MG/1
TABLET ORAL EVERY 12 HOURS
COMMUNITY
End: 2022-03-23 | Stop reason: ALTCHOICE

## 2021-09-29 LAB — BACTERIA THROAT CULT: NORMAL

## 2021-10-15 DIAGNOSIS — G44.029 CHRONIC CLUSTER HEADACHE, NOT INTRACTABLE: ICD-10-CM

## 2021-10-18 RX ORDER — AMITRIPTYLINE HYDROCHLORIDE 25 MG/1
TABLET, FILM COATED ORAL
Qty: 180 TABLET | Refills: 3 | Status: SHIPPED | OUTPATIENT
Start: 2021-10-18 | End: 2022-05-19 | Stop reason: SDUPTHER

## 2022-02-18 ENCOUNTER — TELEPHONE (OUTPATIENT)
Dept: NEUROLOGY | Facility: CLINIC | Age: 57
End: 2022-02-18

## 2022-02-18 NOTE — TELEPHONE ENCOUNTER
Called and spoke to patient - confirmed upcoming appointment with Dr Tea Vigil  Provided patient with apt date, time and location  Informed patient that check in is at least 15 minutes prior to apt time

## 2022-03-23 ENCOUNTER — ANNUAL EXAM (OUTPATIENT)
Dept: OBGYN CLINIC | Facility: CLINIC | Age: 57
End: 2022-03-23
Payer: COMMERCIAL

## 2022-03-23 VITALS
BODY MASS INDEX: 39.48 KG/M2 | SYSTOLIC BLOOD PRESSURE: 130 MMHG | DIASTOLIC BLOOD PRESSURE: 80 MMHG | WEIGHT: 282 LBS | HEIGHT: 71 IN

## 2022-03-23 DIAGNOSIS — Z78.0 ASYMPTOMATIC POSTMENOPAUSAL STATUS: ICD-10-CM

## 2022-03-23 DIAGNOSIS — Z12.31 SCREENING MAMMOGRAM FOR BREAST CANCER: ICD-10-CM

## 2022-03-23 DIAGNOSIS — Z01.419 ENCOUNTER FOR GYNECOLOGICAL EXAMINATION WITHOUT ABNORMAL FINDING: Primary | ICD-10-CM

## 2022-03-23 PROBLEM — G56.00 CARPAL TUNNEL SYNDROME: Status: ACTIVE | Noted: 2022-03-23

## 2022-03-23 PROBLEM — M75.20 BICEPS TENDINITIS: Status: ACTIVE | Noted: 2022-03-23

## 2022-03-23 PROBLEM — Z98.890 STATUS POST HIP SURGERY: Status: ACTIVE | Noted: 2020-08-24

## 2022-03-23 PROBLEM — I78.1 SPIDER ANGIOMA: Status: ACTIVE | Noted: 2022-03-23

## 2022-03-23 PROBLEM — M65.4 RADIAL STYLOID TENOSYNOVITIS: Status: ACTIVE | Noted: 2020-01-27

## 2022-03-23 PROBLEM — M16.11 DEGENERATIVE JOINT DISEASE OF RIGHT HIP: Status: ACTIVE | Noted: 2020-08-24

## 2022-03-23 PROBLEM — I83.899 BLEEDING FROM VARICOSE VEINS OF LOWER EXTREMITY: Status: ACTIVE | Noted: 2022-03-23

## 2022-03-23 PROBLEM — M19.019 OSTEOARTHRITIS OF ACROMIOCLAVICULAR JOINT: Status: ACTIVE | Noted: 2021-06-10

## 2022-03-23 PROBLEM — R94.31 ABNORMAL RESTING ECG FINDINGS: Status: ACTIVE | Noted: 2019-04-15

## 2022-03-23 PROBLEM — I87.309 CHRONIC PERIPHERAL VENOUS HYPERTENSION: Status: ACTIVE | Noted: 2022-03-23

## 2022-03-23 PROBLEM — L03.119 CELLULITIS OF LOWER EXTREMITY: Status: ACTIVE | Noted: 2022-03-23

## 2022-03-23 PROBLEM — M75.81 OTHER SHOULDER LESIONS, RIGHT SHOULDER: Status: ACTIVE | Noted: 2022-03-23

## 2022-03-23 PROCEDURE — 3008F BODY MASS INDEX DOCD: CPT | Performed by: NURSE PRACTITIONER

## 2022-03-23 PROCEDURE — 1036F TOBACCO NON-USER: CPT | Performed by: NURSE PRACTITIONER

## 2022-03-23 PROCEDURE — G0145 SCR C/V CYTO,THINLAYER,RESCR: HCPCS | Performed by: NURSE PRACTITIONER

## 2022-03-23 PROCEDURE — 99396 PREV VISIT EST AGE 40-64: CPT | Performed by: NURSE PRACTITIONER

## 2022-03-23 RX ORDER — COVID-19 ANTIGEN TEST
2 KIT MISCELLANEOUS AS NEEDED
COMMUNITY

## 2022-03-23 NOTE — PATIENT INSTRUCTIONS
Breast Self Exam for Women   AMBULATORY CARE:   A breast self-exam (BSE)  is a way to check your breasts for lumps and other changes  Regular BSEs can help you know how your breasts normally look and feel  Most breast lumps or changes are not cancer, but you should always have them checked by a healthcare provider  Why you should do a BSE:  Breast cancer is the most common type of cancer in women  Even if you have mammograms, you may still want to do a BSE regularly  If you know how your breasts normally feel and look, it may help you know when to contact your healthcare provider  Mammograms can miss some cancers  You may find a lump during a BSE that did not show up on a mammogram   When you should do a BSE:  If you have periods, you may want to do your BSE 1 week after your period ends  This is the time when your breasts may be the least swollen, lumpy, or tender  You can do regular BSEs even if you are breastfeeding or have breast implants  Call your doctor if:   · You find any lumps or changes in your breasts  · You have breast pain or fluid coming from your nipples  · You have questions or concerns about your condition or care  How to do a BSE:       · Look at your breasts in a mirror  Look at the size and shape of each breast and nipple  Check for swelling, lumps, dimpling, scaly skin, or other skin changes  Look for nipple changes, such as a nipple that is painful or beginning to pull inward  Gently squeeze both nipples and check to see if fluid (that is not breast milk) comes out of them  If you find any of these or other breast changes, contact your healthcare provider  Check your breasts while you sit or  the following 3 positions:    ? Hang your arms down at your sides  ? Raise your hands and join them behind your head  ? Put firm pressure with your hands on your hips  Bend slightly forward while you look at your breasts in the mirror  · Lie down and feel your breasts    When you lie down, your breast tissue spreads out evenly over your chest  This makes it easier for you to feel for lumps and anything that may not be normal for your breasts  Do a BSE on one breast at a time  ? Place a small pillow or towel under your left shoulder  Put your left arm behind your head  ? Use the 3 middle fingers of your right hand  Use your fingertip pads, on the top of your fingers  Your fingertip pad is the most sensitive part of your finger  ? Use small circles to feel your breast tissue  Use your fingertip pads to make dime-sized, overlapping circles on your breast and armpits  Use light, medium, and firm pressure  First, press lightly  Second, press with medium pressure to feel a little deeper into the breast  Last, use firm pressure to feel deep within your breast     ? Examine your entire breast area  Examine the breast area from above the breast to below the breast where you feel only ribs  Make small circles with your fingertips, starting in the middle of your armpit  Make circles going up and down the breast area  Continue toward your breast and all the way across it  Examine the area from your armpit all the way over to the middle of your chest (breastbone)  Stop at the middle of your chest     ? Move the pillow or towel to your right shoulder, and put your right arm behind your head  Use the 3 fingertip pads of your left hand, and repeat the above steps to do a BSE on your right breast     What else you can do to check for breast problems or cancer:  Talk to your healthcare provider about mammograms  A mammogram is an x-ray of your breasts to screen for breast cancer or other problems  Your provider can tell you the benefits and risks of mammograms  The first mammogram is usually at age 39 or 48  Your provider may recommend you start at 36 or younger if your risk for breast cancer is high  Mammograms usually continue every 1 to 2 years until age 76         Follow up with your doctor as directed:  Write down your questions so you remember to ask them during your visits  © Copyright [employer] 2022 Information is for End User's use only and may not be sold, redistributed or otherwise used for commercial purposes  All illustrations and images included in CareNotes® are the copyrighted property of [employer]  or [employer]  The above information is an  only  It is not intended as medical advice for individual conditions or treatments  Talk to your doctor, nurse or pharmacist before following any medical regimen to see if it is safe and effective for you

## 2022-03-23 NOTE — PROGRESS NOTES
Diagnoses and all orders for this visit:    Encounter for gynecological examination without abnormal finding  -     Liquid-based pap, screening    Asymptomatic postmenopausal status    Screening mammogram for breast cancer  -     Mammo screening bilateral w 3d & cad; Future        Call as needed, encouraged calcium/vit D in her diet, call with any PMB, all questions answered  Pleasant 60 y o  postmenopausal female here for annual exam  She denies postmenopausal bleeding  Denies history of abnormal pap smears, last Pap NIL neg 2021, HPV neg in 2020, REQUESTS YEARLY PAPS, pap done  today  Denies vaginal issues  Denies pelvic pain  Denies postmenopausal issues  Sexually active without any issues  Colonoscopy due again in 2023 by Dr Bonifacio Ellis  Stopped smoking 2020  Past Medical History:   Diagnosis Date    Asymptomatic postmenopausal status     Back pain, lumbosacral     Cellulitis of left lower extremity     last assessed    3/30/16    resolved  Odell Po   9/8/16      Cluster headaches     Colon polyps     Dense breasts     Foot pain, left     Head injury     Dec 2017 no LOC    Hip pain, right     Lumbosacral pain     Lymph edema     Nosebleed     Right hip pain     Spider veins     Tendinitis of right shoulder     Varicose veins of legs      Past Surgical History:   Procedure Laterality Date    BACK SURGERY      type unknown- occurred during vaginal delivery    BREAST BIOPSY Left     HAND TENDON SURGERY      MS REVISE MEDIAN N/CARPAL TUNNEL SURG Right 9/12/2016    Procedure: WRIST OPEN CARPAL TUNNEL RELEASE ;  Surgeon: Shana Aguila MD;  [facility];  Service: Orthopedics    SINUS SURGERY      TOTAL HIP ARTHROPLASTY Right 08/24/2020    TUBAL LIGATION      VEIN LIGATION AND STRIPPING Left      Family History   Problem Relation Age of Onset   Clifm Nura Sudden death Mother         MVA hit by a drunk     Varicose Veins Father     Heart disease Father         cardiac disorder    Breast cancer Sister         neoplasm    Other Maternal Grandmother         gyn problem    Cervical cancer Maternal Grandmother     Breast cancer Maternal Aunt         neoplasm    Migraines Daughter     Cancer Sister         Appendix    Colon cancer Neg Hx     Ovarian cancer Neg Hx     Uterine cancer Neg Hx      Social History     Tobacco Use    Smoking status: Former Smoker     Packs/day: 1 00     Quit date:      Years since quittin 2    Smokeless tobacco: Never Used   Vaping Use    Vaping Use: Never used   Substance Use Topics    Alcohol use: Yes     Comment: rare    Drug use: No       Current Outpatient Medications:     amitriptyline (ELAVIL) 25 mg tablet, TAKE 2 TABLETS DAILY AT BEDTIME, Disp: 180 tablet, Rfl: 3    eletriptan (Relpax) 40 MG tablet, Take 1 tablet (40 mg total) by mouth once as needed for migraine may repeat in 2 hours if necessary  Max 2 doses per 24 hours   dispense as written, Disp: 6 tablet, Rfl: 6    Naproxen Sodium (Aleve) 220 MG CAPS, Take by mouth, Disp: , Rfl:     HYDROcodone-acetaminophen (NORCO) 5-325 mg per tablet, Take 1 tablet by mouth every 6 (six) hours as needed  , Disp: , Rfl:   Patient Active Problem List    Diagnosis Date Noted    Carpal tunnel syndrome 2022    Chronic peripheral venous hypertension 2022    Other shoulder lesions, right shoulder 2022    Biceps tendinitis 2022    Cellulitis of lower extremity 2022    Spider angioma 2022    Bleeding from varicose veins of lower extremity 2022    Osteoarthritis of acromioclavicular joint 06/10/2021    Degenerative joint disease of right hip 2020    Status post hip surgery 2020    Radial styloid tenosynovitis 2020    Abnormal resting ECG findings 04/15/2019    Asymptomatic postmenopausal status 2019    Cluster headaches 2018    Varicose veins of legs 2018    Thrombophlebitis of superficial veins of right lower extremity 2018    Chronic venous insufficiency 2018    Lymphedema 2018    Dense breasts 02/15/2017    Back pain, lumbosacral 2016       No Known Allergies    OB History    Para Term  AB Living   2 2 2     2   SAB IAB Ectopic Multiple Live Births           2      # Outcome Date GA Lbr Samuel/2nd Weight Sex Delivery Anes PTL Lv   2 Term            1 Term              Works in [employer] part time- thinking of retiring  2 grown son and daughter, Living back at home, no grands yet    Vitals:    22 1320   BP: 130/80   BP Location: Left arm   Patient Position: Sitting   Cuff Size: Standard   Weight: 128 kg (282 lb)   Height: 5' 11" (1 803 m)     Body mass index is 39 33 kg/m²  Review of Systems   Constitutional: Negative for chills, fatigue, fever and unexpected weight change  Respiratory: Negative for shortness of breath  Gastrointestinal: Negative for anal bleeding, blood in stool and diarrhea  +Occasional constipation  Genitourinary: Negative for difficulty urinating, dysuria and hematuria  Physical Exam   Constitutional: She appears well-developed and well-nourished  No distress  HENT: atraumatic, EOMI  Head: Normocephalic  Neck: Normal range of motion  Neck supple  Pulmonary: Effort normal   Breasts: bilateral without masses, skin changes or nipple discharge  Bilaterally soft and warm to touch  No areas of erythema or pain  Abdominal: Soft  Pelvic exam was performed with patient supine  No labial fusion  There is no rash, tenderness or injury on the right labia  2 Right labial inclusion cysts noted  There is no rash, tenderness, lesion or injury on the left labia  Urethral meatus does not show any tenderness, inflammation or discharge  Palpation of midline bladder without pain or discomfort  Uterus is not deviated, not enlarged, not fixed and not tender  Cervix exhibits no motion tenderness, no discharge and no friability   Right adnexum displays no mass, no tenderness and no fullness  Left adnexum displays no mass, no tenderness and no fullness  No erythema or tenderness in the vagina  No foreign body in the vagina  No signs of injury around the vagina or anus  Perineum without lesions, signs of injury, erythema or swelling  No vaginal discharge found  Lymphadenopathy:        Right: No inguinal adenopathy present  Left: No inguinal adenopathy present

## 2022-03-29 LAB
LAB AP GYN PRIMARY INTERPRETATION: NORMAL
Lab: NORMAL

## 2022-04-27 ENCOUNTER — OFFICE VISIT (OUTPATIENT)
Dept: INTERNAL MEDICINE CLINIC | Facility: CLINIC | Age: 57
End: 2022-04-27
Payer: COMMERCIAL

## 2022-04-27 VITALS
WEIGHT: 282.8 LBS | TEMPERATURE: 98 F | BODY MASS INDEX: 39.59 KG/M2 | DIASTOLIC BLOOD PRESSURE: 68 MMHG | OXYGEN SATURATION: 99 % | SYSTOLIC BLOOD PRESSURE: 128 MMHG | HEIGHT: 71 IN | HEART RATE: 96 BPM

## 2022-04-27 DIAGNOSIS — Z00.00 ANNUAL PHYSICAL EXAM: Primary | ICD-10-CM

## 2022-04-27 DIAGNOSIS — I89.0 LYMPHEDEMA: ICD-10-CM

## 2022-04-27 DIAGNOSIS — R63.5 WEIGHT GAIN: ICD-10-CM

## 2022-04-27 DIAGNOSIS — I87.2 CHRONIC VENOUS INSUFFICIENCY: ICD-10-CM

## 2022-04-27 DIAGNOSIS — Z13.6 SCREENING FOR HEART DISEASE: ICD-10-CM

## 2022-04-27 DIAGNOSIS — R06.00 DYSPNEA ON EXERTION: ICD-10-CM

## 2022-04-27 PROCEDURE — 99396 PREV VISIT EST AGE 40-64: CPT | Performed by: PHYSICIAN ASSISTANT

## 2022-04-27 PROCEDURE — 3725F SCREEN DEPRESSION PERFORMED: CPT | Performed by: PHYSICIAN ASSISTANT

## 2022-04-27 PROCEDURE — 3008F BODY MASS INDEX DOCD: CPT | Performed by: PHYSICIAN ASSISTANT

## 2022-04-27 PROCEDURE — 1036F TOBACCO NON-USER: CPT | Performed by: PHYSICIAN ASSISTANT

## 2022-04-27 NOTE — PATIENT INSTRUCTIONS
Other than chronic lymphedema and chronic venous insufficiency with lower leg varicose veins, patient's general physical is good  We will have her do screening labs  Will discuss results with her when available  Will refer her to lymphedema clinic for any further help  Recommend follow-up in 1 year, sooner as needed

## 2022-04-27 NOTE — PROGRESS NOTES
Assessment/Plan:   Patient Instructions    Other than chronic lymphedema and chronic venous insufficiency with lower leg varicose veins, patient's general physical is good  We will have her do screening labs  Will discuss results with her when available  Will refer her to lymphedema clinic for any further help  Recommend follow-up in 1 year, sooner as needed  Will also refer for PFTs  Quality Measures: BMI Counseling: Body mass index is 39 44 kg/m²  The BMI is above normal  Nutrition recommendations include decreasing portion sizes, encouraging healthy choices of fruits and vegetables, consuming healthier snacks, moderation in carbohydrate intake and reducing intake of cholesterol  Rationale for BMI follow-up plan is due to patient being overweight or obese  Depression Screening and Follow-up Plan: Patient was screened for depression during today's encounter  They screened negative with a PHQ-2 score of 0  Return in about 1 year (around 4/27/2023) for Annual physical          Diagnoses and all orders for this visit:    Annual physical exam    Chronic venous insufficiency    Lymphedema  -     CBC and differential; Future  -     Comprehensive metabolic panel; Future  -     Ambulatory Referral to PT/OT Lymphedema Therapy; Future    Screening for heart disease  -     Lipid panel; Future    Weight gain  -     T4, free; Future  -     TSH, 3rd generation; Future    Dyspnea on exertion  -     Complete PFT with post bronchodilator; Future          Subjective:      Patient ID: Fabby Ramos is a 60 y o  female  69-year-old female presents for her annual physical   Other than her legs being swollen from lymphedema, painful superficial varicosities especially when on her feet for prolonged periods (she works at the [employer]) she is feeling well  Patient did however admit that ever since having COVID and being a former smoker she has noted shortness of breath on exertion    For example climbing 1 flight of stairs  She is not experiencing any chest pain or palpitations  No mid back pain  No cough, fever or chills  Patient also stating that her cluster headaches have disappeared  Neurology not sure if this is related to stopping smoking or having gone through menopause  EMR has been reviewed, clarified, and updated with patient today  ALLERGIES:  No Known Allergies    CURRENT MEDICATIONS:    Current Outpatient Medications:     amitriptyline (ELAVIL) 25 mg tablet, TAKE 2 TABLETS DAILY AT BEDTIME, Disp: 180 tablet, Rfl: 3    eletriptan (Relpax) 40 MG tablet, Take 1 tablet (40 mg total) by mouth once as needed for migraine may repeat in 2 hours if necessary  Max 2 doses per 24 hours  dispense as written, Disp: 6 tablet, Rfl: 6    HYDROcodone-acetaminophen (NORCO) 5-325 mg per tablet, Take 1 tablet by mouth every 6 (six) hours as needed  , Disp: , Rfl:     Naproxen Sodium (Aleve) 220 MG CAPS, Take by mouth, Disp: , Rfl:     ACTIVE PROBLEM LIST:  Patient Active Problem List   Diagnosis    Thrombophlebitis of superficial veins of right lower extremity    Chronic venous insufficiency    Lymphedema    Back pain, lumbosacral    Dense breasts    Varicose veins of legs    Cluster headaches    Asymptomatic postmenopausal status    Abnormal resting ECG findings    Carpal tunnel syndrome    Chronic peripheral venous hypertension    Degenerative joint disease of right hip    Radial styloid tenosynovitis    Other shoulder lesions, right shoulder    Biceps tendinitis    Osteoarthritis of acromioclavicular joint    Cellulitis of lower extremity    Spider angioma    Status post hip surgery    Bleeding from varicose veins of lower extremity       PAST MEDICAL HISTORY:  Past Medical History:   Diagnosis Date    Asymptomatic postmenopausal status     Back pain, lumbosacral     Cellulitis of left lower extremity     last assessed    3/30/16    resolved  Nataly Deleon   9/8/16      Cluster headaches     Colon polyps     Dense breasts     Foot pain, left     Head injury     Dec 2017 no LOC    Hip pain, right     Lumbosacral pain     Lymph edema     Nosebleed     Right hip pain     Spider veins     Tendinitis of right shoulder     Varicose veins of legs        PAST SURGICAL HISTORY:  Past Surgical History:   Procedure Laterality Date    BACK SURGERY      type unknown- occurred during vaginal delivery    BREAST BIOPSY Left     HAND TENDON SURGERY      TN REVISE MEDIAN N/CARPAL TUNNEL SURG Right 2016    Procedure: WRIST OPEN CARPAL TUNNEL RELEASE ;  Surgeon: Keri Truong MD;  [facility];  Service: Orthopedics    SINUS SURGERY      TOTAL HIP ARTHROPLASTY Right 2020    TUBAL LIGATION      VEIN LIGATION AND STRIPPING Left        FAMILY HISTORY:  Family History   Problem Relation Age of Onset   Terese Power Sudden death Mother         MVA hit by a drunk     Varicose Veins Father     Heart disease Father         cardiac disorder    Breast cancer Sister         neoplasm    Other Maternal Grandmother         gyn problem    Cervical cancer Maternal Grandmother     Breast cancer Maternal Aunt         neoplasm    Migraines Daughter     Cancer Sister         Appendix    Colon cancer Neg Hx     Ovarian cancer Neg Hx     Uterine cancer Neg Hx        SOCIAL HISTORY:  Social History     Socioeconomic History    Marital status: /Civil Union     Spouse name: Not on file    Number of children: 2    Years of education: Not on file    Highest education level: Not on file   Occupational History     Comment: full time - post office   Tobacco Use    Smoking status: Former Smoker     Packs/day: 1 00     Quit date:      Years since quittin 3    Smokeless tobacco: Never Used   Vaping Use    Vaping Use: Never used   Substance and Sexual Activity    Alcohol use: Yes     Comment: rare    Drug use: No    Sexual activity: Yes     Birth control/protection: Surgical, Female Sterilization   Other Topics Concern    Not on file   Social History Narrative    Brushes teeth twice a day    Dental care regularly        Two children     Social Determinants of Health     Financial Resource Strain: Not on file   Food Insecurity: Not on file   Transportation Needs: Not on file   Physical Activity: Not on file   Stress: Not on file   Social Connections: Not on file   Intimate Partner Violence: Not on file   Housing Stability: Not on file       Review of Systems   Constitutional: Negative for activity change, chills, fatigue and fever  HENT: Negative for congestion  Eyes: Negative for discharge  Respiratory: Negative for cough, chest tightness and shortness of breath  Cardiovascular: Positive for leg swelling  Negative for chest pain and palpitations  Gastrointestinal: Negative for abdominal pain, blood in stool, constipation, diarrhea, nausea and vomiting  Endocrine: Negative for polydipsia, polyphagia and polyuria  Genitourinary: Negative for difficulty urinating  Musculoskeletal: Positive for arthralgias  Negative for myalgias  Skin: Negative for rash  Allergic/Immunologic: Negative for immunocompromised state  Neurological: Negative for dizziness, syncope, weakness, light-headedness and headaches  Hematological: Negative for adenopathy  Does not bruise/bleed easily  Psychiatric/Behavioral: Negative for dysphoric mood, sleep disturbance and suicidal ideas  The patient is not nervous/anxious  Objective:  Vitals:    04/27/22 1338   BP: 128/68   BP Location: Left arm   Patient Position: Sitting   Cuff Size: Large   Pulse: 96   Temp: 98 °F (36 7 °C)   TempSrc: Tympanic   SpO2: 99%   Weight: 128 kg (282 lb 12 8 oz)   Height: 5' 11" (1 803 m)     Body mass index is 39 44 kg/m²  Physical Exam  Vitals and nursing note reviewed  Constitutional:       General: She is not in acute distress  Appearance: She is well-developed  She is obese   She is not ill-appearing  HENT:      Head: Normocephalic and atraumatic  Right Ear: Tympanic membrane, ear canal and external ear normal       Left Ear: Tympanic membrane, ear canal and external ear normal       Nose: Nose normal       Mouth/Throat:      Mouth: Mucous membranes are moist       Pharynx: Oropharynx is clear  Eyes:      General: No scleral icterus  Right eye: No discharge  Left eye: No discharge  Extraocular Movements: Extraocular movements intact  Conjunctiva/sclera: Conjunctivae normal       Pupils: Pupils are equal, round, and reactive to light  Neck:      Thyroid: No thyromegaly  Vascular: No carotid bruit  Cardiovascular:      Rate and Rhythm: Normal rate and regular rhythm  Pulses: Normal pulses  Heart sounds: Normal heart sounds  No murmur heard  Pulmonary:      Effort: Pulmonary effort is normal  No respiratory distress  Breath sounds: Normal breath sounds  Chest:      Chest wall: No tenderness  Abdominal:      General: Abdomen is flat  Bowel sounds are normal  There is no distension  Palpations: Abdomen is soft  There is no hepatomegaly, splenomegaly or mass  Tenderness: There is no abdominal tenderness  There is no right CVA tenderness, left CVA tenderness, guarding or rebound  Comments: Obese   Musculoskeletal:         General: No swelling  Normal range of motion  Cervical back: Normal range of motion and neck supple  Right lower leg: Edema (  Chronic lymphedema) present  Left lower leg: Edema ( chronic lymphedema) present  Comments: Multiple superficial varicosities of lower legs bilaterally especially posterior lower legs  Lymphadenopathy:      Cervical: No cervical adenopathy  Skin:     General: Skin is warm and dry  Findings: No rash  Neurological:      General: No focal deficit present  Mental Status: She is alert and oriented to person, place, and time  Cranial Nerves:  No cranial nerve deficit  Sensory: No sensory deficit  Motor: No weakness  Coordination: Coordination normal       Gait: Gait normal       Deep Tendon Reflexes: Reflexes are normal and symmetric  Reflexes normal    Psychiatric:         Mood and Affect: Mood normal          Behavior: Behavior normal          Thought Content: Thought content normal          Judgment: Judgment normal            RESULTS:    Recent Results (from the past 1008 hour(s))   Liquid-based pap, screening    Collection Time: 03/23/22  1:50 PM   Result Value Ref Range    Case Report       Gynecologic Cytology Report                       Case: [id number]                                  Authorizing Provider:  REINALDO Hathaway       Collected:           03/23/2022 [facility]              Ordering Location:     [facility]     Received:            03/23/2022 [facility]                                     [facility]                                                                               [place]                                                                       First Screen:          FRITZ Olivares                                                       Specimen:    LIQUID-BASED PAP, SCREENING, Cervix                                                        Primary Interpretation Negative for intraepithelial lesion or malignancy     Specimen Adequacy       Satisfactory for evaluation  Endocervical/transformation zone component present  Additional Information       [employer]'s FDA approved ,  and ThinPrep Imaging Duo System are utilized with strict adherence to the 's instruction manual to prepare gynecologic and non-gynecologic cytology specimens for the production of ThinPrep slides as well as for gynecologic ThinPrep imaging  These processes have been validated by our laboratory and/or by the     The Pap test is not a diagnostic procedure and should not be used as the sole means to detect cervical cancer  It is only a screening procedure to aid in the detection of cervical cancer and its precursors  Both false-negative and false-positive results have been experienced  Your patient's test result should be interpreted in this context together with the history and clinical findings  This note was created with voice recognition software  Phonic, grammatical and spelling errors may be present within the note as a result

## 2022-05-19 DIAGNOSIS — G44.029 CHRONIC CLUSTER HEADACHE, NOT INTRACTABLE: ICD-10-CM

## 2022-05-19 DIAGNOSIS — G44.011 INTRACTABLE EPISODIC CLUSTER HEADACHE: ICD-10-CM

## 2022-05-19 RX ORDER — AMITRIPTYLINE HYDROCHLORIDE 25 MG/1
50 TABLET, FILM COATED ORAL
Qty: 28 TABLET | Refills: 0 | Status: SHIPPED | OUTPATIENT
Start: 2022-05-19 | End: 2022-08-04 | Stop reason: SDUPTHER

## 2022-05-19 RX ORDER — ELETRIPTAN HYDROBROMIDE 40 MG/1
40 TABLET, FILM COATED ORAL ONCE AS NEEDED
Qty: 6 TABLET | Refills: 6 | Status: SHIPPED | OUTPATIENT
Start: 2022-05-19 | End: 2022-07-26 | Stop reason: SDUPTHER

## 2022-05-19 NOTE — TELEPHONE ENCOUNTER
Noticed that pt cancelled her f/u appt few months ago      Called and left a message on pt's answering machine for a call back to reschedule her appt

## 2022-05-19 NOTE — TELEPHONE ENCOUNTER
Pt called and states that she run out of her amitriptyline last week bec [facility] to [place]  She will be calling [facility] to have them ship to the correct address  Pt states that since she missed 1 week of amitriptyline, she started cluster HA yesterday  Pt is requesting 2 week supply of amitriptyline be sent to [facility] pharmacy on file and  [facility]riptan refill [facility]  Rxs entered   Pls review and sign off                        [phone] ok to leave detailed message

## 2022-06-16 ENCOUNTER — HOSPITAL ENCOUNTER (OUTPATIENT)
Dept: PULMONOLOGY | Facility: HOSPITAL | Age: 57
Discharge: HOME | End: 2022-06-16
Payer: COMMERCIAL

## 2022-06-16 DIAGNOSIS — R06.00 DYSPNEA ON EXERTION: ICD-10-CM

## 2022-06-16 PROCEDURE — 94729 DIFFUSING CAPACITY: CPT

## 2022-06-16 PROCEDURE — 94727 GAS DIL/WSHOT DETER LNG VOL: CPT

## 2022-06-16 PROCEDURE — 94060 EVALUATION OF WHEEZING: CPT

## 2022-06-16 PROCEDURE — 94760 N-INVAS EAR/PLS OXIMETRY 1: CPT

## 2022-06-16 PROCEDURE — 94729 DIFFUSING CAPACITY: CPT | Performed by: INTERNAL MEDICINE

## 2022-06-16 PROCEDURE — 94727 GAS DIL/WSHOT DETER LNG VOL: CPT | Performed by: INTERNAL MEDICINE

## 2022-06-16 PROCEDURE — 94060 EVALUATION OF WHEEZING: CPT | Performed by: INTERNAL MEDICINE

## 2022-06-16 RX ORDER — ALBUTEROL SULFATE 2.5 MG/3ML
2.5 SOLUTION RESPIRATORY (INHALATION) ONCE
Status: DISCONTINUED | OUTPATIENT
Start: 2022-06-16 | End: 2022-06-20 | Stop reason: HOSPADM

## 2022-06-21 ENCOUNTER — TELEPHONE (OUTPATIENT)
Dept: INTERNAL MEDICINE CLINIC | Facility: CLINIC | Age: 57
End: 2022-06-21

## 2022-06-21 DIAGNOSIS — R06.09 DYSPNEA ON EXERTION: Primary | ICD-10-CM

## 2022-06-21 DIAGNOSIS — R94.2 ABNORMAL PFTS: ICD-10-CM

## 2022-06-21 NOTE — TELEPHONE ENCOUNTER
----- Message from Yonas Naqvi PA-C sent at 6/21/2022  1:14 PM EDT -----    Would like to have you seen by Pulmonary to comment on your pulmonary function study  Not necessarily normal   With your new shortness of breath on exertion this would be wise  I have put in the referral into the EMR

## 2022-06-22 LAB
ALBUMIN SERPL-MCNC: 4.2 G/DL (ref 3.8–4.9)
ALBUMIN/GLOB SERPL: 1.4 {RATIO} (ref 1.2–2.2)
ALP SERPL-CCNC: 136 IU/L (ref 44–121)
ALT SERPL-CCNC: 25 IU/L (ref 0–32)
AST SERPL-CCNC: 20 IU/L (ref 0–40)
BASOPHILS # BLD AUTO: 0.1 X10E3/UL (ref 0–0.2)
BASOPHILS NFR BLD AUTO: 1 %
BILIRUB SERPL-MCNC: 0.3 MG/DL (ref 0–1.2)
BUN SERPL-MCNC: 13 MG/DL (ref 6–24)
BUN/CREAT SERPL: 19 (ref 9–23)
CALCIUM SERPL-MCNC: 9.5 MG/DL (ref 8.7–10.2)
CHLORIDE SERPL-SCNC: 102 MMOL/L (ref 96–106)
CHOLEST SERPL-MCNC: 190 MG/DL (ref 100–199)
CO2 SERPL-SCNC: 24 MMOL/L (ref 20–29)
CREAT SERPL-MCNC: 0.67 MG/DL (ref 0.57–1)
EGFR: 101 ML/MIN/1.73
EOSINOPHIL # BLD AUTO: 0.1 X10E3/UL (ref 0–0.4)
EOSINOPHIL NFR BLD AUTO: 1 %
ERYTHROCYTE [DISTWIDTH] IN BLOOD BY AUTOMATED COUNT: 14.2 % (ref 11.7–15.4)
GLOBULIN SER-MCNC: 2.9 G/DL (ref 1.5–4.5)
GLUCOSE SERPL-MCNC: 103 MG/DL (ref 65–99)
HCT VFR BLD AUTO: 38.9 % (ref 34–46.6)
HDLC SERPL-MCNC: 53 MG/DL
HGB BLD-MCNC: 12.5 G/DL (ref 11.1–15.9)
IMM GRANULOCYTES # BLD: 0 X10E3/UL (ref 0–0.1)
IMM GRANULOCYTES NFR BLD: 1 %
LDLC SERPL CALC-MCNC: 117 MG/DL (ref 0–99)
LYMPHOCYTES # BLD AUTO: 1.3 X10E3/UL (ref 0.7–3.1)
LYMPHOCYTES NFR BLD AUTO: 31 %
MCH RBC QN AUTO: 28.9 PG (ref 26.6–33)
MCHC RBC AUTO-ENTMCNC: 32.1 G/DL (ref 31.5–35.7)
MCV RBC AUTO: 90 FL (ref 79–97)
MONOCYTES # BLD AUTO: 0.3 X10E3/UL (ref 0.1–0.9)
MONOCYTES NFR BLD AUTO: 8 %
NEUTROPHILS # BLD AUTO: 2.5 X10E3/UL (ref 1.4–7)
NEUTROPHILS NFR BLD AUTO: 58 %
PLATELET # BLD AUTO: 258 X10E3/UL (ref 150–450)
POTASSIUM SERPL-SCNC: 4.4 MMOL/L (ref 3.5–5.2)
PROT SERPL-MCNC: 7.1 G/DL (ref 6–8.5)
RBC # BLD AUTO: 4.33 X10E6/UL (ref 3.77–5.28)
SL AMB VLDL CHOLESTEROL CALC: 20 MG/DL (ref 5–40)
SODIUM SERPL-SCNC: 141 MMOL/L (ref 134–144)
T4 FREE SERPL-MCNC: 1.16 NG/DL (ref 0.82–1.77)
TRIGL SERPL-MCNC: 110 MG/DL (ref 0–149)
TSH SERPL DL<=0.005 MIU/L-ACNC: 3.01 UIU/ML (ref 0.45–4.5)
WBC # BLD AUTO: 4.3 X10E3/UL (ref 3.4–10.8)

## 2022-07-22 ENCOUNTER — TELEPHONE (OUTPATIENT)
Dept: NEUROLOGY | Facility: CLINIC | Age: 57
End: 2022-07-22

## 2022-07-22 NOTE — TELEPHONE ENCOUNTER
pt left vm stating that she made an appt with dr Patino December but asking for a refill of elertriptan  per chart, [facility] with additional refills      [phone]    called pt and made her aware of above

## 2022-07-24 ENCOUNTER — PATIENT MESSAGE (OUTPATIENT)
Dept: NEUROLOGY | Facility: CLINIC | Age: 57
End: 2022-07-24

## 2022-07-25 NOTE — TELEPHONE ENCOUNTER
Received vm from patient stating she still cannot get refill   She asked for us to contact [facility]

## 2022-07-26 ENCOUNTER — TELEPHONE (OUTPATIENT)
Dept: NEUROLOGY | Facility: CLINIC | Age: 57
End: 2022-07-26

## 2022-07-26 DIAGNOSIS — G44.011 INTRACTABLE EPISODIC CLUSTER HEADACHE: ICD-10-CM

## 2022-07-26 RX ORDER — GALCANEZUMAB 100 MG/ML
300 INJECTION, SOLUTION SUBCUTANEOUS
Qty: 3 ML | Refills: 3 | Status: SHIPPED | OUTPATIENT
Start: 2022-07-26

## 2022-07-26 RX ORDER — ELETRIPTAN HYDROBROMIDE 40 MG/1
40 TABLET, FILM COATED ORAL ONCE AS NEEDED
Qty: 6 TABLET | Refills: 6 | Status: SHIPPED | OUTPATIENT
Start: 2022-07-26 | End: 2022-07-28 | Stop reason: SDUPTHER

## 2022-07-26 NOTE — PROGRESS NOTES
Called her immediately because this is the first I heard she was in a cluster cycle when I was forwarded her message from 7/24/22. She reports cluster started last week mildly, last night was the worst one.  - relpax helps and last two nights did not have any after headache that  - during last two days getting sharp pain throughout the day at right temple, no vision changes   - generally feels like she can function and would like to wait until we can gt emgality before adding anything else. relpax is helping and refilled. For cluster emgality 300 mg SQ monthly until cycle is broken. We discussed we have other options if needed as well.

## 2022-07-26 NOTE — TELEPHONE ENCOUNTER
Urgent Emgality PA initiated on CMM  Key: [id number]  Received immediate approval-  INHNUW:[id number]:QDXQUARD; Review Type:Prior Auth; Coverage Start Date:06/26/2022; Coverage End Date:07/26/2023

## 2022-07-28 DIAGNOSIS — G44.011 INTRACTABLE EPISODIC CLUSTER HEADACHE: ICD-10-CM

## 2022-07-28 RX ORDER — ELETRIPTAN HYDROBROMIDE 40 MG/1
40 TABLET, FILM COATED ORAL ONCE AS NEEDED
Qty: 18 TABLET | Refills: 1 | Status: SHIPPED | OUTPATIENT
Start: 2022-07-28 | End: 2022-09-09

## 2022-07-28 NOTE — TELEPHONE ENCOUNTER
pt called and states that [facility] needed to speak to us regarding the relpax  i was able to locate a fax from [facility] asking if we authorized eletriptan which is a covered alternative  she also states that [facility] usually does a 90 day supply and script was only sent for 6 tabs and was sent as [facility]  she states that she has been doing generic as brand is too expensive  she is asking for generic be sent for a 90 day supply    States that she did inject emgality last night    I entered a new script for generic and 90 day supply  Please sign off if agreeable      [phone] to leave detailed message or ok to send [employer] message

## 2022-08-02 ENCOUNTER — TELEPHONE (OUTPATIENT)
Dept: NEUROLOGY | Facility: CLINIC | Age: 57
End: 2022-08-02

## 2022-08-02 DIAGNOSIS — G44.021 INTRACTABLE CHRONIC CLUSTER HEADACHE: Primary | ICD-10-CM

## 2022-08-02 DIAGNOSIS — G44.011 INTRACTABLE EPISODIC CLUSTER HEADACHE: ICD-10-CM

## 2022-08-02 RX ORDER — DEXAMETHASONE 2 MG/1
TABLET ORAL
Qty: 5 TABLET | Refills: 0 | Status: SHIPPED | OUTPATIENT
Start: 2022-08-02 | End: 2022-08-18

## 2022-08-02 NOTE — TELEPHONE ENCOUNTER
Pt left  stating that her cluster headache hasn't gone away  She took the injections, she doesn't remember the name and doesn't seem to be helping and asking for something else  Per chart, she was prescribed emgality 300mg monthly      [phone]    Please advise

## 2022-08-02 NOTE — TELEPHONE ENCOUNTER
called pt, she injected emgality last wednesday or thursday  states that friday and saturday she was able to sleep and didn't have any headaches during those 2 days  sunday she had a few headaches and took her relpax and that took it away completely  Then headache started again over night, had one every 1 5 hours, lasting 30min  she did not take a relpax last night as she was hoping it would go away but it didn't and then got worse  currently does not have a headache  She is agreeable to decadron  I spoke to dr Jayda Duarte and she was agreeable to vitrtual appt and made her aware of above  She ordered decadron for pt     I scheduled pt for virtual appt on 8/4

## 2022-08-02 NOTE — TELEPHONE ENCOUNTER
When did she do it? No improvement whatsoever? Is the relpax helping? We can try decadron as well now if she would like and/or toradol IM and I could get her in at 1230 on Thursday if she would like so I can make sure nothing else going on as well as make further recs such as other pills we could try

## 2022-08-04 ENCOUNTER — TELEMEDICINE (OUTPATIENT)
Dept: NEUROLOGY | Facility: CLINIC | Age: 57
End: 2022-08-04
Payer: COMMERCIAL

## 2022-08-04 ENCOUNTER — TELEPHONE (OUTPATIENT)
Dept: NEUROLOGY | Facility: CLINIC | Age: 57
End: 2022-08-04

## 2022-08-04 DIAGNOSIS — G44.029 CHRONIC CLUSTER HEADACHE, NOT INTRACTABLE: ICD-10-CM

## 2022-08-04 PROCEDURE — 99215 OFFICE O/P EST HI 40 MIN: CPT | Performed by: PSYCHIATRY & NEUROLOGY

## 2022-08-04 RX ORDER — AMITRIPTYLINE HYDROCHLORIDE 25 MG/1
50 TABLET, FILM COATED ORAL
Qty: 90 TABLET | Refills: 4 | Status: SHIPPED | OUTPATIENT
Start: 2022-08-04

## 2022-08-04 RX ORDER — VERAPAMIL HYDROCHLORIDE 40 MG/1
TABLET ORAL
Qty: 270 TABLET | Refills: 0 | Status: SHIPPED | OUTPATIENT
Start: 2022-08-04 | End: 2022-08-18 | Stop reason: SDUPTHER

## 2022-08-04 RX ORDER — DIVALPROEX SODIUM 500 MG/1
TABLET, DELAYED RELEASE ORAL
Qty: 10 TABLET | Refills: 0 | Status: SHIPPED | OUTPATIENT
Start: 2022-08-04 | End: 2022-08-18

## 2022-08-04 NOTE — PROGRESS NOTES
Virtual Regular Visit    Verification of patient location:    Patient is located in the following state in which I hold an active license [place]      Assessment/Plan:    Problem List Items Addressed This Visit        Nervous and Auditory    Cluster headaches    Relevant Medications    amitriptyline (ELAVIL) 25 mg tablet    divalproex sodium (Depakote) 500 mg EC tablet    verapamil (CALAN) 40 mg tablet               Reason for visit is   Chief Complaint   Patient presents with    Virtual Regular Visit        Encounter provider Omar Moreau MD    Provider located at [address]  [facility]  [address] [address]  [phone]      Recent Visits  Date Type Provider Dept   08/02/22 Telephone Yony Schulte RN [facility] [address] recent visits within past 7 days and meeting all other requirements  Today's Visits  Date Type Provider Dept   08/04/22 Telephone Shawn Garcia, [address]   08/04/22 1110 Patrick Dudley MD [facility] [address] today's visits and meeting all other requirements  Future Appointments  No visits were found meeting these conditions  Showing future appointments within next 150 days and meeting all other requirements       The patient was identified by name and date of birth  Verlee Loges was informed that this is a telemedicine visit and that the visit is being conducted through [facility] and patient was informed this is a secure, HIPAA-complaint platform  She agrees to proceed  My office door was closed  No one else was in the room  She acknowledged consent and understanding of privacy and security of the video platform  The patient has agreed to participate and understands they can discontinue the visit at any time  Patient is aware this is a billable service  Subjective    Assessment/Plan:   Cintia Walker is a delightful 56 y  o  female with a past medical history that includes cluster headaches, chronic venous insufficiency, lymphedema, chronic back pain on and off, hip arthritis, obesity referred here for evaluation of headache  She has followed with my neurology colleagues in the past  My initial visit 01/24/2020     Chronic cluster headache, not intractable  Anisocoria  Patient reports a long history of chronic headaches since her 35s    She reports her headaches, in clusters typically daily for 3 months at a time and occur in the evenings  Richarda Lucas can last anywhere from 15 minutes up to an hour and may happen 3 times at night in 2 times before bed  Richarda Lucas tend to build until they reach maximal intensity and then resolve   She reports may start with right teeth pain and then become right frontal and right retro-orbital stabbing pain although the most recent episode was mid frontal sharp electrical   Pain is severe   She reports associated features include photophobia, photophobia, right eye lacrimation, nasal congestion    - as of 1/24/2020: 3 months at a time will have daily headaches daily  - As of 05/29/2020: no headaches since last time, weaned herself off of lyrica and verapamil and just on amitriptyline 50 mg nightly and doing well  Computer read of recent EKG showed likely incorrect abnormalities since she was told it was normal, just want her PCP to look over it to make sure this is the case while on amitriptyline    - As of 9/11/2020:  No headaches since last visit, accidentally stop taking amitriptyline 50 mg nightly for 1 week and will restart, refilled today  We discussed plan for next time she has a cluster headache cycle - plan to start emgality - she will call in right went cycle starts  - as of 2/26/2021:  She has not had significant headache in the past year on amitriptyline 50 mg nightly  - as of 8/4/2022: after years of doing very well, cluster returned mid July   Started emgality 300 mg monthly for cluster headache 7/27/22 which helped a few days and may help more so continue  Decadron has not helped  Will add depakote and if needed also verapamil, both of which she has tolerated in the past for cluster  Now just weighing rapidly increasing meds vs possible side effects of BP dropping, she has cuff at home  Also recommended sleep study as TOBIAS may be contributing       Workup:  - Neurologic assessment reveals normal neurological exam, except for anisocoria with right pupil larger than left  -  MRI brain with without contrast 02/07/2020: No acute disease   Punctate pontine capillary telangiectasia    No orbital pathology      Preventative:  - we discussed headache hygiene and lifestyle factors that may improve headaches  -  amitriptyline 50 mg nightly  Discussed proper use, possible side effects and risks  - continue  emgality - SubQ: 300 mg at the onset of the cluster period and then once monthly until the end of the cluster period  -     divalproex sodium (Depakote) 500 mg EC tablet; 1-2 tabs nightly for 5 night to see if we can break cycle  Discussed proper use, possible side effects and risks  -     verapamil (CALAN) 40 mg tablet; 40 mg BID for 3 days, then increase to 80 mg BID for 3 days, then 120 mg BID for 3 days, then if needed 120 mg TID, stay on lowest effective and tolerated dose  Discussed proper use, possible side effects and risks  - of note through other providers she is on daily meloxicam which is helped with her head chronic hip pain  - past/failed:  Carbamazepine, Depakote 500 mg, topiramate, gabapentin, metoprolol, lyrica, verapamil, oxcarbazepine        Abortive:  - discussed not taking over-the-counter or prescription pain medications more than 3 days per week to prevent medication overuse/rebound headache  - she reports Relpax previously prescribed by my neurology colleague works well  Discussed proper use, possible side effects and risks   -past:  Steroids helped in the past but lately has not helped, sumatriptan p o  Nasal helped for a little while and was too expensive, rizatriptan she believes was helpful but too expensive - typically the pain starts too fast and ends before she is able to take something            Patient instructions       I recommend sleep study to rule out sleep study contributing to this  I will order if pulmonary doesn't order tomorrow       Headache/migraine treatment:   Abortive medications (for immediate treatment of a headache): It is ok to take ibuprofen, acetaminophen or naproxen (Advil, Tylenol,  Aleve, Excedrin) if they help your headaches you should limit these to No more than 3 times a week to avoid medication overuse/rebound headaches       For your more moderate to severe migraines take this medication early   Eletriptan 40mg tabs - take one at the onset of headache  May repeat one time after 1-2 hours if pain has not resolved  Max 2 a day     depakote 500-1000 mg nightly for 5 nights to try and break cycle      Prescription preventive medications for headaches/migraines   (to take every day to help prevent headaches - not to take at the time of headache):  [x]? ? amitriptyline continue 50 mg nightly  emgality 300 mg monthly     next would add verapamil - 40 mg BID for 3 days, then increase to 80 mg BID for 3 days, then 120 mg BID for 3 days, then if needed 120 mg TID, stay on lowest effective and tolerated dose       *Typically these types of medications take time untill you see the benefit, although some may see improvement in days, often it may take weeks, especially if the medication is being titrated up to a beneficial level  Please contact us if there are any concerns or questions regarding the medication          Lifestyle Recommendations:  [x]? ? SLEEP - Maintain a regular sleep schedule: Adults need at least 7-8 hours of uninterrupted a night   Maintain good sleep hygiene:  Going to bed and waking up at consistent times, avoiding excessive daytime naps, avoiding caffeinated beverages in the evening, avoid excessive stimulation in the evening and generally using bed primarily for sleeping   One hour before bedtime would recommend turning lights down lower, decreasing your activity (may read quietly, listen to music at a low volume)  When you get into bed, should eliminate all technology (no texting, emailing, playing with your phone, iPad or tablet in bed)  [x]?? HYDRATION - Maintain good hydration   Drink  2L of fluid a day (4 typical small water bottles)  [x]? ? DIET - Maintain good nutrition  In particular don't skip meals and try and eat healthy balanced meals regularly  [x]? ? TRIGGERS - Look for other triggers and avoid them: Limit caffeine to 1-2 cups a day or less  Avoid dietary triggers that you have noticed bring on your headaches (this could include aged cheese, peanuts, MSG, aspartame and nitrates)  [x]? ? EXERCISE - physical exercise as we all know is good for you in many ways, and not only is good for your heart, but also is beneficial for your mental health, cognitive health and  chronic pain/headaches  I would encourage at the least 5 days of physical exercise weekly for at least 30 minutes       Education and Follow-up  [x]? ? Please call with any questions or concerns  Of course if any new concerning symptoms go to the emergency department  [x]? ? Follow up 2 weeks, sooner if needed        CC: We had the pleasure of evaluating Keesha Walker in neurological consultation today  Isabel Sarkar a   right handed female who presents today for evaluation of headaches       History obtained from patient as well as available medical record review    History of Present Illness:   Interval history as of 8/4/2022  - she was to follow up in a year and is now following up 1 5 years later due to return of headaches  - retiring at the end of the year and nervous about it     Headaches and migraines    - ran out of amitriptyline mid May due to pharmacy issue and missed one week and started to feel like she may have headaches again 5/18/22 but then didn't get them  - 7/24/22 msg reporting headaches for the past week, I received the message from staff 2 days later and called her and we added emgality 300 mg monthly for cluster  - 8/2/22 called reporting headaches continue after improving for 2-3 days, decadron added and on 3rd day   - still having headaches daily now, about 2 times a day, lasting about 20 minutes, not as bad as previous years, still crying at the end and trying not to since it makes it feel worse, pressure helps on the region  - right temporal and jaw and behind the eye and less so in the teeth, no vision changes    Preventative:   - emgality 300 mg monthly 7/27/22 - belly no issues   - amitriptyline 50 mg nightly - ran out mid May due to pharmacy issue and missed one week and started to have headaches again 5/18/22    Abortive:   - eletriptan/relpax  - hydrocodone for back sometimes - not taking   Denies bothersome side effects     -----------------   - seeing pulmonary tomorrow, up every 2 hours, snores   How likely a you to doze off or fall sleep during the following situations?   0 - would never doze  1 - slight chance of dozing  2 - moderate chance of dosing  3 - high chance of dozing    [place] sleepiness scale - 11  Sitting and reading - 2  Watching TV - 3  Sitting, inactive in a public place such as a theater 0  As a passenger in a car for an hour without a break -0   Lying down to rest in afternoon when circumstances permit - 3  Sitting and talking to someone - 0  Sitting quietly after lunch without alcohol 3  In a car while stopped for few minutes in traffic - 0      Interval history as of 02/26/2021   - dx COVID last weds   - has not smoked in a year     She reports she has not had a significant headache or migraine in over a year   (she had these headaches about 10 years and thinks menopaused ended a few years ago, but we discussed they may have been hormonally related and maybe she is done with them now)  Preventative: Amitriptyline 50 mg nightly  Abortive:  Eletriptan     Interval history as of 9/11/2020:  - just had hip surgery in 8/2020  - forgot to take amitriptyline 50 mg for past week - will restart  - no headaches since we last talked         Interval history as of 5/29/2020:   -  MRI brain with without contrast 02/07/2020: No acute disease   Punctate pontine capillary telangiectasia    No orbital pathology      Headaches -none      She called in 03/10/2020 wanted to decrease Lyrica on verapamil, Amitriptyline-attempted to wean off but patient feel better on 25 mg nightly      Lyrica - weaned herself off  Verapamil - weaned herself off  Amitriptyline - 50 mg and doing great, sleeping wonderful 10-8      relpax not needed     Headaches started at what age? Late 24s years old  How often do the headaches occur?   - as of 1/24/2020: 3 months at a time headaches daily, none in 6 weeks   - As of 05/29/2020: no headaches since last time, weaned herself off of lyrica and verapamil and just on amitriptyline 50 mg nightly and doing well   What time of the day do the headaches start? only at night,  most of the time during sleep or before she sleeps at night  How long do the headaches last? 15 mins to an hour - 3 times at night and 2 times before bed - worsens until it gets to a point where it blows up and then goes away   Are you ever headache free? Yes     Aura? without aura     Where is your headache located and pain quality?   - starts with right teeth pain and feels it coming   - usually right frontal and right retroorbital - stabbing  - midfrontal lately - sharp, electtical  What is the intensity of pain? Average: 15/10  Associated symptoms:   []?? Nausea       []? ? Vomiting        []? ?Walt Query  [x]? ? Insomnia    []? ? Stiff or sore neck   [x]? ? Problems with concentration  [x]? ? Photophobia     [x]? ?Phonophobia      []? ? Osmophobia  []? ? Blurred vision   [x]? ? Prefer quiet, dark room  []? ? Light-headed or dizzy     []? ? Tinnitus    []? ? Hands or feet tingle or feel numb/paresthesias       []? ? Red ear      []? ? Ptosis      []? ? Facial droop  [x]? ? Lacrimation - right tearing  [x]? ? Nasal congestion  []? ? Flushing of face  []? ? Change in pupil size     Number of days missed per month because of headaches:  Work (or school) days: 0     Things that make the headache worse? No specific movements, any movement      Headache triggers:  Unknown   What time of the year do headaches occur more frequently?   are usually worse winter but has also had in July     Have you seen someone else for headaches or pain? Yes, Dr Geronimo Villalobos  Have you had trigger point injection performed and how often? No  Have you had Botox injection performed and how often? No   Have you had epidural injections or transforaminal injections performed? No  Are you current pregnant or planning on getting pregnant? No   Have you ever had any Brain imaging? Yes multiple normal      What medications do you take or have you taken for your headaches?    ABORTIVE:    OTC medications have been ineffective         Relpax - 12/month - if takes before bed able to sleep for 4 hours -      Has norco for back once a year when back goes out      Past:  Steroids helped in the past, but last couple bathes lasted a day only  Sumatriptan PO and nasal helped for a little   Rizatriptan - was helpful and too expensive      PREVENTIVE:      - amitriptyline 25 mg - been on for years - up to 50 in cycle   For hip pain: meloxicam      Past:  - Verapamil 240 mg daily (in past 240 +120)   - pregabalin 75 mg daily- started 1 week before being 6 weeks headache free        Past/failed:   carbamazepine - did not help  depakote did not help   topiramate 100 mg over 3 months   Gabapentin helped in the past and not recently  Metoprolol        LIFESTYLE  Sleep   - averages: normally sleeps well     Physical activity: none      Water: 0 per day  Caffeine: 8 cups per day  Diet:  overeating     Mood:   Denies history of anxiety or depression or other diagnosed mood disorder     The following portions of the patient's history were reviewed and updated as appropriate: allergies, current medications, past family history, past medical history, past social history, past surgical history and problem list      Pertinent family history:  Family history of headaches:  Migraines in daughter, sister and mother  Any family history of aneurysms - No     Pertinent social history:  [place]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  [address]  Lives with      Illicit Drugs: denies  Alcohol/tobacco: quiting tobacco - 5 cigs improvement, alcohol no       Past Medical History:   Diagnosis Date    Asymptomatic postmenopausal status     Back pain, lumbosacral     Cellulitis of left lower extremity     last assessed    3/30/16    resolved  Henery Jurist   9/8/16      Cluster headaches     Colon polyps     CTS (carpal tunnel syndrome) 3 years ago    Dense breasts     Foot pain, left     Head injury     Dec 2017 no LOC    Hip pain, right     Lumbosacral pain     Lymph edema     Nosebleed     Right hip pain     Spider veins     Tendinitis of right shoulder     Varicose veins of legs        Past Surgical History:   Procedure Laterality Date    BACK SURGERY      type unknown- occurred during vaginal delivery    BREAST BIOPSY Left     HAND TENDON SURGERY      DE REVISE MEDIAN N/CARPAL TUNNEL SURG Right 09/12/2016    Procedure: WRIST OPEN CARPAL TUNNEL RELEASE ;  Surgeon: Fredy Arce MD;  [facility];  Service: Orthopedics    SINUS SURGERY      TOTAL HIP ARTHROPLASTY Right 08/24/2020    TUBAL LIGATION      VASCULAR SURGERY  various dates    VEIN LIGATION AND STRIPPING Left        Current Outpatient Medications   Medication Sig Dispense Refill    amitriptyline (ELAVIL) 25 mg tablet Take 2 tablets (50 mg total) by mouth daily at bedtime 90 tablet 4    dexamethasone (DECADRON) 2 mg tablet One tab with breakfast for 1-5 days for unrelenting migraine 5 tablet 0    divalproex sodium (Depakote) 500 mg EC tablet 1-2 tabs nightly for 5 night to see if we can break cycle 10 tablet 0    eletriptan (Relpax) 40 MG tablet Take 1 tablet (40 mg total) by mouth once as needed for migraine may repeat in 2 hours if necessary  Max 2 doses per 24 hours  18 tablet 1    Galcanezumab-gnlm (Emgality, 300 MG Dose,) 100 MG/ML SOSY Inject 3 mL (300 mg total) under the skin every 30 (thirty) days Until cluster cycle stops 3 mL 3    Naproxen Sodium 220 MG CAPS Take 2 capsules by mouth if needed      verapamil (CALAN) 40 mg tablet 40 mg BID for 3 days, then increase to 80 mg BID for 3 days, then 120 mg BID for 3 days, then if needed 120 mg TID, stay on lowest effective and tolerated dose 270 tablet 0     No current facility-administered medications for this visit  No Known Allergies      Objective:     Exam limited by Video  Physical Exam:                                                                 Vitals:            Constitutional:    LMP 01/01/2015   BP Readings from Last 3 Encounters:   04/27/22 128/68   03/23/22 130/80   09/27/21 138/74     Pulse Readings from Last 3 Encounters:   04/27/22 96   09/27/21 92   04/27/21 97         Well developed, well nourished, No dysmorphic features  HEENT:  Normocephalic atraumatic  See neuro exam   Psychiatric:  Normal behavior and appropriate affect        Neurological Examination:     Mental status/cognitive function:   Recent and remote memory appear intact  Attention span and concentration as well as fund of knowledge appear appropriate for age  Normal language and spontaneous speech  Cranial Nerves:  III, IV, VI-Pupils were equal, round  Extraocular movements appear full and conjugate   VII-facial expression symmetric  Motor Exam: symmetric bulk throughout  no atrophy, fasciculations or abnormal movements noted     Coordination:  no apparent dysmetria, ataxia or tremor noted       Pertinent lab results:   See EMR for recent labs  06/21/2022 TSH and free T4 normal    CMP and CBC unremarkable except for slightly elevated alkaline phosphatase  01/28/2020 CMP significant for sodium 147, B12 260    10/29/2019 CMP and CBC unremarkable, TSH normal     06/08/2016 B12 283     Imaging:   MRI of the brain around 4 years ago normal per patient      MRA head 01/21/2015:  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   No evidence of intracranial aneurysm, AVM, cortical cerebral arterial stenosis or occlusion  - follow up CT scan was normal, no cyst      Review of Systems:   ROS obtained by medical assistant Personally reviewed and updated if indicated  I recommended PCP follow up for non neurologic problems  Review of Systems   Constitutional: Negative  HENT: Negative  Eyes: Negative  Respiratory: Negative  Cardiovascular: Negative  Gastrointestinal: Negative  Endocrine: Negative  Genitourinary: Negative  Musculoskeletal: Negative  Skin: Negative  Allergic/Immunologic: Negative  Neurological: Positive for headaches  Hematological: Negative  Psychiatric/Behavioral: Negative  I have spent 35 minutes with Patient today in which greater than 50% of this time was spent in counseling/coordination of care regarding Risks and benefits of tx options, Intructions for management, Patient education, Importance of tx compliance, Risk factor reductions and Impressions  I also spent 8 minutes non face to face for this patient the same day  VIRTUAL VISIT DISCLAIMER      Anne Landon verbally agrees to participate in [employer]   Pt is aware that [employer] could be limited without vital signs or the ability to perform a full hands-on physical exam  Keesha Dominick Lama understands she or the provider may request at any time to terminate the video visit and request the patient to seek care or treatment in person

## 2022-08-04 NOTE — PATIENT INSTRUCTIONS
I recommend sleep study to rule out sleep study contributing to this  I will order if pulmonary doesn't order tomorrow  Headache/migraine treatment:   Abortive medications (for immediate treatment of a headache): It is ok to take ibuprofen, acetaminophen or naproxen (Advil, Tylenol,  Aleve, Excedrin) if they help your headaches you should limit these to No more than 3 times a week to avoid medication overuse/rebound headaches  For your more moderate to severe migraines take this medication early   Eletriptan 40mg tabs - take one at the onset of headache  May repeat one time after 1-2 hours if pain has not resolved  Max 2 a day     depakote 500-1000 mg nightly for 5 nights to try and break cycle      Prescription preventive medications for headaches/migraines   (to take every day to help prevent headaches - not to take at the time of headache):  [x] amitriptyline continue 50 mg nightly  emgality 300 mg monthly     next would add verapamil - 40 mg BID for 3 days, then increase to 80 mg BID for 3 days, then 120 mg BID for 3 days, then if needed 120 mg TID, stay on lowest effective and tolerated dose       *Typically these types of medications take time untill you see the benefit, although some may see improvement in days, often it may take weeks, especially if the medication is being titrated up to a beneficial level  Please contact us if there are any concerns or questions regarding the medication  Lifestyle Recommendations:  [x] SLEEP - Maintain a regular sleep schedule: Adults need at least 7-8 hours of uninterrupted a night  Maintain good sleep hygiene:  Going to bed and waking up at consistent times, avoiding excessive daytime naps, avoiding caffeinated beverages in the evening, avoid excessive stimulation in the evening and generally using bed primarily for sleeping    One hour before bedtime would recommend turning lights down lower, decreasing your activity (may read quietly, listen to music at a low volume)  When you get into bed, should eliminate all technology (no texting, emailing, playing with your phone, iPad or tablet in bed)  [x] HYDRATION - Maintain good hydration  Drink  2L of fluid a day (4 typical small water bottles)  [x] DIET - Maintain good nutrition  In particular don't skip meals and try and eat healthy balanced meals regularly  [x] TRIGGERS - Look for other triggers and avoid them: Limit caffeine to 1-2 cups a day or less  Avoid dietary triggers that you have noticed bring on your headaches (this could include aged cheese, peanuts, MSG, aspartame and nitrates)  [x] EXERCISE - physical exercise as we all know is good for you in many ways, and not only is good for your heart, but also is beneficial for your mental health, cognitive health and  chronic pain/headaches  I would encourage at the least 5 days of physical exercise weekly for at least 30 minutes  Education and Follow-up  [x] Please call with any questions or concerns  Of course if any new concerning symptoms go to the emergency department    [x] Follow up 2-3 weeks, sooner if needed

## 2022-08-05 ENCOUNTER — CONSULT (OUTPATIENT)
Dept: PULMONOLOGY | Facility: CLINIC | Age: 57
End: 2022-08-05
Payer: COMMERCIAL

## 2022-08-05 VITALS
HEIGHT: 71 IN | RESPIRATION RATE: 18 BRPM | SYSTOLIC BLOOD PRESSURE: 138 MMHG | BODY MASS INDEX: 39.48 KG/M2 | WEIGHT: 282 LBS | OXYGEN SATURATION: 97 % | TEMPERATURE: 98.2 F | DIASTOLIC BLOOD PRESSURE: 80 MMHG | HEART RATE: 102 BPM

## 2022-08-05 DIAGNOSIS — J98.4 RESTRICTIVE LUNG DISEASE: ICD-10-CM

## 2022-08-05 DIAGNOSIS — R06.09 DYSPNEA ON EXERTION: Primary | ICD-10-CM

## 2022-08-05 DIAGNOSIS — R94.2 ABNORMAL PFTS: ICD-10-CM

## 2022-08-05 DIAGNOSIS — E66.9 OBESITY (BMI 30-39.9): ICD-10-CM

## 2022-08-05 DIAGNOSIS — G47.33 OSA (OBSTRUCTIVE SLEEP APNEA): ICD-10-CM

## 2022-08-05 PROCEDURE — 99204 OFFICE O/P NEW MOD 45 MIN: CPT | Performed by: INTERNAL MEDICINE

## 2022-08-05 NOTE — PROGRESS NOTES
Assessment/Plan:     Diagnoses and all orders for this visit:    Dyspnea on exertion  -     Ambulatory Referral to Pulmonology    Abnormal PFTs  -     Ambulatory Referral to Pulmonology    Restrictive lung disease  -     CT chest high resolution; Future    TOBIAS (obstructive sleep apnea)  -     Extended Diagnostic Sleep Study; Future    Obesity (BMI 30-39  9)          Plan for follow up:  PFTs mainly restrictive limitation also her FEV1 was mildly decreased with small airway obstructive limitation as well and mildly decreased DLCO   She is having the shortness of breath and dyspnea on exertion increased since the COVID-19 infection and given her restrictive lung disease with decreased DLCO she would benefit from a high-resolution CT of the chest to rule out any interstitial changes  Also high clinical suspicion for obstructive sleep apnea etiology pathogenesis discussed   Testing procedure was discussed Will get an extended diagnostic lead   Currently not on any inhalers   Will follow-up after the high-resolution CT of the chest  She has a candidate for annual lung cancer screening  Recommend weight loss  Return in about 6 weeks (around 9/16/2022)  All questions are answered to the patient's satisfaction and understanding  She verbalizes understanding  She is encouraged to call with any further questions or concerns  Portions of the record may have been created with voice recognition software  Occasional wrong word or "sound a like" substitutions may have occurred due to the inherent limitations of voice recognition software  Read the chart carefully and recognize, using context, where substitutions have occurred  a    Electronically Signed by Darryle Breaker, MD    ______________________________________________________________________    Chief Complaint:   Chief Complaint   Patient presents with    Breathing Problem        Patient ID: Rodrick Chen is a 60 y o  y o  female has a past medical history of Asymptomatic postmenopausal status, Back pain, lumbosacral, Cellulitis of left lower extremity, Cluster headaches, Colon polyps, CTS (carpal tunnel syndrome) (3 years ago), Dense breasts, Foot pain, left, Head injury, Hip pain, right, Lumbosacral pain, Lymph edema, Nosebleed, Right hip pain, Spider veins, Tendinitis of right shoulder, and Varicose veins of legs  8/5/2022  Patient presents today for initial visit  Patient is a very pleasant 68-year-old lady, with 44 pack-year smoking history who quit in 2020 she states she also contracted COVID-19 infection in February of 2021 since then her breathing has been better with increasing shortness of breath she states  She was not admitted in the hospital for COVID-19 infection she does not currently have any cough has shortness of breath and dyspnea on exertion especially when she climbs a flight of stairs   Also she has states has been following up with Neurology for cluster headaches and she also complains of loud snoring witnessed apneic spells occasional choking and gasping for air her daily sleep schedule is she goes to bed at around 10:00 p m  falls asleep right away and is out of bed at 6:00 a m  has 2-3 nocturnal awakenings for nocturia and when she is out of the bed she still tired and fatigued she states currently does not take a nap during the daytime  No symptoms related to restless legs      Occupational/Exposure history:  Works in a postal office  Pets/Enviroment:  Four cats and a dog  Travel history:  None  Review of Systems   Constitutional: Positive for unexpected weight change  HENT: Negative  Eyes: Negative  Respiratory: Positive for shortness of breath  Cardiovascular: Negative  Gastrointestinal: Negative  Endocrine: Negative  Genitourinary: Negative  Neurological: Positive for headaches  Social history: She reports that she quit smoking about 2 years ago  She started smoking about 46 years ago   She has a 44 00 pack-year smoking history  She has never used smokeless tobacco  She reports current alcohol use  She reports that she does not use drugs      Past surgical history:   Past Surgical History:   Procedure Laterality Date    BACK SURGERY      type unknown- occurred during vaginal delivery    BREAST BIOPSY Left     HAND TENDON SURGERY      RI REVISE MEDIAN N/CARPAL TUNNEL SURG Right 09/12/2016    Procedure: WRIST OPEN CARPAL TUNNEL RELEASE ;  Surgeon: Medina Blount MD;  [facility];  Service: Orthopedics    SINUS SURGERY      TOTAL HIP ARTHROPLASTY Right 08/24/2020    TUBAL LIGATION      VASCULAR SURGERY  various dates    VEIN LIGATION AND STRIPPING Left      Family history:   Family History   Problem Relation Age of Onset   Mario Polio Sudden death Mother         MVA hit by a drunk     Varicose Veins Father     Heart disease Father         cardiac disorder    Breast cancer Sister         neoplasm    Other Maternal Grandmother         gyn problem    Cervical cancer Maternal Grandmother     Breast cancer Maternal Aunt         neoplasm    Migraines Daughter     Cancer Sister         Appendix    Colon cancer Neg Hx     Ovarian cancer Neg Hx     Uterine cancer Neg Hx        Immunization History   Administered Date(s) Administered    COVID-19 PFIZER VACCINE 0 3 ML IM 05/23/2021, 06/13/2021    Influenza, recombinant, quadrivalent,injectable, preservative free 10/23/2019, 11/06/2020    Tdap 03/30/2016     Current Outpatient Medications   Medication Sig Dispense Refill    amitriptyline (ELAVIL) 25 mg tablet Take 2 tablets (50 mg total) by mouth daily at bedtime 90 tablet 4    dexamethasone (DECADRON) 2 mg tablet One tab with breakfast for 1-5 days for unrelenting migraine 5 tablet 0    divalproex sodium (Depakote) 500 mg EC tablet 1-2 tabs nightly for 5 night to see if we can break cycle 10 tablet 0    eletriptan (Relpax) 40 MG tablet Take 1 tablet (40 mg total) by mouth once as needed for migraine may repeat in 2 hours if necessary  Max 2 doses per 24 hours  18 tablet 1    Galcanezumab-gnlm (Emgality, 300 MG Dose,) 100 MG/ML SOSY Inject 3 mL (300 mg total) under the skin every 30 (thirty) days Until cluster cycle stops 3 mL 3    Naproxen Sodium 220 MG CAPS Take 2 capsules by mouth if needed      verapamil (CALAN) 40 mg tablet 40 mg BID for 3 days, then increase to 80 mg BID for 3 days, then 120 mg BID for 3 days, then if needed 120 mg TID, stay on lowest effective and tolerated dose 270 tablet 0     No current facility-administered medications for this visit  Allergies: Patient has no known allergies  Objective:  Vitals:    08/05/22 1349 08/05/22 1353   BP: 138/80    BP Location: Right arm    Patient Position: Sitting    Cuff Size: Large    Pulse: 102    Resp: 18    Temp: 98 2 °F (36 8 °C)    TempSrc: Tympanic    SpO2: 97% 97%   Weight: 128 kg (282 lb)    Height: 5' 11" (1 803 m)    Oxygen Therapy  SpO2: 97 %  Oxygen Therapy: None (Room air)    Wt Readings from Last 3 Encounters:   08/05/22 128 kg (282 lb)   05/10/22 128 kg (282 lb)   04/27/22 128 kg (282 lb 12 8 oz)     Body mass index is 39 33 kg/m²  Physical Exam  Constitutional:       Appearance: She is well-developed  HENT:      Head: Normocephalic and atraumatic  Eyes:      Pupils: Pupils are equal, round, and reactive to light  Neck:      Comments: Short and wide neck  Cardiovascular:      Rate and Rhythm: Normal rate and regular rhythm  Heart sounds: Normal heart sounds  Pulmonary:      Effort: Pulmonary effort is normal       Breath sounds: Normal breath sounds  Musculoskeletal:         General: Normal range of motion  Cervical back: Normal range of motion and neck supple  Skin:     General: Skin is warm and dry  Neurological:      Mental Status: She is alert and oriented to person, place, and time  Psychiatric:         Behavior: Behavior normal              Diagnostics:  I have personally reviewed pertinent reports  ESS: Total score: 8

## 2022-08-11 ENCOUNTER — TELEPHONE (OUTPATIENT)
Dept: NEUROLOGY | Facility: CLINIC | Age: 57
End: 2022-08-11

## 2022-08-11 NOTE — TELEPHONE ENCOUNTER
Called patient and left voicemail to confirm their upcoming appointment with Dr Mor Henriquez  Informed patient about arriving in the [place] location 15 minutes prior to appointment to get checked in and go over chart

## 2022-08-18 ENCOUNTER — TELEMEDICINE (OUTPATIENT)
Dept: NEUROLOGY | Facility: CLINIC | Age: 57
End: 2022-08-18
Payer: COMMERCIAL

## 2022-08-18 DIAGNOSIS — G44.029 CHRONIC CLUSTER HEADACHE, NOT INTRACTABLE: ICD-10-CM

## 2022-08-18 PROCEDURE — 99214 OFFICE O/P EST MOD 30 MIN: CPT | Performed by: PSYCHIATRY & NEUROLOGY

## 2022-08-18 RX ORDER — VERAPAMIL HYDROCHLORIDE 120 MG/1
TABLET, FILM COATED ORAL
Qty: 60 TABLET | Refills: 5 | Status: SHIPPED | OUTPATIENT
Start: 2022-08-18

## 2022-08-18 NOTE — PATIENT INSTRUCTIONS
Headache/migraine treatment:   Abortive medications (for immediate treatment of a headache): It is ok to take ibuprofen, acetaminophen or naproxen (Advil, Tylenol,  Aleve, Excedrin) if they help your headaches you should limit these to No more than 3 times a week to avoid medication overuse/rebound headaches  For your more moderate to severe migraines take this medication early   Eletriptan 40mg tabs - take one at the onset of headache  May repeat one time after 1-2 hours if pain has not resolved  Max 2 a day        Prescription preventive medications for headaches/migraines   (to take every day to help prevent headaches - not to take at the time of headache):  [x] amitriptyline continue 50 mg nightly  emgality 300 mg monthly      verapamil -120 mg in am and in pm and add 40 mg mid day for 3 days, then if needed increase to 120/80/120 then if needed 120 three times a day   stay on lowest effective and tolerated dose       *Typically these types of medications take time untill you see the benefit, although some may see improvement in days, often it may take weeks, especially if the medication is being titrated up to a beneficial level  Please contact us if there are any concerns or questions regarding the medication  Lifestyle Recommendations:  [x] SLEEP - Maintain a regular sleep schedule: Adults need at least 7-8 hours of uninterrupted a night  Maintain good sleep hygiene:  Going to bed and waking up at consistent times, avoiding excessive daytime naps, avoiding caffeinated beverages in the evening, avoid excessive stimulation in the evening and generally using bed primarily for sleeping  One hour before bedtime would recommend turning lights down lower, decreasing your activity (may read quietly, listen to music at a low volume)  When you get into bed, should eliminate all technology (no texting, emailing, playing with your phone, iPad or tablet in bed)  [x] HYDRATION - Maintain good hydration  Drink  2L of fluid a day (4 typical small water bottles)  [x] DIET - Maintain good nutrition  In particular don't skip meals and try and eat healthy balanced meals regularly  [x] TRIGGERS - Look for other triggers and avoid them: Limit caffeine to 1-2 cups a day or less  Avoid dietary triggers that you have noticed bring on your headaches (this could include aged cheese, peanuts, MSG, aspartame and nitrates)  [x] EXERCISE - physical exercise as we all know is good for you in many ways, and not only is good for your heart, but also is beneficial for your mental health, cognitive health and  chronic pain/headaches  I would encourage at the least 5 days of physical exercise weekly for at least 30 minutes  Education and Follow-up  [x] Please call with any questions or concerns  Of course if any new concerning symptoms go to the emergency department    [x] Follow up 2 weeks, sooner if needed

## 2022-08-18 NOTE — PROGRESS NOTES
Review of Systems   Constitutional: Negative for appetite change and fever  HENT: Negative  Negative for hearing loss, tinnitus, trouble swallowing and voice change  Eyes: Negative  Negative for photophobia and pain  Respiratory: Negative  Negative for shortness of breath  Cardiovascular: Negative  Negative for palpitations  Gastrointestinal: Negative  Negative for nausea and vomiting  Endocrine: Negative  Negative for cold intolerance  Genitourinary: Negative  Negative for dysuria, frequency and urgency  Musculoskeletal: Negative  Negative for myalgias and neck pain  Skin: Negative  Negative for rash  Neurological: Positive for headaches  Negative for dizziness, tremors, seizures, syncope, facial asymmetry, speech difficulty, weakness, light-headedness and numbness  Hematological: Negative  Does not bruise/bleed easily  Psychiatric/Behavioral: Positive for sleep disturbance  Negative for confusion and hallucinations  All other systems reviewed and are negative

## 2022-08-18 NOTE — PROGRESS NOTES
Virtual Regular Visit    Verification of patient location:    Patient is located in the following state in which I hold an active license [place]      Assessment/Plan:    Problem List Items Addressed This Visit        Nervous and Auditory    Cluster headaches    Relevant Medications    verapamil (CALAN) 120 mg tablet               Reason for visit is   Chief Complaint   Patient presents with    Virtual Regular Visit        Encounter provider Kaitlin Skinner MD    Provider located at [address] [address] [address] [place] [address]  [phone]      Recent Visits  Date Type Provider Dept   08/11/22 Telephone Frandy Jefferson [address] [place] [address] recent visits within past 7 days and meeting all other requirements  Today's Visits  Date Type Provider Dept   08/18/22 Telemedicine Kaitlin Skinner MD [facility] [address] [facility] today's visits and meeting all other requirements  Future Appointments  No visits were found meeting these conditions  Showing future appointments within next 150 days and meeting all other requirements       The patient was identified by name and date of birth  Barbara Bravo was informed that this is a telemedicine visit and that the visit is being conducted through [address] and patient was informed this is a secure, HIPAA-complaint platform  She agrees to proceed  My office door was closed  No one else was in the room  She acknowledged consent and understanding of privacy and security of the video platform  The patient has agreed to participate and understands they can discontinue the visit at any time  Patient is aware this is a billable service  Subjective    Assessment/Plan:   Deric Walker is a delightful 56 y  o  female with a past medical history that includes cluster headaches, chronic venous insufficiency, lymphedema, chronic back pain on and off, hip arthritis, obesity referred here for evaluation of headache  She has followed with my neurology colleagues in the past  My initial visit 01/24/2020     Chronic cluster headache, not intractable  Anisocoria  Patient reports a long history of chronic headaches since her 35s    She reports her headaches, in clusters typically daily for 3 months at a time and occur in the evenings  Lavaughn Molt can last anywhere from 15 minutes up to an hour and may happen 3 times at night and 2 times before bed  Lavaughn Molt tend to build until they reach maximal intensity and then resolve   She reports may start with right teeth pain and then become right frontal and right retro-orbital stabbing pain although the most recent episode was mid frontal sharp electrical   Pain is severe   She reports associated features include photophobia, photophobia, right eye lacrimation, nasal congestion    - as of 1/24/2020: 3 months at a time will have daily headaches  - As of 05/29/2020: no headaches since last time, weaned herself off of lyrica and verapamil and just on amitriptyline 50 mg nightly and doing well  Computer read of recent EKG showed likely incorrect abnormalities since she was told it was normal, just want her PCP to look over it to make sure this is the case while on amitriptyline    - As of 9/11/2020:  No headaches since last visit, accidentally stop taking amitriptyline 50 mg nightly for 1 week and will restart, refilled today  We discussed plan for next time she has a cluster headache cycle - plan to start emgality - she will call in right went cycle starts  - as of 2/26/2021:  She has not had significant headache in the past year on amitriptyline 50 mg nightly  - as of 8/4/2022: after years of doing very well, cluster returned mid July  Started emgality 300 mg monthly for cluster headache 7/27/22 which helped a few days and may help more so continue  Decadron has not helped   Will add depakote and if needed also verapamil, both of which she has tolerated in the past for cluster  Now just weighing rapidly increasing meds vs possible side effects of BP dropping, she has cuff at home  Also recommended sleep study as TOBIAS may be contributing    - as of 8/18/2022: She reports no improvement and worsening when she tried depakote 1000 mg for 5 nights, verapamil has already helped (along with continuing emgality 300 mg monthly, amitriptyline 50 mg) and will continue titration up from 120 mg BID where she had 4 days no headaches, last 2 days 1 cluster headache each day that resolves with triptan  Titration gradually discussed if needed up to 120 mg TID with room to go higher if needed and no SE  Workup:  - sleep study ordered by Pulm 8/5/22 and scheduled for 1/20/23  - Neurologic assessment reveals normal neurological exam, except for anisocoria with right pupil larger than left  -  MRI brain with without contrast 02/07/2020: No acute disease   Punctate pontine capillary telangiectasia    No orbital pathology      Preventative:  - we discussed headache hygiene and lifestyle factors that may improve headaches  -  amitriptyline 50 mg nightly  Discussed proper use, possible side effects and risks  - continue  emgality - SubQ: 300 mg at the onset of the cluster period and then once monthly until the end of the cluster period  -     verapamil (CALAN) gradual titration up to 120 mg TID, stay on lowest effective and tolerated dose  Discussed proper use, possible side effects and risks    - of note through other providers she is on daily meloxicam which is helped with her head chronic hip pain  - past/failed:  Carbamazepine, Depakote 500 mg and 1000 mg did not help in fact made headaches worse, topiramate, gabapentin, metoprolol, lyrica, verapamil, oxcarbazepine      Abortive:  - discussed not taking over-the-counter or prescription pain medications more than 3 days per week to prevent medication overuse/rebound headache  - Relpax/eletriptan helps  Discussed proper use, possible side effects and risks   -past:  Steroids helped in the past but lately has not helped, sumatriptan p o  Nasal helped for a little while and was too expensive, Depakote 500 mg and 1000 mg did not help in fact made headaches worse, rizatriptan she believes was helpful but too expensive - typically the pain starts too fast and ends before she is able to take something            Patient instructions         Headache/migraine treatment:   Abortive medications (for immediate treatment of a headache): It is ok to take ibuprofen, acetaminophen or naproxen (Advil, Tylenol,  Aleve, Excedrin) if they help your headaches you should limit these to No more than 3 times a week to avoid medication overuse/rebound headaches       For your more moderate to severe migraines take this medication early   Eletriptan 40mg tabs - take one at the onset of headache  May repeat one time after 1-2 hours if pain has not resolved  Max 2 a day        Prescription preventive medications for headaches/migraines   (to take every day to help prevent headaches - not to take at the time of headache):  [x]? ? amitriptyline continue 50 mg nightly  emgality 300 mg monthly      verapamil -120 mg in am and in pm and add 40 mg mid day for 3 days, then if needed increase to 120/80/120 then if needed 120 three times a day   stay on lowest effective and tolerated dose       *Typically these types of medications take time untill you see the benefit, although some may see improvement in days, often it may take weeks, especially if the medication is being titrated up to a beneficial level  Please contact us if there are any concerns or questions regarding the medication          Lifestyle Recommendations:  [x]? ? SLEEP - Maintain a regular sleep schedule: Adults need at least 7-8 hours of uninterrupted a night   Maintain good sleep hygiene:  Going to bed and waking up at consistent times, avoiding excessive daytime naps, avoiding caffeinated beverages in the evening, avoid excessive stimulation in the evening and generally using bed primarily for sleeping   One hour before bedtime would recommend turning lights down lower, decreasing your activity (may read quietly, listen to music at a low volume)  When you get into bed, should eliminate all technology (no texting, emailing, playing with your phone, iPad or tablet in bed)  [x]?? HYDRATION - Maintain good hydration   Drink  2L of fluid a day (4 typical small water bottles)  [x]? ? DIET - Maintain good nutrition  In particular don't skip meals and try and eat healthy balanced meals regularly  [x]? ? TRIGGERS - Look for other triggers and avoid them: Limit caffeine to 1-2 cups a day or less  Avoid dietary triggers that you have noticed bring on your headaches (this could include aged cheese, peanuts, MSG, aspartame and nitrates)  [x]? ? EXERCISE - physical exercise as we all know is good for you in many ways, and not only is good for your heart, but also is beneficial for your mental health, cognitive health and  chronic pain/headaches  I would encourage at the least 5 days of physical exercise weekly for at least 30 minutes       Education and Follow-up  [x]? ? Please call with any questions or concerns  Of course if any new concerning symptoms go to the emergency department  [x]? ? Follow up 2 weeks, sooner if needed        CC: We had the pleasure of evaluating Keesha Walker in neurological consultation today  Mace He a   right handed female who presents today for evaluation of headaches       History obtained from patient as well as available medical record review    History of Present Illness:   Interval history as of 8/18/2022  - denies any new or concerning neurologic symptoms since last visit   - sleep study ordered by Pulm 8/5/22 and scheduled for 1/20/23  - able to work through this, doesn't need work note     Headaches and migraines   stuck in her cluster headache cycle, but seems to be having some improvement    While taking the depakote on 1000 mg headaches were worse after 5 nights  Then started verapamil and then on day 5 and 6 took 120 mg BID and no headaches for 4 days, then 8/16/22 and 8/17/22 1 headache each day that improved with relpax     Preventative:    - next emgality 300 mg dose should be by 8/27/22  - trial of depakote 1-2 tabs nightly - made worse  - amitriptyline 50 mg nightly   - trial of re adding verapamil up to 120 mg BID helps     Blood pressure not dropping, no SE    Abortive:   - relpax /eletriptan   Denies bothersome side effects     Interval history as of 8/4/2022  - she was to follow up in a year and is now following up 1 5 years later due to return of headaches  - retiring at the end of the year and nervous about it     Headaches and migraines    - ran out of amitriptyline mid May due to pharmacy issue and missed one week and started to feel like she may have headaches again 5/18/22 but then didn't get them  - 7/24/22 msg reporting headaches for the past week, I received the message from staff 2 days later and called her and we added emgality 300 mg monthly for cluster  - 8/2/22 called reporting headaches continue after improving for 2-3 days, decadron added and on 3rd day   - still having headaches daily now, about 2 times a day, lasting about 20 minutes, not as bad as previous years, still crying at the end and trying not to since it makes it feel worse, pressure helps on the region  - right temporal and jaw and behind the eye and less so in the teeth, no vision changes    Preventative:   - emgality 300 mg monthly 7/27/22 - belly no issues   - amitriptyline 50 mg nightly - ran out mid May due to pharmacy issue and missed one week and started to have headaches again 5/18/22    Abortive:   - eletriptan/relpax  - hydrocodone for back sometimes - not taking   Denies bothersome side effects     -----------------   - seeing pulmonary tomorrow, up every 2 hours, snores   How likely a you to doze off or fall sleep during the following situations?   0 - would never doze  1 - slight chance of dozing  2 - moderate chance of dosing  3 - high chance of dozing    [place] sleepiness scale - 11  Sitting and reading - 2  Watching TV - 3  Sitting, inactive in a public place such as a theater 0  As a passenger in a car for an hour without a break -0   Lying down to rest in afternoon when circumstances permit - 3  Sitting and talking to someone - 0  Sitting quietly after lunch without alcohol 3  In a car while stopped for few minutes in traffic - 0      Interval history as of 02/26/2021   - dx COVID last weds   - has not smoked in a year     She reports she has not had a significant headache or migraine in over a year   (she had these headaches about 10 years and thinks menopaused ended a few years ago, but we discussed they may have been hormonally related and maybe she is done with them now)  Preventative: Amitriptyline 50 mg nightly  Abortive:  Eletriptan     Interval history as of 9/11/2020:  - just had hip surgery in 8/2020  - forgot to take amitriptyline 50 mg for past week - will restart  - no headaches since we last talked         Interval history as of 5/29/2020:   -  MRI brain with without contrast 02/07/2020: No acute disease   Punctate pontine capillary telangiectasia    No orbital pathology      Headaches -none      She called in 03/10/2020 wanted to decrease Lyrica on verapamil, Amitriptyline-attempted to wean off but patient feel better on 25 mg nightly      Lyrica - weaned herself off  Verapamil - weaned herself off  Amitriptyline - 50 mg and doing great, sleeping wonderful 10-8      relpax not needed     Headaches started at what age? Late 26s years old  How often do the headaches occur?   - as of 1/24/2020: 3 months at a time headaches daily, none in 6 weeks   - As of 05/29/2020: no headaches since last time, weaned herself off of lyrica and verapamil and just on amitriptyline 50 mg nightly and doing well   What time of the day do the headaches start? only at night,  most of the time during sleep or before she sleeps at night  How long do the headaches last? 15 mins to an hour - 3 times at night and 2 times before bed - worsens until it gets to a point where it blows up and then goes away   Are you ever headache free? Yes     Aura? without aura     Where is your headache located and pain quality?   - starts with right teeth pain and feels it coming   - usually right frontal and right retroorbital - stabbing  - midfrontal lately - sharp, electtical  What is the intensity of pain? Average: 15/10  Associated symptoms:   []?? Nausea       []? ? Vomiting        []? ?Renelda Snow  [x]? ? Insomnia    []? ? Stiff or sore neck   [x]? ? Problems with concentration  [x]? ? Photophobia     [x]? ?Phonophobia      []? ? Osmophobia  []? ? Blurred vision   [x]? ? Prefer quiet, dark room  []? ? Light-headed or dizzy     []? ? Tinnitus    []? ? Hands or feet tingle or feel numb/paresthesias       []? ? Red ear      []? ? Ptosis      []? ? Facial droop  [x]? ? Lacrimation - right tearing  [x]? ? Nasal congestion  []? ? Flushing of face  []? ? Change in pupil size     Number of days missed per month because of headaches:  Work (or school) days: 0     Things that make the headache worse? No specific movements, any movement      Headache triggers:  Unknown   What time of the year do headaches occur more frequently?   are usually worse winter but has also had in July     Have you seen someone else for headaches or pain? Yes, Dr Moose Wall  Have you had trigger point injection performed and how often? No  Have you had Botox injection performed and how often? No   Have you had epidural injections or transforaminal injections performed? No  Are you current pregnant or planning on getting pregnant? No   Have you ever had any Brain imaging? Yes multiple normal      What medications do you take or have you taken for your headaches?    ABORTIVE:    OTC medications have been ineffective         Relpax - 12/month - if takes before bed able to sleep for 4 hours -      Has norco for back once a year when back goes out      Past:  Steroids helped in the past, but last couple bathes lasted a day only  Sumatriptan PO and nasal helped for a little   Rizatriptan - was helpful and too expensive      PREVENTIVE:      - amitriptyline 25 mg - been on for years - up to 50 in cycle   For hip pain: meloxicam      Past:  - Verapamil 240 mg daily (in past 240 +120)   - pregabalin 75 mg daily- started 1 week before being 6 weeks headache free        Past/failed:   carbamazepine - did not help  depakote did not help   topiramate 100 mg over 3 months   Gabapentin helped in the past and not recently  Metoprolol        LIFESTYLE  Sleep   - averages: normally sleeps well     Physical activity: none      Water: 0 per day  Caffeine: 8 cups per day  Diet:  overeating     Mood:   Denies history of anxiety or depression or other diagnosed mood disorder     The following portions of the patient's history were reviewed and updated as appropriate: allergies, current medications, past family history, past medical history, past social history, past surgical history and problem list      Pertinent family history:  Family history of headaches:  Migraines in daughter, sister and mother  Any family history of aneurysms - No     Pertinent social history:  [place]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  [address]  Lives with      Illicit Drugs: denies  Alcohol/tobacco: quiting tobacco - 5 cigs improvement, alcohol no       Past Medical History:   Diagnosis Date    Asymptomatic postmenopausal status     Back pain, lumbosacral     Cellulitis of left lower extremity     last assessed    3/30/16    resolved  [place]   9/8/16      Cluster headaches     Colon polyps     CTS (carpal tunnel syndrome) 3 years ago    Dense breasts     Foot pain, left     Head injury     Dec 2017 no LOC    Hip pain, right     Lumbosacral pain     Lymph edema     Nosebleed     Right hip pain     Spider veins     Tendinitis of right shoulder     Varicose veins of legs        Past Surgical History:   Procedure Laterality Date    BACK SURGERY      type unknown- occurred during vaginal delivery    BREAST BIOPSY Left     HAND TENDON SURGERY      MN REVISE MEDIAN N/CARPAL TUNNEL SURG Right 09/12/2016    Procedure: WRIST OPEN CARPAL TUNNEL RELEASE ;  Surgeon: Andra Manzanares MD;  [facility];  Service: Orthopedics    SINUS SURGERY      TOTAL HIP ARTHROPLASTY Right 08/24/2020    TUBAL LIGATION      VASCULAR SURGERY  various dates    VEIN LIGATION AND STRIPPING Left        Current Outpatient Medications   Medication Sig Dispense Refill    amitriptyline (ELAVIL) 25 mg tablet Take 2 tablets (50 mg total) by mouth daily at bedtime 90 tablet 4    eletriptan (Relpax) 40 MG tablet Take 1 tablet (40 mg total) by mouth once as needed for migraine may repeat in 2 hours if necessary  Max 2 doses per 24 hours  18 tablet 1    Galcanezumab-gnlm (Emgality, 300 MG Dose,) 100 MG/ML SOSY Inject 3 mL (300 mg total) under the skin every 30 (thirty) days Until cluster cycle stops 3 mL 3    Naproxen Sodium 220 MG CAPS Take 2 capsules by mouth if needed      verapamil (CALAN) 120 mg tablet in addition to separate prescription for 40 mg tabs  Take 120 mg BID with 40 mg mid day for 3 days, then if needed/tolerated 120 mg BID with 80 mg mid day for 3 days, then if needed 120 mg TID 60 tablet 5     No current facility-administered medications for this visit          No Known Allergies      Objective:     Exam limited by Video  Physical Exam:                                                                 Vitals:            Constitutional:    LMP 01/01/2015   BP Readings from Last 3 Encounters:   08/05/22 138/80   04/27/22 128/68   03/23/22 130/80     Pulse Readings from Last 3 Encounters:   08/05/22 102   04/27/22 96   09/27/21 92         Well developed, well nourished, No dysmorphic features  HEENT:  Normocephalic atraumatic  See neuro exam   Psychiatric:  Normal behavior and appropriate affect        Neurological Examination:     Mental status/cognitive function:   Recent and remote memory appear intact  Attention span and concentration as well as fund of knowledge appear appropriate for age  Normal language and spontaneous speech  Cranial Nerves:  VII-facial expression symmetric  Motor Exam: symmetric bulk throughout  no atrophy, fasciculations or abnormal movements noted  Coordination:  no apparent dysmetria, ataxia or tremor noted    Pertinent lab results:   See EMR for recent labs  06/21/2022 TSH and free T4 normal    CMP and CBC unremarkable except for slightly elevated alkaline phosphatase  01/28/2020 CMP significant for sodium 147, B12 260    10/29/2019 CMP and CBC unremarkable, TSH normal     06/08/2016 B12 283     Imaging:   MRI of the brain around 4 years ago normal per patient      MRA head 01/21/2015:  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   No evidence of intracranial aneurysm, AVM, cortical cerebral arterial stenosis or occlusion  - follow up CT scan was normal, no cyst      Review of Systems:   ROS obtained by medical assistant Personally reviewed and updated if indicated  I recommended PCP follow up for non neurologic problems  Review of Systems   Constitutional: Negative for appetite change and fever  HENT: Negative  Negative for hearing loss, tinnitus, trouble swallowing and voice change  Eyes: Negative  Negative for photophobia and pain  Respiratory: Negative  Negative for shortness of breath  Cardiovascular: Negative  Negative for palpitations  Gastrointestinal: Negative  Negative for nausea and vomiting  Endocrine: Negative  Negative for cold intolerance  Genitourinary: Negative  Negative for dysuria, frequency and urgency  Musculoskeletal: Negative  Negative for myalgias and neck pain  Skin: Negative  Negative for rash  Neurological: Positive for headaches  Negative for dizziness, tremors, seizures, syncope, facial asymmetry, speech difficulty, weakness, light-headedness and numbness  Hematological: Negative  Does not bruise/bleed easily  Psychiatric/Behavioral: Positive for sleep disturbance  Negative for confusion and hallucinations  All other systems reviewed and are negative  I have spent approximately 10 minutes with Patient today in which greater than 50% of this time was spent in counseling/coordination of care regarding Prognosis, Risks and benefits of tx options, Intructions for management and Impressions  I also spent 22 minutes non face to face for this patient the same day

## 2022-08-24 ENCOUNTER — TELEPHONE (OUTPATIENT)
Dept: NEUROLOGY | Facility: CLINIC | Age: 57
End: 2022-08-24

## 2022-08-24 NOTE — TELEPHONE ENCOUNTER
Called and spoke to patient to confirm their upcoming appointment with Dr Samuel Burris  Informed patient about arriving in the [place] location 15 minutes prior to their appointment to get checked in and going over chart

## 2022-08-29 ENCOUNTER — TELEPHONE (OUTPATIENT)
Dept: NEUROLOGY | Facility: CLINIC | Age: 57
End: 2022-08-29

## 2022-08-29 NOTE — TELEPHONE ENCOUNTER
We have no evidence to suggest the emgality would be causing worsening lymphedema, but of course anything is possible  I will discuss in detail with her this week at upcoming visit  The waking up could be sleep apnea, glad sleep study is coming up  I do not have anything to add before 3 days from now, but I am glad she reached out just in case  We can further discuss benefit/risk ratio of each med as well

## 2022-08-29 NOTE — TELEPHONE ENCOUNTER
Pt left a vm today at 0945 requesting a cb [phone]    Called pt and states that she injected second shot of emgality on 8/27/22 in her stomach  She has lymphedema in her legs and it swelled up last night  Last night she woke up 10x to go to the bathroom  She wanted to know if emgality is the cause of her worsening lymphema  States that she did not had any issue with the first dose of emgality  She just wanted to make sure that emgality is not causing her symptoms  States that she gets a nerve pain that starts in her teeth then radiates above her right eye  This has been going on for the past 6 weeks  Denies symptoms right now  States that symptoms only occurs at night  Pain scale: when she gets it, rating 10/10    Associated symptoms:sonophobia, photophobia    Precipitating factors: unknown     Current migraine medications are   Verapamil 120 mg tid  Amitriptyline 25 mg 2 tabs at bedtime  emgality 300 mg monthly  Next dose 9/27/22  Relpax 40 mg prn as needed  She has been taking 1 tab every night for a month now bec she has a episodic migraines  Last dose last night around 7 pm which helped  Advised pt that we don't recommend she take any triptan more than 3 days in any 1 week due to CVA risk with overuse  Pt verbalized understanding  Medications tried in the past? Tried decadron and depakote in the past but not effective  Not tried olanzapine     States that she has upcoming appt w/ you on 9/1/22  If no other recommendation  She will discuss her symptoms w/ you at her appt      cb [phone], ok to leave a detailed message

## 2022-09-01 ENCOUNTER — TELEMEDICINE (OUTPATIENT)
Dept: NEUROLOGY | Facility: CLINIC | Age: 57
End: 2022-09-01
Payer: COMMERCIAL

## 2022-09-01 DIAGNOSIS — G44.029 CHRONIC CLUSTER HEADACHE, NOT INTRACTABLE: Primary | ICD-10-CM

## 2022-09-01 PROCEDURE — 99213 OFFICE O/P EST LOW 20 MIN: CPT | Performed by: PSYCHIATRY & NEUROLOGY

## 2022-09-01 NOTE — PATIENT INSTRUCTIONS
Headache/migraine treatment:   Abortive medications (for immediate treatment of a headache): It is ok to take ibuprofen, acetaminophen or naproxen (Advil, Tylenol,  Aleve, Excedrin) if they help your headaches you should limit these to No more than 3 times a week to avoid medication overuse/rebound headaches  For your more moderate to severe migraines take this medication early   Eletriptan 40mg tabs - take one at the onset of headache  May repeat one time after 1-2 hours if pain has not resolved  Max 2 a day    Prescription preventive medications for headaches/migraines   (to take every day to help prevent headaches - not to take at the time of headache):  [x] amitriptyline continue 50 mg nightly  emgality 300 mg monthly      verapamil - 120 three times a day and if headaches return increase to 120 mg in am 120 mg mid day and 240 mg at night (if needed or we discussed could add 40 mg every few days if not severe if BP will tolerate)  stay on lowest effective and tolerated dose  *Typically these types of medications take time untill you see the benefit, although some may see improvement in days, often it may take weeks, especially if the medication is being titrated up to a beneficial level  Please contact us if there are any concerns or questions regarding the medication  Lifestyle Recommendations:  [x] SLEEP - Maintain a regular sleep schedule: Adults need at least 7-8 hours of uninterrupted a night  Maintain good sleep hygiene:  Going to bed and waking up at consistent times, avoiding excessive daytime naps, avoiding caffeinated beverages in the evening, avoid excessive stimulation in the evening and generally using bed primarily for sleeping  One hour before bedtime would recommend turning lights down lower, decreasing your activity (may read quietly, listen to music at a low volume)   When you get into bed, should eliminate all technology (no texting, emailing, playing with your phone, iPad or tablet in bed)  [x] HYDRATION - Maintain good hydration  Drink  2L of fluid a day (4 typical small water bottles)  [x] DIET - Maintain good nutrition  In particular don't skip meals and try and eat healthy balanced meals regularly  [x] TRIGGERS - Look for other triggers and avoid them: Limit caffeine to 1-2 cups a day or less  Avoid dietary triggers that you have noticed bring on your headaches (this could include aged cheese, peanuts, MSG, aspartame and nitrates)  [x] EXERCISE - physical exercise as we all know is good for you in many ways, and not only is good for your heart, but also is beneficial for your mental health, cognitive health and  chronic pain/headaches  I would encourage at the least 5 days of physical exercise weekly for at least 30 minutes  Education and Follow-up  [x] Please call with any questions or concerns  Of course if any new concerning symptoms go to the emergency department    [x] Follow up 3 weeks on 9/22/22 at 1 pm, sooner if needed

## 2022-09-01 NOTE — PROGRESS NOTES
Virtual Regular Visit    Verification of patient location:    Patient is located in the following state in which I hold an active license [place]      Assessment/Plan:    Problem List Items Addressed This Visit        Nervous and Auditory    Cluster headaches - Primary               Reason for visit is   Chief Complaint   Patient presents with    Virtual Regular Visit        Encounter provider Marie Yañez MD    Provider located at [address] [place] [address] [facility]  [phone]      Recent Visits  No visits were found meeting these conditions  Showing recent visits within past 7 days and meeting all other requirements  Today's Visits  Date Type Provider Dept   09/01/22 Telemedicine Marie Yañez MD [facility] [address] [facility] today's visits and meeting all other requirements  Future Appointments  No visits were found meeting these conditions  Showing future appointments within next 150 days and meeting all other requirements       The patient was identified by name and date of birth  Mayank Youngblood was informed that this is a telemedicine visit and that the visit is being conducted through  [address] and patient was informed this is a secure, HIPAA-complaint platform  She agrees to proceed     My office door was closed  The patient was notified the following individuals were present in the room Med student Nabeel Jennings  She acknowledged consent and understanding of privacy and security of the video platform  The patient has agreed to participate and understands they can discontinue the visit at any time  Patient is aware this is a billable service       Subjective    Assessment/Plan:   Juanis Walker is a delightful 57 y o  female with a past medical history that includes cluster headaches, chronic venous insufficiency, lymphedema, chronic back pain on and off, hip arthritis, obesity referred here for evaluation of headache  She has followed with my neurology colleagues in the past  My initial visit 01/24/2020     Chronic cluster headache, not intractable  Anisocoria  Patient reports a long history of chronic headaches since her 35s    She reports her headaches, in clusters typically daily for 3 months at a time and occur in the evenings  Gerda Jarvis can last anywhere from 15 minutes up to an hour and may happen 3 times at night and 2 times before bed  Gerda Jarvis tend to build until they reach maximal intensity and then resolve   She reports may start with right teeth pain and then become right frontal and right retro-orbital stabbing pain although the most recent episode was mid frontal sharp electrical   Pain is severe   She reports associated features include photophobia, photophobia, right eye lacrimation, nasal congestion    - as of 1/24/2020: 3 months at a time will have daily headaches  - As of 05/29/2020: no headaches since last time, weaned herself off of lyrica and verapamil and just on amitriptyline 50 mg nightly and doing well  Computer read of recent EKG showed likely incorrect abnormalities since she was told it was normal, just want her PCP to look over it to make sure this is the case while on amitriptyline    - As of 9/11/2020:  No headaches since last visit, accidentally stop taking amitriptyline 50 mg nightly for 1 week and will restart, refilled today  We discussed plan for next time she has a cluster headache cycle - plan to start emgality - she will call in right went cycle starts  - as of 2/26/2021:  She has not had significant headache in the past year on amitriptyline 50 mg nightly  - as of 8/4/2022: after years of doing very well, cluster returned mid July  Started emgality 300 mg monthly for cluster headache 7/27/22 which helped a few days and may help more so continue  Decadron has not helped  Will add depakote and if needed also verapamil, both of which she has tolerated in the past for cluster   Now just weighing rapidly increasing meds vs possible side effects of BP dropping, she has cuff at home  Also recommended sleep study as TBOIAS may be contributing    - as of 8/18/2022: She reports no improvement and worsening when she tried depakote 1000 mg for 5 nights, verapamil has already helped (along with continuing emgality 300 mg monthly, amitriptyline 50 mg) and will continue titration up from 120 mg BID where she had 4 days no headaches, last 2 days 1 cluster headache each day that resolves with triptan  Titration gradually discussed if needed up to 120 mg TID with room to go higher if needed and no SE    - as of 9/1/2022:  She reports improvement since last visit on current medication combination with no cluster headaches the last 3 days  On amitriptyline 50 mg, emgality 300 mg monthly, verapamil 120 mg t i d  and we discussed room to gradually increase verapamil if headaches return as long as blood pressure tolerates  Workup:  - sleep study ordered by Pulm 8/5/22 and scheduled for 1/20/23  - Neurologic assessment reveals normal neurological exam, except for anisocoria with right pupil larger than left  -  MRI brain with without contrast 02/07/2020: No acute disease   Punctate pontine capillary telangiectasia    No orbital pathology      Preventative:  - we discussed headache hygiene and lifestyle factors that may improve headaches  -  amitriptyline 50 mg nightly  Discussed proper use, possible side effects and risks  - continue  emgality - SubQ: 300 mg at the onset of the cluster period and then once monthly until the end of the cluster period  -     verapamil (CALAN) gradual titration up to 120 mg TID and we discussed continued gradual titration instructions if needed, stay on lowest effective and tolerated dose  Discussed proper use, possible side effects and risks    - of note through other providers she is on daily meloxicam which is helped with her head chronic hip pain  - past/failed:  Carbamazepine, Depakote 500 mg and 1000 mg did not help in fact made headaches worse, topiramate, gabapentin, metoprolol, lyrica, verapamil, oxcarbazepine      Abortive:  - discussed not taking over-the-counter or prescription pain medications more than 3 days per week to prevent medication overuse/rebound headache  - Relpax/eletriptan helps  Discussed proper use, possible side effects and risks   -past:  Steroids helped in the past but lately has not helped, sumatriptan p o  Nasal helped for a little while and was too expensive, Depakote 500 mg and 1000 mg did not help in fact made headaches worse, rizatriptan she believes was helpful but too expensive - typically the pain starts too fast and ends before she is able to take something            Patient instructions         Headache/migraine treatment:   Abortive medications (for immediate treatment of a headache): It is ok to take ibuprofen, acetaminophen or naproxen (Advil, Tylenol,  Aleve, Excedrin) if they help your headaches you should limit these to No more than 3 times a week to avoid medication overuse/rebound headaches       For your more moderate to severe migraines take this medication early   Eletriptan 40mg tabs - take one at the onset of headache  May repeat one time after 1-2 hours if pain has not resolved  Max 2 a day    Prescription preventive medications for headaches/migraines   (to take every day to help prevent headaches - not to take at the time of headache):  [x]? ? amitriptyline continue 50 mg nightly  emgality 300 mg monthly      verapamil - 120 three times a day and if headaches return increase to 120 mg in am 120 mg mid day and 240 mg at night (if needed or we discussed could add 40 mg every few days if not severe if BP will tolerate)  stay on lowest effective and tolerated dose      *Typically these types of medications take time untill you see the benefit, although some may see improvement in days, often it may take weeks, especially if the medication is being titrated up to a beneficial level  Please contact us if there are any concerns or questions regarding the medication          Lifestyle Recommendations:  [x]? ? SLEEP - Maintain a regular sleep schedule: Adults need at least 7-8 hours of uninterrupted a night  Maintain good sleep hygiene:  Going to bed and waking up at consistent times, avoiding excessive daytime naps, avoiding caffeinated beverages in the evening, avoid excessive stimulation in the evening and generally using bed primarily for sleeping   One hour before bedtime would recommend turning lights down lower, decreasing your activity (may read quietly, listen to music at a low volume)  When you get into bed, should eliminate all technology (no texting, emailing, playing with your phone, iPad or tablet in bed)  [x]?? HYDRATION - Maintain good hydration   Drink  2L of fluid a day (4 typical small water bottles)  [x]? ? DIET - Maintain good nutrition  In particular don't skip meals and try and eat healthy balanced meals regularly  [x]? ? TRIGGERS - Look for other triggers and avoid them: Limit caffeine to 1-2 cups a day or less  Avoid dietary triggers that you have noticed bring on your headaches (this could include aged cheese, peanuts, MSG, aspartame and nitrates)  [x]? ? EXERCISE - physical exercise as we all know is good for you in many ways, and not only is good for your heart, but also is beneficial for your mental health, cognitive health and  chronic pain/headaches  I would encourage at the least 5 days of physical exercise weekly for at least 30 minutes       Education and Follow-up  [x]? ? Please call with any questions or concerns  Of course if any new concerning symptoms go to the emergency department  [x]? ? Follow up 3 weeks on 9/22/22 at 1 pm, sooner if needed        CC: We had the pleasure of evaluating Keesha Walker in neurological consultation today   Noman Riky a   right handed female who presents today for evaluation of headaches       History obtained from patient as well as available medical record review    History of Present Illness:   Interval history as of 9/1/2022  - denies any new or concerning neurologic symptoms since last visit     Headaches and migraines   - when she got off the computer with me two weeks ago got a headache, one day without, then slight headache, then last 3 nights has not had any  - she called 8/29/22 because her lymphedema was worse   - just a couple     Preventative:   amitriptyline 50 mg nightly  emgality 300 mg monthly    verapamil - after last visit gradually increased to 120 mg in am and in pm and add 40 mg mid day for 3 days, then if needed increase to 120/80/120 then if needed 120 three times a day - feels fine without symptoms    Abortive: eletriptan  Denies bothersome side effects    Interval history as of 8/18/2022  - denies any new or concerning neurologic symptoms since last visit   - sleep study ordered by Pulm 8/5/22 and scheduled for 1/20/23  - able to work through this, doesn't need work note     Headaches and migraines   stuck in her cluster headache cycle, but seems to be having some improvement    While taking the depakote on 1000 mg headaches were worse after 5 nights  Then started verapamil and then on day 5 and 6 took 120 mg BID and no headaches for 4 days, then 8/16/22 and 8/17/22 1 headache each day that improved with relpax     Preventative:    - next emgality 300 mg dose should be by 8/27/22  - trial of depakote 1-2 tabs nightly - made worse  - amitriptyline 50 mg nightly   - trial of re adding verapamil up to 120 mg BID helps     Blood pressure not dropping, no SE    Abortive:   - relpax /eletriptan   Denies bothersome side effects     Interval history as of 8/4/2022  - she was to follow up in a year and is now following up 1 5 years later due to return of headaches  - retiring at the end of the year and nervous about it     Headaches and migraines    - ran out of amitriptyline mid May due to pharmacy issue and missed one week and started to feel like she may have headaches again 5/18/22 but then didn't get them  - 7/24/22 msg reporting headaches for the past week, I received the message from staff 2 days later and called her and we added emgality 300 mg monthly for cluster  - 8/2/22 called reporting headaches continue after improving for 2-3 days, decadron added and on 3rd day   - still having headaches daily now, about 2 times a day, lasting about 20 minutes, not as bad as previous years, still crying at the end and trying not to since it makes it feel worse, pressure helps on the region  - right temporal and jaw and behind the eye and less so in the teeth, no vision changes    Preventative:   - emgality 300 mg monthly 7/27/22 - belly no issues   - amitriptyline 50 mg nightly - ran out mid May due to pharmacy issue and missed one week and started to have headaches again 5/18/22    Abortive:   - eletriptan/relpax  - hydrocodone for back sometimes - not taking   Denies bothersome side effects     -----------------   - seeing pulmonary tomorrow, up every 2 hours, snores   How likely a you to doze off or fall sleep during the following situations?   0 - would never doze  1 - slight chance of dozing  2 - moderate chance of dosing  3 - high chance of dozing    Hayward sleepiness scale - 11  Sitting and reading - 2  Watching TV - 3  Sitting, inactive in a public place such as a theater 0  As a passenger in a car for an hour without a break -0   Lying down to rest in afternoon when circumstances permit - 3  Sitting and talking to someone - 0  Sitting quietly after lunch without alcohol 3  In a car while stopped for few minutes in traffic - 0      Interval history as of 02/26/2021   - dx COVID last weds   - has not smoked in a year     She reports she has not had a significant headache or migraine in over a year   (she had these headaches about 10 years and thinks menopaused ended a few years ago, but we discussed they may have been hormonally related and maybe she is done with them now)  Preventative: Amitriptyline 50 mg nightly  Abortive:  Eletriptan     Interval history as of 9/11/2020:  - just had hip surgery in 8/2020  - forgot to take amitriptyline 50 mg for past week - will restart  - no headaches since we last talked         Interval history as of 5/29/2020:   -  MRI brain with without contrast 02/07/2020: No acute disease   Punctate pontine capillary telangiectasia    No orbital pathology      Headaches -none      She called in 03/10/2020 wanted to decrease Lyrica on verapamil, Amitriptyline-attempted to wean off but patient feel better on 25 mg nightly      Lyrica - weaned herself off  Verapamil - weaned herself off  Amitriptyline - 50 mg and doing great, sleeping wonderful 10-8      relpax not needed     Headaches started at what age? Late 26s years old  How often do the headaches occur?   - as of 1/24/2020: 3 months at a time headaches daily, none in 6 weeks   - As of 05/29/2020: no headaches since last time, weaned herself off of lyrica and verapamil and just on amitriptyline 50 mg nightly and doing well   What time of the day do the headaches start? only at night,  most of the time during sleep or before she sleeps at night  How long do the headaches last? 15 mins to an hour - 3 times at night and 2 times before bed - worsens until it gets to a point where it blows up and then goes away   Are you ever headache free? Yes     Aura? without aura     Where is your headache located and pain quality?   - starts with right teeth pain and feels it coming   - usually right frontal and right retroorbital - stabbing  - midfrontal lately - sharp, electtical  What is the intensity of pain? Average: 15/10  Associated symptoms:   []?? Nausea       []? ? Vomiting        []? ?[place]  [x]? ? Insomnia    []? ? Stiff or sore neck   [x]? ? Problems with concentration  [x]? ? Photophobia     [x]? ?Phonophobia      []? ? Osmophobia  []? ? Blurred vision   [x]? ? Prefer quiet, dark room  []? ? Light-headed or dizzy     []? ? Tinnitus    []? ? Hands or feet tingle or feel numb/paresthesias       []? ? Red ear      []? ? Ptosis      []? ? Facial droop  [x]? ? Lacrimation - right tearing  [x]? ? Nasal congestion  []? ? Flushing of face  []? ? Change in pupil size     Number of days missed per month because of headaches:  Work (or school) days: 0     Things that make the headache worse? No specific movements, any movement      Headache triggers:  Unknown   What time of the year do headaches occur more frequently?   are usually worse winter but has also had in July     Have you seen someone else for headaches or pain? Yes, Dr Vigil Ar  Have you had trigger point injection performed and how often? No  Have you had Botox injection performed and how often? No   Have you had epidural injections or transforaminal injections performed? No  Are you current pregnant or planning on getting pregnant? No   Have you ever had any Brain imaging? Yes multiple normal      What medications do you take or have you taken for your headaches?    ABORTIVE:    OTC medications have been ineffective         Relpax - 12/month - if takes before bed able to sleep for 4 hours -      Has norco for back once a year when back goes out      Past:  Steroids helped in the past, but last couple bathes lasted a day only  Sumatriptan PO and nasal helped for a little   Rizatriptan - was helpful and too expensive      PREVENTIVE:      - amitriptyline 25 mg - been on for years - up to 50 in cycle   For hip pain: meloxicam      Past:  - Verapamil 240 mg daily (in past 240 +120)   - pregabalin 75 mg daily- started 1 week before being 6 weeks headache free        Past/failed:   carbamazepine - did not help  depakote did not help   topiramate 100 mg over 3 months   Gabapentin helped in the past and not recently  Metoprolol        LIFESTYLE  Sleep   - averages: normally sleeps well     Physical activity: none      Water: 0 per day  Caffeine: 8 cups per day  Diet:  overeating     Mood:   Denies history of anxiety or depression or other diagnosed mood disorder     The following portions of the patient's history were reviewed and updated as appropriate: allergies, current medications, past family history, past medical history, past social history, past surgical history and problem list      Pertinent family history:  Family history of headaches:  Migraines in daughter, sister and mother  Any family history of aneurysms - No     Pertinent social history:  Elicia Yanez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  [address]  Lives with      Illicit Drugs: denies  Alcohol/tobacco: quiting tobacco - 5 cigs improvement, alcohol no     Past Medical History:   Diagnosis Date    Asymptomatic postmenopausal status     Back pain, lumbosacral     Cellulitis of left lower extremity     last assessed    3/30/16    resolved  [place]   9/8/16      Cluster headaches     Colon polyps     CTS (carpal tunnel syndrome) 3 years ago    Dense breasts     Foot pain, left     Head injury     Dec 2017 no LOC    Hip pain, right     Lumbosacral pain     Lymph edema     Nosebleed     Right hip pain     Spider veins     Tendinitis of right shoulder     Varicose veins of legs        Past Surgical History:   Procedure Laterality Date    BACK SURGERY      type unknown- occurred during vaginal delivery    BREAST BIOPSY Left     HAND TENDON SURGERY      WA REVISE MEDIAN N/CARPAL TUNNEL SURG Right 09/12/2016    Procedure: WRIST OPEN CARPAL TUNNEL RELEASE ;  Surgeon: Chelsie Wilcox MD;  [facility];  Service: Orthopedics    SINUS SURGERY      TOTAL HIP ARTHROPLASTY Right 08/24/2020    TUBAL LIGATION      VASCULAR SURGERY  various dates    VEIN LIGATION AND STRIPPING Left        Current Outpatient Medications   Medication Sig Dispense Refill    amitriptyline (ELAVIL) 25 mg tablet Take 2 tablets (50 mg total) by mouth daily at bedtime 90 tablet 4    eletriptan (Relpax) 40 MG tablet Take 1 tablet (40 mg total) by mouth once as needed for migraine may repeat in 2 hours if necessary  Max 2 doses per 24 hours  18 tablet 1    Galcanezumab-gnlm (Emgality, 300 MG Dose,) 100 MG/ML SOSY Inject 3 mL (300 mg total) under the skin every 30 (thirty) days Until cluster cycle stops 3 mL 3    Naproxen Sodium 220 MG CAPS Take 2 capsules by mouth if needed      verapamil (CALAN) 120 mg tablet in addition to separate prescription for 40 mg tabs  Take 120 mg BID with 40 mg mid day for 3 days, then if needed/tolerated 120 mg BID with 80 mg mid day for 3 days, then if needed 120 mg TID (Patient taking differently: 120 mg 3 (three) times a day) 60 tablet 5     No current facility-administered medications for this visit  No Known Allergies      Objective:     Exam limited by Video  Physical Exam:                                                                 Vitals:            Constitutional:    LMP 01/01/2015   BP Readings from Last 3 Encounters:   08/05/22 138/80   04/27/22 128/68   03/23/22 130/80     Pulse Readings from Last 3 Encounters:   08/05/22 102   04/27/22 96   09/27/21 92         Well developed, well nourished, No dysmorphic features  HEENT:  Normocephalic atraumatic  See neuro exam   Psychiatric:  Normal behavior and appropriate affect        Neurological Examination:     Mental status/cognitive function:   Recent and remote memory appear intact  Attention span and concentration as well as fund of knowledge appear appropriate for age  Normal language and spontaneous speech  Cranial Nerves:  VII-facial expression symmetric  Motor Exam: symmetric bulk throughout  no atrophy, fasciculations or abnormal movements noted     Coordination:  no apparent dysmetria, ataxia or tremor noted      Pertinent lab results:   See EMR for recent labs  06/21/2022 TSH and free T4 normal    CMP and CBC unremarkable except for slightly elevated alkaline phosphatase  01/28/2020 CMP significant for sodium 147, B12 260    10/29/2019 CMP and CBC unremarkable, TSH normal     06/08/2016 B12 283     Imaging:   MRI of the brain around 4 years ago normal per patient      MRA head 01/21/2015:  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   No evidence of intracranial aneurysm, AVM, cortical cerebral arterial stenosis or occlusion  - follow up CT scan was normal, no cyst      Review of Systems:   ROS obtained by medical assistant Personally reviewed and updated if indicated  I recommended PCP follow up for non neurologic problems  Review of Systems   Constitutional: Negative for appetite change and fever  HENT: Negative  Negative for hearing loss, tinnitus, trouble swallowing and voice change  Eyes: Negative  Negative for photophobia and pain  Respiratory: Negative  Negative for shortness of breath  Cardiovascular: Negative  Negative for palpitations  Gastrointestinal: Negative  Negative for nausea and vomiting  Endocrine: Negative  Negative for cold intolerance  Genitourinary: Negative  Negative for dysuria, frequency and urgency  Musculoskeletal: Negative  Negative for myalgias and neck pain  Skin: Negative  Negative for rash  Neurological: Negative  Negative for dizziness, tremors, seizures, syncope, facial asymmetry, speech difficulty, weakness, light-headedness, numbness and headaches  Hematological: Negative  Does not bruise/bleed easily  Psychiatric/Behavioral: Negative  Negative for confusion, hallucinations and sleep disturbance  All other systems reviewed and are negative        I have spent 9 minutes with Patient today in which greater than 50% of this time was spent in counseling/coordination of care  I also spent 14 minutes non face to face for this patient the same day

## 2022-09-02 ENCOUNTER — HOSPITAL ENCOUNTER (OUTPATIENT)
Dept: CT IMAGING | Facility: HOSPITAL | Age: 57
End: 2022-09-02
Payer: COMMERCIAL

## 2022-09-02 DIAGNOSIS — J98.4 RESTRICTIVE LUNG DISEASE: ICD-10-CM

## 2022-09-02 PROCEDURE — G1004 CDSM NDSC: HCPCS

## 2022-09-02 PROCEDURE — 71250 CT THORAX DX C-: CPT

## 2022-09-08 DIAGNOSIS — G44.011 INTRACTABLE EPISODIC CLUSTER HEADACHE: ICD-10-CM

## 2022-09-09 ENCOUNTER — TELEPHONE (OUTPATIENT)
Dept: NEUROLOGY | Facility: CLINIC | Age: 57
End: 2022-09-09

## 2022-09-09 RX ORDER — ELETRIPTAN HYDROBROMIDE 40 MG/1
TABLET, FILM COATED ORAL
Qty: 18 TABLET | Refills: 12 | Status: SHIPPED | OUTPATIENT
Start: 2022-09-09 | End: 2022-09-09

## 2022-09-09 RX ORDER — ELETRIPTAN HYDROBROMIDE 40 MG/1
TABLET, FILM COATED ORAL
Qty: 9 TABLET | Refills: 12 | Status: SHIPPED | OUTPATIENT
Start: 2022-09-09

## 2022-09-09 NOTE — TELEPHONE ENCOUNTER
Yes re verapamil, and this is not a high dose for cluster, but if she would like to  the emgality and hold it to see if it is helping that is ok since she has a large dose in her already  Now it sounds like the history we got about her taking a triptan every day is likely contributing to her headaches/rebound  Also I think once we get her sleep study that may provide some insights

## 2022-09-09 NOTE — TELEPHONE ENCOUNTER
The biggest area we could adjust is the verapamil, she is still not on a super high dose for cluster  At minimum I recommend she increase to 120 mg t i d  and I recommend she do this and if needed after tonight, could increase to 120 mg in am and 120 mg mid day and 240 mg at night and keep everything else the same

## 2022-09-09 NOTE — TELEPHONE ENCOUNTER
Pt left a vm today at [id number] and stated that her HA is much worse  Her cell phone not working  Can call her back at work at [phone] pt and states that her cluster HA has been bad the last 2 nights  It was bad last night around 8 pm  Felt pressure pain in her head w/ slight nausea    It went away for an hour but then it came back around 11 pm  She took relpax and HA and nausea went away        Current migraine medications are confirmed as:  Verapamil 120 mg  Currently she's taking 120 bid with 80 mg mid day  Amitriptyline 25 ng 2 tabs daily at bedtime  Relpax 40 mg prn as ordered  Emgality 300 mg monthly  Next due 9/27/22    States that she it has had worsening cluster HA for over 2 months  She is unsure if you want her to try alternative med or add additional med  Her cell phone is not working right now  She takes lunch from 12-1   Work till 5 pm today

## 2022-09-09 NOTE — TELEPHONE ENCOUNTER
Spoke w/ pt and states that she was given 18 tabs in July and last month  Since July, she has been taking relpax 1 tab daily  She takes it before her HA gets worse  She had few left over from previous script that is why she did not run out  Now, she is almost out of med  Educated the patient that we do not recommend she take Eletriptan  more than 3 days in any 1 week due to medication over use headache and CVA risk with overuse  She verbalized understanding  Her cell phone is not working right now  She takes lunch from 12-1   Work till 5 pm today

## 2022-09-09 NOTE — TELEPHONE ENCOUNTER
Called and advised of all of the below  States that she misspoke  She is taking verapamil   120 mg bid (0900-3 pm) and 200 mg at night  She increased the HS dose from 120 mg to 200 mg 3 days ago  She feels that she is taking too much meds but it does not seem to be helping  Do you still recommend 120 mg in am and 120 mg mid day and 240 mg at night and keep everything else the same?

## 2022-09-12 NOTE — TELEPHONE ENCOUNTER
Called and advised pt of all of the below  She verbalized clear understanding  Pt states that she has upcoming appt w/ you on 9/22/22 and her emgality is not due until 9/27/22  She prefers to discuss it w/ you at her appt about possibly holding this med

## 2022-09-14 ENCOUNTER — TELEPHONE (OUTPATIENT)
Dept: NEUROLOGY | Facility: CLINIC | Age: 57
End: 2022-09-14

## 2022-09-14 NOTE — TELEPHONE ENCOUNTER
Called patient and left voicemail to confirm their upcoming appointment with Dr Sara Jesus  Informed patient about arriving in the [place] location 15 minutes prior to appointment to get checked in and go over chart

## 2022-09-21 ENCOUNTER — OFFICE VISIT (OUTPATIENT)
Dept: PULMONOLOGY | Facility: CLINIC | Age: 57
End: 2022-09-21
Payer: COMMERCIAL

## 2022-09-21 VITALS
SYSTOLIC BLOOD PRESSURE: 120 MMHG | TEMPERATURE: 97.9 F | OXYGEN SATURATION: 95 % | WEIGHT: 293 LBS | DIASTOLIC BLOOD PRESSURE: 84 MMHG | HEART RATE: 80 BPM | BODY MASS INDEX: 41.02 KG/M2 | HEIGHT: 71 IN

## 2022-09-21 DIAGNOSIS — J98.4 RESTRICTIVE LUNG DISEASE: ICD-10-CM

## 2022-09-21 DIAGNOSIS — E66.9 OBESITY (BMI 30-39.9): ICD-10-CM

## 2022-09-21 DIAGNOSIS — R06.09 DYSPNEA ON EXERTION: ICD-10-CM

## 2022-09-21 DIAGNOSIS — G47.33 OSA (OBSTRUCTIVE SLEEP APNEA): ICD-10-CM

## 2022-09-21 DIAGNOSIS — J41.0 SIMPLE CHRONIC BRONCHITIS (HCC): Primary | ICD-10-CM

## 2022-09-21 DIAGNOSIS — R94.2 ABNORMAL PFTS: ICD-10-CM

## 2022-09-21 PROCEDURE — 99214 OFFICE O/P EST MOD 30 MIN: CPT | Performed by: INTERNAL MEDICINE

## 2022-09-21 RX ORDER — ALBUTEROL SULFATE 90 UG/1
2 AEROSOL, METERED RESPIRATORY (INHALATION) EVERY 6 HOURS PRN
Qty: 18 G | Refills: 1 | Status: SHIPPED | OUTPATIENT
Start: 2022-09-21 | End: 2022-10-03 | Stop reason: SDUPTHER

## 2022-09-21 NOTE — PROGRESS NOTES
Assessment/Plan:   Diagnoses and all orders for this visit:    Simple chronic bronchitis ([address] )  -     Discontinue: fluticasone-vilanterol (Breo Ellipta) 100-25 mcg/inh inhaler; Inhale 1 puff daily Rinse mouth after use  -     albuterol (Ventolin HFA) 90 mcg/act inhaler; Inhale 2 puffs every 6 (six) hours as needed for wheezing  -     fluticasone-vilanterol (Breo Ellipta) 100-25 mcg/inh inhaler; Inhale 1 puff daily Rinse mouth after use  Dyspnea on exertion    Abnormal PFTs    Restrictive lung disease    TOBIAS (obstructive sleep apnea)    Obesity (BMI 30-39  9)        PFTs with moderate restrictive airflow limitation FEV1 of 66 percent, the lung volumes are mildly decreased at 75 percent, the DLCO mildly decreased at 73 percent high-resolution CT of the chest does not demonstrate any evidence of interstitial lung disease   Mild upper lobe predominant centrilobular emphysema and a 4 millimeter left lower lobe lung nodule   Discussed with the patient regarding long-acting bronchodilators Breo 100/251 puff daily  Rinse mouth after use  Albuterol MDI 2 puffs 4 times daily as needed   Discussed although the PFT show restrictive limitation likely secondary from her obesity her FEV1 is 66 percent and the CT of the chest demonstrating emphysematous changes with her symptoms of dyspnea on exertion  High clinical suspicion for sleep disordered breathing is sleep study has been ordered which is pending due in January of 2023 she will follow-up after that  Recommend weight loss  Follow-up in 3-4 months or p r n  earlier as needed  Return in about 4 months (around 1/21/2023)  All questions are answered to the patient's satisfaction and understanding  She verbalizes understanding  She is encouraged to call with any further questions or concerns  Portions of the record may have been created with voice recognition software    Occasional wrong word or "sound a like" substitutions may have occurred due to the inherent limitations of voice recognition software  Read the chart carefully and recognize, using context, where substitutions have occurred  Electronically Signed by Gibran Gordon MD    ______________________________________________________________________    Chief Complaint:   Chief Complaint   Patient presents with    Follow-up       Patient ID: Ida Souza is a 59 y o  y o  female has a past medical history of Asymptomatic postmenopausal status, Back pain, lumbosacral, Cellulitis of left lower extremity, Cluster headaches, Colon polyps, CTS (carpal tunnel syndrome) (3 years ago), Dense breasts, Foot pain, left, Head injury, Hip pain, right, Lumbosacral pain, Lymph edema, Nosebleed, Right hip pain, Spider veins, Tendinitis of right shoulder, and Varicose veins of legs  9/21/2022  Patient presents today for follow-up visit  Patient is a very pleasant 67-year-old lady, with 44 pack-year smoking history who quit in 2020 she states she also contracted COVID-19 infection in February of 2021 since then her breathing has been better with increasing shortness of breath she states  She was not admitted in the hospital for COVID-19 infection she does not currently have any cough has shortness of breath and dyspnea on exertion especially when she climbs a flight of stairs   Also she has states has been following up with Neurology for cluster headaches and she also complains of loud snoring witnessed apneic spells occasional choking and gasping for air her daily sleep schedule is she goes to bed at around 10:00 p m  falls asleep right away and is out of bed at 6:00 a m  has 2-3 nocturnal awakenings for nocturia and when she is out of the bed she still tired and fatigued she states currently does not take a nap during the daytime  No symptoms related to restless legs      Review of Systems   Constitutional: Positive for fatigue  HENT: Negative  Eyes: Negative  Respiratory: Positive for cough and shortness of breath  Cardiovascular: Negative  Gastrointestinal: Negative  Endocrine: Negative  Genitourinary: Negative  Musculoskeletal: Negative  Allergic/Immunologic: Negative  Neurological: Negative  Hematological: Negative  Psychiatric/Behavioral: Negative  Smoking history: She reports that she quit smoking about 2 years ago  She started smoking about 46 years ago  She has a 44 00 pack-year smoking history  She has never used smokeless tobacco     The following portions of the patient's history were reviewed and updated as appropriate: allergies, current medications, past family history, past medical history, past social history, past surgical history and problem list     Immunization History   Administered Date(s) Administered    COVID-19 PFIZER VACCINE 0 3 ML IM 05/23/2021, 06/13/2021    Influenza, recombinant, quadrivalent,injectable, preservative free 10/23/2019, 11/06/2020    Tdap 03/30/2016     Current Outpatient Medications   Medication Sig Dispense Refill    albuterol (Ventolin HFA) 90 mcg/act inhaler Inhale 2 puffs every 6 (six) hours as needed for wheezing 18 g 1    amitriptyline (ELAVIL) 25 mg tablet Take 2 tablets (50 mg total) by mouth daily at bedtime 90 tablet 4    eletriptan (RELPAX) 40 MG tablet 1 tab po at migraine onset, may repeat in 2 hours if needed  Max 2/24 hours, 9 per month  9 tablet 12    fluticasone-vilanterol (Breo Ellipta) 100-25 mcg/inh inhaler Inhale 1 puff daily Rinse mouth after use  180 blister 1    Galcanezumab-gnlm (Emgality, 300 MG Dose,) 100 MG/ML SOSY Inject 3 mL (300 mg total) under the skin every 30 (thirty) days Until cluster cycle stops 3 mL 3    verapamil (CALAN) 120 mg tablet in addition to separate prescription for 40 mg tabs   Take 120 mg BID with 40 mg mid day for 3 days, then if needed/tolerated 120 mg BID with 80 mg mid day for 3 days, then if needed 120 mg TID (Patient taking differently: 120 mg 3 (three) times a day) 60 tablet 5    Naproxen Sodium 220 MG CAPS Take 2 capsules by mouth if needed (Patient not taking: No sig reported)       No current facility-administered medications for this visit  Allergies: Patient has no known allergies  Objective:  Vitals:    09/21/22 1440   BP: 120/84   Pulse: 80   Temp: 97 9 °F (36 6 °C)   SpO2: 95%   Weight: 133 kg (293 lb)   Height: 5' 11" (1 803 m)   Oxygen Therapy  SpO2: 95 %    Wt Readings from Last 3 Encounters:   09/21/22 133 kg (293 lb)   09/20/22 129 kg (285 lb)   08/05/22 128 kg (282 lb)     Body mass index is 40 87 kg/m²  Physical Exam  Constitutional:       Appearance: She is well-developed  HENT:      Head: Normocephalic and atraumatic  Eyes:      Pupils: Pupils are equal, round, and reactive to light  Neck:      Comments: Short and wide neck  Cardiovascular:      Rate and Rhythm: Normal rate and regular rhythm  Heart sounds: Normal heart sounds  Pulmonary:      Effort: Pulmonary effort is normal       Breath sounds: Normal breath sounds  Musculoskeletal:         General: Normal range of motion  Cervical back: Normal range of motion and neck supple  Skin:     General: Skin is warm and dry  Neurological:      Mental Status: She is alert and oriented to person, place, and time  Psychiatric:         Behavior: Behavior normal            Diagnostics:  I have personally reviewed pertinent films in PACS  CT chest high resolution    Result Date: 9/8/2022  Narrative: CT CHEST WITH HIGH RESOLUTION SLICES - WITHOUT CONTRAST INDICATION:   J98 4: Other disorders of lung  "ILD rule out, stopped smoking 3 years ago, history of Covid " Per my review of the medical record, the patient tested positive for Covid in February 2021  Shortness of breath and dyspnea on exertion since Covid with restrictive lung disease and decreased DLCO on PFTs  44 pack year smoking history, quit in January 2020  COMPARISON:  None   TECHNIQUE: CT of the chest without intravenous contrast  Contiguous 1 25 mm axial images and 1 x 10 mm transverse images in supine position  Thin section images at 10 mm intervals, supine and prone in inspiration and supine in expiration  Sagittal and  coronal reformats  Transverse and coronal MIP  Coronal minIP  Radiation dose length product (DLP):  579 mGy-cm   Radiation dose exposure minimized using iterative reconstruction and automated exposure control  FINDINGS: LUNGS:  Nothing to suggest interstitial lung disease with no reticulation, traction bronchiectasis/bronchiolectasis, groundglass, or honeycombing  No significant air trapping on expiration  Mild upper lobe predominant centrilobular emphysema  4 mm juxtapleural lateral basal left lower lobe nodule (3/146)  AIRWAYS: No significant filling defects  PLEURA:  Unremarkable  HEART/GREAT VESSELS:  Normal heart size  Mild coronary artery calcification indicating atherosclerotic heart disease  MEDIASTINUM AND ROULA:  Unremarkable  CHEST WALL AND LOWER NECK: Unremarkable  UPPER ABDOMEN:  Unremarkable  OSSEOUS STRUCTURES: Mild degenerative disease in the spine  Impression: Nothing to indicate interstitial lung disease  Mild emphysema  4 mm juxtapleural left lower lobe nodule  While this is likely a benign intrapulmonary lymph node, according to the 2017 Fleischner Society guidelines, follow-up with a chest CT with no contrast in one year is optional  Mild coronary artery calcification indicating atherosclerotic heart disease    Workstation performed: [id number]

## 2022-09-22 ENCOUNTER — TELEPHONE (OUTPATIENT)
Dept: OTHER | Facility: OTHER | Age: 57
End: 2022-09-22

## 2022-09-22 ENCOUNTER — OFFICE VISIT (OUTPATIENT)
Dept: NEUROLOGY | Facility: CLINIC | Age: 57
End: 2022-09-22
Payer: COMMERCIAL

## 2022-09-22 ENCOUNTER — TELEPHONE (OUTPATIENT)
Dept: NEUROLOGY | Facility: CLINIC | Age: 57
End: 2022-09-22

## 2022-09-22 VITALS
DIASTOLIC BLOOD PRESSURE: 76 MMHG | OXYGEN SATURATION: 98 % | SYSTOLIC BLOOD PRESSURE: 120 MMHG | HEART RATE: 78 BPM | HEIGHT: 71 IN | BODY MASS INDEX: 40.6 KG/M2 | RESPIRATION RATE: 16 BRPM | WEIGHT: 290 LBS

## 2022-09-22 DIAGNOSIS — G44.029 CHRONIC CLUSTER HEADACHE, NOT INTRACTABLE: Primary | ICD-10-CM

## 2022-09-22 DIAGNOSIS — J41.0 SIMPLE CHRONIC BRONCHITIS (HCC): ICD-10-CM

## 2022-09-22 PROCEDURE — 99215 OFFICE O/P EST HI 40 MIN: CPT | Performed by: PSYCHIATRY & NEUROLOGY

## 2022-09-22 NOTE — TELEPHONE ENCOUNTER
Pt called in stating her Rx for BREO is too expensive and would like something else called in  Please advise

## 2022-09-22 NOTE — PROGRESS NOTES
Patient ID: Bert Wells is a 59 y o  female  Assessment/Plan:    No problem-specific Assessment & Plan notes found for this encounter  {Assess/PlanSmartLinks:90841}       Subjective:    HPI    {[facility] HPI texts:82586}    {Common ambulatory SmartLinks:42415}         Objective:    Blood pressure 120/76, pulse 78, resp  rate 16, height 5' 11" (1 803 m), weight 132 kg (290 lb), last menstrual period 01/01/2015, SpO2 98 %, not currently breastfeeding  Physical Exam    Neurological Exam      ROS:    Review of Systems   Constitutional: Negative  Negative for appetite change and fever  HENT: Negative  Negative for hearing loss, tinnitus, trouble swallowing and voice change  Eyes: Negative  Negative for photophobia and pain  Respiratory: Negative  Negative for shortness of breath  Cardiovascular: Negative  Negative for palpitations  Gastrointestinal: Negative  Negative for nausea and vomiting  Endocrine: Negative  Negative for cold intolerance  Genitourinary: Negative  Negative for dysuria, frequency and urgency  Musculoskeletal: Negative  Negative for myalgias and neck pain  Skin: Negative  Negative for rash  Neurological: Negative  Negative for dizziness, tremors, seizures, syncope, facial asymmetry, speech difficulty, weakness, light-headedness, numbness and headaches  Hematological: Negative  Does not bruise/bleed easily  Psychiatric/Behavioral: Negative  Negative for confusion, hallucinations and sleep disturbance

## 2022-09-22 NOTE — PATIENT INSTRUCTIONS
agree with sleep study ordered for 1/20/23 and agree with wait list     Headache/migraine treatment:   Abortive medications (for immediate treatment of a headache): It is ok to take ibuprofen, acetaminophen or naproxen (Advil, Tylenol,  Aleve, Excedrin) if they help your headaches you should limit these to No more than 3 times a week to avoid medication overuse/rebound headaches  For your more moderate to severe migraines take this medication early   Eletriptan 40mg tabs - take one at the onset of headache  May repeat one time after 1-2 hours if pain has not resolved  Max 2 a day      Prescription preventive medications for headaches/migraines   (to take every day to help prevent headaches - not to take at the time of headache):  [x] amitriptyline continue 50 mg nightly for now with plan in a few months to consider slow wean off   emgality 300 mg monthly -  and hold for now unless cycle returns      verapamil - 160/120/240 - if you remain headache free continue 120/120/240   stay on lowest effective and tolerated dose  *Typically these types of medications take time untill you see the benefit, although some may see improvement in days, often it may take weeks, especially if the medication is being titrated up to a beneficial level  Please contact us if there are any concerns or questions regarding the medication  Lifestyle Recommendations:  [x] SLEEP - Maintain a regular sleep schedule: Adults need at least 7-8 hours of uninterrupted a night  Maintain good sleep hygiene:  Going to bed and waking up at consistent times, avoiding excessive daytime naps, avoiding caffeinated beverages in the evening, avoid excessive stimulation in the evening and generally using bed primarily for sleeping  One hour before bedtime would recommend turning lights down lower, decreasing your activity (may read quietly, listen to music at a low volume)   When you get into bed, should eliminate all technology (no texting, emailing, playing with your phone, iPad or tablet in bed)  [x] HYDRATION - Maintain good hydration  Drink  2L of fluid a day (4 typical small water bottles)  [x] DIET - Maintain good nutrition  In particular don't skip meals and try and eat healthy balanced meals regularly  [x] TRIGGERS - Look for other triggers and avoid them: Limit caffeine to 1-2 cups a day or less  Avoid dietary triggers that you have noticed bring on your headaches (this could include aged cheese, peanuts, MSG, aspartame and nitrates)  [x] EXERCISE - physical exercise as we all know is good for you in many ways, and not only is good for your heart, but also is beneficial for your mental health, cognitive health and  chronic pain/headaches  I would encourage at the least 5 days of physical exercise weekly for at least 30 minutes  Education and Follow-up  [x] Please call with any questions or concerns  Of course if any new concerning symptoms go to the emergency department    - Follow up 3 months, sooner if needed

## 2022-09-22 NOTE — TELEPHONE ENCOUNTER
Left message for Marlon Her to call back if she would like to change her 1pm appt with Malathi Haywood to a virtual  Her history shows she prefers virtual appt's if she wants to come in person that's fine as well

## 2022-09-22 NOTE — PROGRESS NOTES
[facility] Consult - Follow up   PATIENT:  Narciso Lares  MRN:  [id number]  :  1965  DATE OF SERVICE:  2022  REFERRED BY: No ref  provider found  PMD: Belinda Valdez MD    Assessment/Plan:   Emily Chavarria a delightful 57 y o  female with a past medical history that includes cluster headaches, chronic venous insufficiency, lymphedema, chronic back pain on and off, hip arthritis, obesity referred here for evaluation of headache  She has followed with my neurology colleagues in the past  My initial visit 2020     Chronic cluster headache, not intractable  Patient reports a long history of chronic headaches since her 35s    She reports her headaches, in clusters typically daily for 3 months at a time and occur in the evenings  Stephanie Hook can last anywhere from 15 minutes up to an hour and may happen 3 times at night and 2 times before bed  Stephanie Hook tend to build until they reach maximal intensity and then resolve   She reports may start with right teeth pain and then become right frontal and right retro-orbital stabbing pain although the most recent episode was mid frontal sharp electrical   Pain is severe   She reports associated features include photophobia, photophobia, right eye lacrimation, nasal congestion    - as of 2020: 3 months at a time will have daily headaches  - As of 2020: no headaches since last time, weaned herself off of lyrica and verapamil and just on amitriptyline 50 mg nightly and doing well  Computer read of recent EKG showed likely incorrect abnormalities since she was told it was normal, just want her PCP to look over it to make sure this is the case while on amitriptyline    - As of 2020:  No headaches since last visit, accidentally stop taking amitriptyline 50 mg nightly for 1 week and will restart, refilled today    We discussed plan for next time she has a cluster headache cycle - plan to start emgality - she will call in right went cycle starts  - as of 2/26/2021:  She has not had significant headache in the past year on amitriptyline 50 mg nightly  - as of 8/4/2022: after years of doing very well, cluster returned mid July  Started emgality 300 mg monthly for cluster headache 7/27/22 which helped a few days and may help more so continue  Decadron has not helped  Will add depakote and if needed also verapamil, both of which she has tolerated in the past for cluster  Now just weighing rapidly increasing meds vs possible side effects of BP dropping, she has cuff at home  Also recommended sleep study as TOBIAS may be contributing    - as of 8/18/2022: She reports no improvement and worsening when she tried depakote 1000 mg for 5 nights, verapamil has already helped (along with continuing emgality 300 mg monthly, amitriptyline 50 mg) and will continue titration up from 120 mg BID where she had 4 days no headaches, last 2 days 1 cluster headache each day that resolves with triptan  Titration gradually discussed if needed up to 120 mg TID with room to go higher if needed and no SE    - as of 9/1/2022:  She reports improvement since last visit on current medication combination with no cluster headaches the last 3 days  On amitriptyline 50 mg, emgality 300 mg monthly, verapamil 120 mg t i d  and we discussed room to gradually increase verapamil if headaches return as long as blood pressure tolerates   -as of 09/22/2022 she increased verapamil to 160/120/240 starting 09/12/2022 (and stopped the daily eletripan she had been taking once she realized this can make it worse) and has had significant improvement with last headache cluster 09/17/2022  We discussed I highly suspect sleep apnea triggering these typically nighttime cluster headaches, so may be hard to completely get out of the cycle until this is evaluated and treated    In the meantime will continue current dose of verapamil, but if she remains controlled at current dose can discontinue the extra 40 mg in a m  once she runs out in a few weeks  For now continue amitriptyline 50 mg nightly, but did not prevent this cluster and I tend to wean people off this at later years due to potential side effects so we will likely wean this off 1st after next visit and continue verapamil for prevention  For now continue emgality, but can  and hold next dose and see if headaches come back first before giving as it is unclear what has made her lymphedema worse  Workup:  - sleep study ordered by Pulm 8/5/22 and scheduled for 1/20/23  - Neurologic assessment reveals normal neurological exam, except for anisocoria with right pupil larger than left  -  MRI brain with without contrast 02/07/2020: No acute disease   Punctate pontine capillary telangiectasia    No orbital pathology      Preventative:  - we discussed headache hygiene and lifestyle factors that may improve headaches  -  amitriptyline 50 mg nightly  Discussed proper use, possible side effects and risks  - continue  emgality - SubQ: 300 mg at the onset of the cluster period and then once monthly until the end of the cluster period  -     verapamil (CALAN) gradual titration up to 120 mg TID and we discussed continued gradual titration instructions if needed, stay on lowest effective and tolerated dose  Discussed proper use, possible side effects and risks    - of note through other providers she is on daily meloxicam which is helped with her head chronic hip pain  - past/failed:  Carbamazepine, Depakote 500 mg and 1000 mg did not help in fact made headaches worse, topiramate, gabapentin, metoprolol, lyrica, verapamil, oxcarbazepine      Abortive:  - discussed not taking over-the-counter or prescription pain medications more than 3 days per week to prevent medication overuse/rebound headache  - Relpax/eletriptan helps  Discussed proper use, possible side effects and risks   -past:  Steroids helped in the past but lately has not helped, sumatriptan p o  Nasal helped for a little while and was too expensive, Depakote 500 mg and 1000 mg did not help in fact made headaches worse, rizatriptan she believes was helpful but too expensive - typically the pain starts too fast and ends before she is able to take something            Patient instructions      Audrey Dandy with sleep study ordered for 1/20/23 and agree with wait list     Headache/migraine treatment:   Abortive medications (for immediate treatment of a headache): It is ok to take ibuprofen, acetaminophen or naproxen (Advil, Tylenol,  Aleve, Excedrin) if they help your headaches you should limit these to No more than 3 times a week to avoid medication overuse/rebound headaches       For your more moderate to severe migraines take this medication early   Eletriptan 40mg tabs - take one at the onset of headache  May repeat one time after 1-2 hours if pain has not resolved  Max 2 a day      Prescription preventive medications for headaches/migraines   (to take every day to help prevent headaches - not to take at the time of headache):  [x]? ? amitriptyline continue 50 mg nightly for now with plan in a few months to consider slow wean off   emgality 300 mg monthly -  and hold for now unless cycle returns      verapamil - 160/120/240 - if you remain headache free continue 120/120/240   stay on lowest effective and tolerated dose      *Typically these types of medications take time untill you see the benefit, although some may see improvement in days, often it may take weeks, especially if the medication is being titrated up to a beneficial level  Please contact us if there are any concerns or questions regarding the medication          Lifestyle Recommendations:  [x]? ? SLEEP - Maintain a regular sleep schedule: Adults need at least 7-8 hours of uninterrupted a night   Maintain good sleep hygiene:  Going to bed and waking up at consistent times, avoiding excessive daytime naps, avoiding caffeinated beverages in the evening, avoid excessive stimulation in the evening and generally using bed primarily for sleeping   One hour before bedtime would recommend turning lights down lower, decreasing your activity (may read quietly, listen to music at a low volume)  When you get into bed, should eliminate all technology (no texting, emailing, playing with your phone, iPad or tablet in bed)  [x]?? HYDRATION - Maintain good hydration   Drink  2L of fluid a day (4 typical small water bottles)  [x]? ? DIET - Maintain good nutrition  In particular don't skip meals and try and eat healthy balanced meals regularly  [x]? ? TRIGGERS - Look for other triggers and avoid them: Limit caffeine to 1-2 cups a day or less  Avoid dietary triggers that you have noticed bring on your headaches (this could include aged cheese, peanuts, MSG, aspartame and nitrates)  [x]? ? EXERCISE - physical exercise as we all know is good for you in many ways, and not only is good for your heart, but also is beneficial for your mental health, cognitive health and  chronic pain/headaches  I would encourage at the least 5 days of physical exercise weekly for at least 30 minutes       Education and Follow-up  [x]? ? Please call with any questions or concerns  Of course if any new concerning symptoms go to the emergency department  -?? Follow up 3 months, sooner if needed        CC: We had the pleasure of evaluating Keesha DURAN Walker in neurological consultation today  Aguilaraysha Chavarria a   right handed female who presents today for evaluation of headaches       History obtained from patient as well as available medical record review    History of Present Illness:   Interval history as of 9/22/2022  - denies any new or concerning neurologic symptoms since last visit    - max 3 hours in a row, wakes every hour to look at clock     Headaches and migraines   1 week no headache, then 9/17/22 was the last headache - multiple     Preventative: verapamil 160/120/240 started 9/12/22 -no issues with blood pressure, blood pressure is perfect at this dose, denies constipation  amitriptyline 50 mg nightly  emgality 300 mg monthly      Abortive: relpax - was taking daily for a while and stopped 9/12/22  Denies bothersome side effects     Interval history as of 9/1/2022  - denies any new or concerning neurologic symptoms since last visit     Headaches and migraines   - when she got off the computer with me two weeks ago got a headache, one day without, then slight headache, then last 3 nights has not had any  - she called 8/29/22 because her lymphedema was worse     Preventative:   amitriptyline 50 mg nightly  emgality 300 mg monthly    verapamil - 160/120/120    Abortive: eletriptan  Denies bothersome side effects    Interval history as of 8/18/2022  - denies any new or concerning neurologic symptoms since last visit   - sleep study ordered by Pulm 8/5/22 and scheduled for 1/20/23  - able to work through this, doesn't need work note     Headaches and migraines   stuck in her cluster headache cycle, but seems to be having some improvement    While taking the depakote on 1000 mg headaches were worse after 5 nights  Then started verapamil and then on day 5 and 6 took 120 mg BID and no headaches for 4 days, then 8/16/22 and 8/17/22 1 headache each day that improved with relpax     Preventative:    - next emgality 300 mg dose should be by 8/27/22  - trial of depakote 1-2 tabs nightly - made worse  - amitriptyline 50 mg nightly   - trial of re adding verapamil up to 120 mg BID helps     Blood pressure not dropping, no SE    Abortive:   - relpax /eletriptan   Denies bothersome side effects     Interval history as of 8/4/2022  - she was to follow up in a year and is now following up 1 5 years later due to return of headaches  - retiring at the end of the year and nervous about it     Headaches and migraines    - ran out of amitriptyline mid May due to pharmacy issue and missed one week and started to feel like she may have headaches again 5/18/22 but then didn't get them  - 7/24/22 msg reporting headaches for the past week, I received the message from staff 2 days later and called her and we added emgality 300 mg monthly for cluster  - 8/2/22 called reporting headaches continue after improving for 2-3 days, decadron added and on 3rd day   - still having headaches daily now, about 2 times a day, lasting about 20 minutes, not as bad as previous years, still crying at the end and trying not to since it makes it feel worse, pressure helps on the region  - right temporal and jaw and behind the eye and less so in the teeth, no vision changes    Preventative:   - emgality 300 mg monthly 7/27/22 - belly no issues   - amitriptyline 50 mg nightly - ran out mid May due to pharmacy issue and missed one week and started to have headaches again 5/18/22    Abortive:   - eletriptan/relpax  - hydrocodone for back sometimes - not taking   Denies bothersome side effects     -----------------   - seeing pulmonary tomorrow, up every 2 hours, snores   How likely a you to doze off or fall sleep during the following situations?   0 - would never doze  1 - slight chance of dozing  2 - moderate chance of dosing  3 - high chance of dozing    [place] sleepiness scale - 11  Sitting and reading - 2  Watching TV - 3  Sitting, inactive in a public place such as a theater 0  As a passenger in a car for an hour without a break -0   Lying down to rest in afternoon when circumstances permit - 3  Sitting and talking to someone - 0  Sitting quietly after lunch without alcohol 3  In a car while stopped for few minutes in traffic - 0      Interval history as of 02/26/2021   - dx COVID last weds   - has not smoked in a year     She reports she has not had a significant headache or migraine in over a year   (she had these headaches about 10 years and thinks menopaused ended a few years ago, but we discussed they may have been hormonally related and maybe she is done with them now)  Preventative: Amitriptyline 50 mg nightly  Abortive:  Eletriptan     Interval history as of 9/11/2020:  - just had hip surgery in 8/2020  - forgot to take amitriptyline 50 mg for past week - will restart  - no headaches since we last talked         Interval history as of 5/29/2020:   -  MRI brain with without contrast 02/07/2020: No acute disease   Punctate pontine capillary telangiectasia    No orbital pathology      Headaches -none      She called in 03/10/2020 wanted to decrease Lyrica on verapamil, Amitriptyline-attempted to wean off but patient feel better on 25 mg nightly      Lyrica - weaned herself off  Verapamil - weaned herself off  Amitriptyline - 50 mg and doing great, sleeping wonderful 10-8      relpax not needed     Headaches started at what age? Late 24s years old  How often do the headaches occur?   - as of 1/24/2020: 3 months at a time headaches daily, none in 6 weeks   - As of 05/29/2020: no headaches since last time, weaned herself off of lyrica and verapamil and just on amitriptyline 50 mg nightly and doing well   What time of the day do the headaches start? only at night,  most of the time during sleep or before she sleeps at night  How long do the headaches last? 15 mins to an hour - 3 times at night and 2 times before bed - worsens until it gets to a point where it blows up and then goes away   Are you ever headache free? Yes     Aura? without aura     Where is your headache located and pain quality?   - starts with right teeth pain and feels it coming   - usually right frontal and right retroorbital - stabbing  - midfrontal lately - sharp, electtical  What is the intensity of pain? Average: 15/10  Associated symptoms:   []?? Nausea       []? ? Vomiting        []? ?[place]  [x]? ? Insomnia    []? ? Stiff or sore neck   [x]? ? Problems with concentration  [x]? ? Photophobia     [x]? ?Phonophobia      []? ? Osmophobia  []? ? Blurred vision   [x]? ? Prefer quiet, dark room  []? ? Light-headed or dizzy     []? ? Tinnitus    []? ? Hands or feet tingle or feel numb/paresthesias       []? ? Red ear      []? ? Ptosis      []? ? Facial droop  [x]? ? Lacrimation - right tearing  [x]? ? Nasal congestion  []? ? Flushing of face  []? ? Change in pupil size     Number of days missed per month because of headaches:  Work (or school) days: 0     Things that make the headache worse? No specific movements, any movement      Headache triggers:  Unknown   What time of the year do headaches occur more frequently?   are usually worse winter but has also had in July     Have you seen someone else for headaches or pain? Yes, Dr Raymon Gudino  Have you had trigger point injection performed and how often? No  Have you had Botox injection performed and how often? No   Have you had epidural injections or transforaminal injections performed? No  Are you current pregnant or planning on getting pregnant? No   Have you ever had any Brain imaging? Yes multiple normal      What medications do you take or have you taken for your headaches?    ABORTIVE:    OTC medications have been ineffective         Relpax - 12/month - if takes before bed able to sleep for 4 hours -      Has norco for back once a year when back goes out      Past:  Steroids helped in the past, but last couple bathes lasted a day only  Sumatriptan PO and nasal helped for a little   Rizatriptan - was helpful and too expensive      PREVENTIVE:      - amitriptyline 25 mg - been on for years - up to 50 in cycle   For hip pain: meloxicam      Past:  - Verapamil 240 mg daily (in past 240 +120)   - pregabalin 75 mg daily- started 1 week before being 6 weeks headache free        Past/failed:   carbamazepine - did not help  depakote did not help   topiramate 100 mg over 3 months   Gabapentin helped in the past and not recently  Metoprolol        LIFESTYLE  Sleep   - averages: normally sleeps well     Physical activity: none      Water: 0 per day  Caffeine: 8 cups per day  Diet:  overeating     Mood:   Denies history of anxiety or depression or other diagnosed mood disorder     The following portions of the patient's history were reviewed and updated as appropriate: allergies, current medications, past family history, past medical history, past social history, past surgical history and problem list      Pertinent family history:  Family history of headaches:  Migraines in daughter, sister and mother  Any family history of aneurysms - No     Pertinent social history:  Elicia Yanez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  [address]  Lives with      Illicit Drugs: denies  Alcohol/tobacco: quiting tobacco - 5 cigs improvement, alcohol no     Past Medical History:     Past Medical History:   Diagnosis Date    Asymptomatic postmenopausal status     Back pain, lumbosacral     Cellulitis of left lower extremity     last assessed    3/30/16    resolved  [place]   9/8/16      Cluster headaches     Colon polyps     CTS (carpal tunnel syndrome) 3 years ago    Dense breasts     Foot pain, left     Head injury     Dec 2017 no LOC    Hip pain, right     Lumbosacral pain     Lymph edema     Nosebleed     Right hip pain     Spider veins     Tendinitis of right shoulder     Varicose veins of legs        Patient Active Problem List   Diagnosis    Thrombophlebitis of superficial veins of right lower extremity    Chronic venous insufficiency    Lymphedema    Back pain, lumbosacral    Dense breasts    Varicose veins of legs    Cluster headaches    Asymptomatic postmenopausal status    Abnormal resting ECG findings    Carpal tunnel syndrome    Chronic peripheral venous hypertension    Degenerative joint disease of right hip    Radial styloid tenosynovitis    Other shoulder lesions, right shoulder    Biceps tendinitis    Osteoarthritis of acromioclavicular joint    Cellulitis of lower extremity    Spider angioma    Status post hip surgery    Bleeding from varicose veins of lower extremity    Dyspnea on exertion       Medications:      Current Outpatient Medications   Medication Sig Dispense Refill    amitriptyline (ELAVIL) 25 mg tablet Take 2 tablets (50 mg total) by mouth daily at bedtime 90 tablet 4    eletriptan (RELPAX) 40 MG tablet 1 tab po at migraine onset, may repeat in 2 hours if needed  Max 2/24 hours, 9 per month  9 tablet 12    Galcanezumab-gnlm (Emgality, 300 MG Dose,) 100 MG/ML SOSY Inject 3 mL (300 mg total) under the skin every 30 (thirty) days Until cluster cycle stops 3 mL 3    verapamil (CALAN) 120 mg tablet in addition to separate prescription for 40 mg tabs  Take 120 mg BID with 40 mg mid day for 3 days, then if needed/tolerated 120 mg BID with 80 mg mid day for 3 days, then if needed 120 mg TID (Patient taking differently: 120 mg 3 (three) times a day) 60 tablet 5    albuterol (Ventolin HFA) 90 mcg/act inhaler Inhale 2 puffs every 6 (six) hours as needed for wheezing (Patient not taking: Reported on 9/22/2022) 18 g 1    fluticasone-vilanterol (Breo Ellipta) 100-25 mcg/inh inhaler Inhale 1 puff daily Rinse mouth after use  (Patient not taking: Reported on 9/22/2022) 180 blister 1    Naproxen Sodium 220 MG CAPS Take 2 capsules by mouth if needed (Patient not taking: Reported on 9/22/2022)       No current facility-administered medications for this visit          Allergies:    No Known Allergies    Family History:     Family History   Problem Relation Age of Onset   Citlali Wagner Sudden death Mother         MVA hit by a drunk     Varicose Veins Father     Heart disease Father         cardiac disorder    Breast cancer Sister         neoplasm    Other Maternal Grandmother         gyn problem    Cervical cancer Maternal Grandmother     Breast cancer Maternal Aunt         neoplasm    Migraines Daughter     Cancer Sister         Appendix    Colon cancer Neg Hx     Ovarian cancer Neg Hx     Uterine cancer Neg Hx Social History:     Social History     Socioeconomic History    Marital status: /Civil Union     Spouse name: Not on file    Number of children: 2    Years of education: Not on file    Highest education level: Not on file   Occupational History     Comment: full time - post office   Tobacco Use    Smoking status: Former Smoker     Packs/day: 1 00     Years: 44 00     Pack years: 44 00     Start date:      Quit date:      Years since quittin 7    Smokeless tobacco: Never Used    Tobacco comment: started smoking at 13years old   Vaping Use    Vaping Use: Never used   Substance and Sexual Activity    Alcohol use: Not Currently     Comment: rare    Drug use: No    Sexual activity: Yes     Partners: Male     Birth control/protection: Female Sterilization   Other Topics Concern    Not on file   Social History Narrative    Brushes teeth twice a day    Dental care regularly        Two children    Works for the post office     Social [employer]     Financial Resource Strain: Not on file   Food Insecurity: Not on file   Transportation Needs: Not on file   Physical Activity: Not on file   Stress: Not on file   Social Connections: Not on file   Intimate Partner Violence: Not on file   Housing Stability: Not on file         Objective:     Physical Exam:                                                                 Vitals:            Constitutional:    /76 (BP Location: Right arm, Patient Position: Sitting, Cuff Size: Standard)   Pulse 78   Resp 16   Ht 5' 11" (1 803 m)   Wt 132 kg (290 lb)   LMP 2015   SpO2 98%   Breastfeeding No   BMI 40 45 kg/m²   BP Readings from Last 3 Encounters:   22 120/76   22 120/84   22 114/70     Pulse Readings from Last 3 Encounters:   22 78   22 80   22 83         Well developed, well nourished, well groomed  No dysmorphic features         Psychiatric:  Normal behavior and appropriate affect        Neurological Examination:     Mental status/cognitive function:   Recent and remote memory intact  Attention span and concentration as well as fund of knowledge are appropriate for age  Normal language and spontaneous speech  Cranial Nerves:  VII-facial expression symmetric  VIII-hearing grossly intact bilaterally   Motor Exam: symmetric bulk throughout  no atrophy, fasciculations or abnormal movements noted  Coordination:  no apparent dysmetria, ataxia or tremor noted  Gait: steady casual antalgic gait        Pertinent lab results:   See EMR for recent labs  06/21/2022 TSH and free T4 normal    CMP and CBC unremarkable except for slightly elevated alkaline phosphatase  01/28/2020 CMP significant for sodium 147, B12 260    10/29/2019 CMP and CBC unremarkable, TSH normal     06/08/2016 B12 283     Imaging:   MRI of the brain around 4 years ago normal per patient      MRA head 01/21/2015:  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   No evidence of intracranial aneurysm, AVM, cortical cerebral arterial stenosis or occlusion  - follow up CT scan was normal, no cyst   Review of Systems:   ROS obtained by medical assistant Personally reviewed and updated if indicated  I recommended PCP follow up for non neurologic problems  Review of Systems   Constitutional: Negative  Negative for appetite change and fever  HENT: Negative  Negative for hearing loss, tinnitus, trouble swallowing and voice change  Eyes: Negative  Negative for photophobia and pain  Respiratory: Negative  Negative for shortness of breath  Cardiovascular: Negative  Negative for palpitations  Gastrointestinal: Negative  Negative for nausea and vomiting  Endocrine: Negative  Negative for cold intolerance  Genitourinary: Negative  Negative for dysuria, frequency and urgency  Musculoskeletal: Negative  Negative for myalgias and neck pain  Skin: Negative  Negative for rash  Neurological: Negative  Negative for dizziness, tremors, seizures, syncope, facial asymmetry, speech difficulty, weakness, light-headedness, numbness and headaches  Hematological: Negative  Does not bruise/bleed easily  Psychiatric/Behavioral: Negative  Negative for confusion, hallucinations and sleep disturbance  I have spent 33 minutes with Patient  today in which greater than 50% of this time was spent in counseling/coordination of care regarding Prognosis, Risks and benefits of tx options, Intructions for management, Patient education, Importance of tx compliance, Risk factor reductions and Impressions  I also spent 10 minutes non face to face for this patient the same day         Author:  Muna Martinez MD 9/22/2022 2:24 PM

## 2022-09-23 NOTE — TELEPHONE ENCOUNTER
Lm for her, She needs to call her ins and find out which inhalers are preferred or tier 1 by her ins

## 2022-09-23 NOTE — TELEPHONE ENCOUNTER
Called pt and advised she should call her ins and ask what the preferred list and call me and advise

## 2022-10-03 RX ORDER — ALBUTEROL SULFATE 90 UG/1
2 AEROSOL, METERED RESPIRATORY (INHALATION) EVERY 6 HOURS PRN
Qty: 18 G | Refills: 1 | Status: SHIPPED | OUTPATIENT
Start: 2022-10-03

## 2022-10-03 NOTE — TELEPHONE ENCOUNTER
S/w pt, would like for us to send in inhalers to [facility] for one month and she nathaly advise

## 2022-10-24 ENCOUNTER — TELEPHONE (OUTPATIENT)
Dept: INTERNAL MEDICINE CLINIC | Facility: CLINIC | Age: 57
End: 2022-10-24

## 2022-10-24 NOTE — TELEPHONE ENCOUNTER
Pt left a voicemail about having a rib injury , and wanted to use an urgent care that had xrays possibly? Any ideas?

## 2022-10-25 NOTE — TELEPHONE ENCOUNTER
Called patient and advised on voicemail as best as I could    advised to call the office if she still needs any assistance

## 2022-11-02 DIAGNOSIS — G44.029 CHRONIC CLUSTER HEADACHE, NOT INTRACTABLE: ICD-10-CM

## 2022-11-02 DIAGNOSIS — G44.011 INTRACTABLE EPISODIC CLUSTER HEADACHE: ICD-10-CM

## 2022-11-02 RX ORDER — ELETRIPTAN HYDROBROMIDE 40 MG/1
TABLET, FILM COATED ORAL
Qty: 18 TABLET | Refills: 6 | Status: SHIPPED | OUTPATIENT
Start: 2022-11-02

## 2022-11-02 RX ORDER — VERAPAMIL HYDROCHLORIDE 120 MG/1
120 TABLET, FILM COATED ORAL 3 TIMES DAILY
Qty: 90 TABLET | Refills: 5 | Status: SHIPPED | OUTPATIENT
Start: 2022-11-02

## 2022-11-02 NOTE — TELEPHONE ENCOUNTER
Pt left a VM requesting 2 med orders be changed  1  Verapamil quantity to 90 pills as she is taking 3/day (Currently at 60, but script allows her to take 3/day if tolerated), and 2  pt would like quantity of Relpax to be 18/2 months as it cost $ 20 for 9 every month and costs the same for 18 every 2 months, so it saves her $20  Please signoff if agreeable

## 2022-12-08 DIAGNOSIS — G44.029 CHRONIC CLUSTER HEADACHE, NOT INTRACTABLE: ICD-10-CM

## 2022-12-08 RX ORDER — VERAPAMIL HYDROCHLORIDE 120 MG/1
120 TABLET, FILM COATED ORAL 3 TIMES DAILY
Qty: 270 TABLET | Refills: 2 | Status: SHIPPED | OUTPATIENT
Start: 2022-12-08

## 2022-12-14 ENCOUNTER — OFFICE VISIT (OUTPATIENT)
Dept: INTERNAL MEDICINE CLINIC | Facility: CLINIC | Age: 57
End: 2022-12-14

## 2022-12-14 VITALS
SYSTOLIC BLOOD PRESSURE: 120 MMHG | DIASTOLIC BLOOD PRESSURE: 68 MMHG | RESPIRATION RATE: 16 BRPM | HEIGHT: 71 IN | OXYGEN SATURATION: 99 % | BODY MASS INDEX: 40.63 KG/M2 | HEART RATE: 84 BPM | TEMPERATURE: 97.4 F | WEIGHT: 290.2 LBS

## 2022-12-14 DIAGNOSIS — R05.1 ACUTE COUGH: ICD-10-CM

## 2022-12-14 DIAGNOSIS — J20.9 ACUTE BRONCHITIS WITH BRONCHOSPASM: Primary | ICD-10-CM

## 2022-12-14 RX ORDER — BENZONATATE 100 MG/1
100 CAPSULE ORAL 3 TIMES DAILY PRN
Qty: 30 CAPSULE | Refills: 0 | Status: SHIPPED | OUTPATIENT
Start: 2022-12-14

## 2022-12-14 RX ORDER — METHYLPREDNISOLONE 4 MG/1
TABLET ORAL
Qty: 21 TABLET | Refills: 0 | Status: SHIPPED | OUTPATIENT
Start: 2022-12-14

## 2022-12-14 RX ORDER — AZITHROMYCIN 250 MG/1
TABLET, FILM COATED ORAL
Qty: 6 TABLET | Refills: 0 | Status: SHIPPED | OUTPATIENT
Start: 2022-12-14 | End: 2022-12-18

## 2022-12-14 NOTE — PROGRESS NOTES
Assessment/Plan:   Patient Instructions   Start Z-Guillermo and finish  Always wise to take a probiotic and or eat yogurt daily when on an antibiotic  Start and finish Medrol pack  Keep me informed as to your progress or not  Start cough Perles as needed 3 times a day for coughing  Quality Measures:   Depression Screening and Follow-up Plan: Patient was screened for depression during today's encounter  They screened negative with a PHQ-2 score of 0  Return for Next scheduled follow up  Diagnoses and all orders for this visit:    Acute bronchitis with bronchospasm  -     azithromycin (ZITHROMAX) 250 mg tablet; Take 2 tablets today then 1 tablet daily x 4 days  -     methylPREDNISolone 4 MG tablet therapy pack; Use as directed on package    Acute cough  -     benzonatate (TESSALON PERLES) 100 mg capsule; Take 1 capsule (100 mg total) by mouth 3 (three) times a day as needed for cough        Subjective:      Patient ID: Michel Stewart is a 59 y o  female  Acute visit    Patient states she thinks she had a flulike illness over the Thanksgiving holiday  Since then she has developed head congestion, cough productive of greenish sputum, nasal drip, ear pressure in her right ear  OTC DayQuil/NyQuil not helping  They do help her to sleep at night  Low-grade fever        ALLERGIES:  No Known Allergies    CURRENT MEDICATIONS:    Current Outpatient Medications:   •  albuterol (Ventolin HFA) 90 mcg/act inhaler, Inhale 2 puffs every 6 (six) hours as needed for wheezing, Disp: 18 g, Rfl: 1  •  amitriptyline (ELAVIL) 25 mg tablet, Take 2 tablets (50 mg total) by mouth daily at bedtime, Disp: 90 tablet, Rfl: 4  •  azithromycin (ZITHROMAX) 250 mg tablet, Take 2 tablets today then 1 tablet daily x 4 days, Disp: 6 tablet, Rfl: 0  •  benzonatate (TESSALON PERLES) 100 mg capsule, Take 1 capsule (100 mg total) by mouth 3 (three) times a day as needed for cough, Disp: 30 capsule, Rfl: 0  •  eletriptan (RELPAX) 40 MG tablet, 1 tab po at migraine onset, may repeat in 2 hours if needed  Max 2/24 hours, 18 per 2 month , Disp: 18 tablet, Rfl: 6  •  fluticasone-vilanterol (Breo Ellipta) 100-25 mcg/inh inhaler, Inhale 1 puff daily Rinse mouth after use , Disp: 60 blister, Rfl: 3  •  Galcanezumab-gnlm (Emgality, 300 MG Dose,) 100 MG/ML SOSY, Inject 3 mL (300 mg total) under the skin every 30 (thirty) days Until cluster cycle stops, Disp: 3 mL, Rfl: 3  •  methylPREDNISolone 4 MG tablet therapy pack, Use as directed on package, Disp: 21 tablet, Rfl: 0  •  verapamil (CALAN) 120 mg tablet, TAKE 1 TABLET (120 MG TOTAL) BY MOUTH 3 (THREE) TIMES A DAY, Disp: 270 tablet, Rfl: 2  •  Naproxen Sodium 220 MG CAPS, Take 2 capsules by mouth if needed (Patient not taking: Reported on 12/14/2022), Disp: , Rfl:     ACTIVE PROBLEM LIST:  Patient Active Problem List   Diagnosis   • Thrombophlebitis of superficial veins of right lower extremity   • Chronic venous insufficiency   • Lymphedema   • Back pain, lumbosacral   • Dense breasts   • Varicose veins of legs   • Cluster headaches   • Asymptomatic postmenopausal status   • Abnormal resting ECG findings   • Carpal tunnel syndrome   • Chronic peripheral venous hypertension   • Degenerative joint disease of right hip   • Radial styloid tenosynovitis   • Other shoulder lesions, right shoulder   • Biceps tendinitis   • Osteoarthritis of acromioclavicular joint   • Cellulitis of lower extremity   • Spider angioma   • Status post hip surgery   • Bleeding from varicose veins of lower extremity   • Dyspnea on exertion       PAST MEDICAL HISTORY:  Past Medical History:   Diagnosis Date   • Asymptomatic postmenopausal status    • Back pain, lumbosacral    • Cellulitis of left lower extremity     last assessed    3/30/16    resolved  Chucho Rashaad   9/8/16     • Cluster headaches    • Colon polyps    • CTS (carpal tunnel syndrome) 3 years ago   • Dense breasts    • Foot pain, left    • Head injury     Dec 2017 no LOC   • Hip pain, right    • Lumbosacral pain    • Lymph edema    • Nosebleed    • Right hip pain    • Spider veins    • Tendinitis of right shoulder    • Varicose veins of legs        PAST SURGICAL HISTORY:  Past Surgical History:   Procedure Laterality Date   • BACK SURGERY      type unknown- occurred during vaginal delivery   • BREAST BIOPSY Left    • HAND TENDON SURGERY     • KS REVISE MEDIAN N/CARPAL TUNNEL SURG Right 2016    Procedure: WRIST OPEN CARPAL TUNNEL RELEASE ;  Surgeon: Eloisa Gallegos MD;  Location: [facility];  Service: Orthopedics   • SINUS SURGERY     • TOTAL HIP ARTHROPLASTY Right 2020   • TUBAL LIGATION     • VASCULAR SURGERY  various dates   • VEIN LIGATION AND STRIPPING Left        FAMILY HISTORY:  Family History   Problem Relation Age of Onset   • Sudden death Mother         MVA hit by a drunk    • Varicose Veins Father    • Heart disease Father         cardiac disorder   • Breast cancer Sister         neoplasm   • Other Maternal Grandmother         gyn problem   • Cervical cancer Maternal Grandmother    • Breast cancer Maternal Aunt         neoplasm   • Migraines Daughter    • Cancer Sister         Appendix   • Colon cancer Neg Hx    • Ovarian cancer Neg Hx    • Uterine cancer Neg Hx        SOCIAL HISTORY:  Social History     Socioeconomic History   • Marital status: /Civil Union     Spouse name: Not on file   • Number of children: 2   • Years of education: Not on file   • Highest education level: Not on file   Occupational History     Comment: full time - [employer]   Tobacco Use   • Smoking status: Former     Packs/day: 1 00     Years: 44 00     Pack years: 44 00     Types: Cigarettes     Start date:      Quit date:      Years since quittin 9   • Smokeless tobacco: Never   • Tobacco comments:     started smoking at 13years old   Vaping Use   • Vaping Use: Never used   Substance and Sexual Activity   • Alcohol use: Not Currently     Comment: rare   • Drug use: No   • Sexual activity: Yes     Partners: Male     Birth control/protection: Female Sterilization   Other Topics Concern   • Not on file   Social History Narrative    Brushes teeth twice a day    Dental care regularly        Two children    Works for the post office     Social Determinants of Health     Financial Resource Strain: Not on file   Food Insecurity: Not on file   Transportation Needs: Not on file   Physical Activity: Not on file   Stress: Not on file   Social Connections: Not on file   Intimate Partner Violence: Not on file   Housing Stability: Not on file       Review of Systems   Constitutional: Positive for fatigue  Negative for activity change, chills and fever  HENT: Positive for congestion, ear pain, postnasal drip and rhinorrhea  Negative for sinus pressure, sinus pain, sneezing, sore throat and voice change  Eyes: Negative for discharge, redness and itching  Respiratory: Positive for cough, chest tightness and wheezing  Negative for shortness of breath  Cardiovascular: Negative for chest pain, palpitations and leg swelling  Gastrointestinal: Negative for abdominal pain  Genitourinary: Negative for difficulty urinating  Musculoskeletal: Negative for arthralgias and myalgias  Skin: Negative for rash  Allergic/Immunologic: Negative for immunocompromised state  Neurological: Negative for dizziness, syncope, weakness, light-headedness and headaches  Hematological: Negative for adenopathy  Does not bruise/bleed easily  Psychiatric/Behavioral: Negative for dysphoric mood, sleep disturbance and suicidal ideas  The patient is not nervous/anxious  Objective:  Vitals:    12/14/22 1431   BP: 120/68   BP Location: Left arm   Patient Position: Sitting   Cuff Size: Adult   Pulse: 84   Resp: 16   Temp: (!) 97 4 °F (36 3 °C)   TempSrc: Tympanic   SpO2: 99%   Weight: 132 kg (290 lb 3 2 oz)   Height: 5' 11" (1 803 m)     Body mass index is 40 47 kg/m²       Physical Exam  Vitals and nursing note reviewed  Constitutional:       General: She is not in acute distress  Appearance: She is well-developed  She is not ill-appearing  HENT:      Head: Normocephalic and atraumatic  Right Ear: Ear canal and external ear normal       Left Ear: Tympanic membrane, ear canal and external ear normal       Ears:      Comments: Tympanic membrane is retracted with fluid behind     Nose:      Comments: Hyperemic nasal mucosa  Eyes:      Extraocular Movements: Extraocular movements intact  Pupils: Pupils are equal, round, and reactive to light  Neck:      Thyroid: No thyromegaly  Vascular: No carotid bruit or JVD  Cardiovascular:      Rate and Rhythm: Normal rate and regular rhythm  Heart sounds: Normal heart sounds  Pulmonary:      Effort: Pulmonary effort is normal  No respiratory distress  Breath sounds: No rales  Comments: Scattered late inspiratory wheezes  Forced expiration causes coughing with scattered wheezing present  Musculoskeletal:      Cervical back: Neck supple  Right lower leg: Edema (Chronic lymphedema) present  Left lower leg: Edema (Chronic lymphedema) present  Lymphadenopathy:      Cervical: No cervical adenopathy  Skin:     General: Skin is warm and dry  Findings: No rash  Neurological:      General: No focal deficit present  Mental Status: She is alert and oriented to person, place, and time  Mental status is at baseline  Psychiatric:         Mood and Affect: Mood normal          Behavior: Behavior normal            RESULTS:    No results found for this or any previous visit (from the past 1008 hour(s))  This note was created with voice recognition software  Phonic, grammatical and spelling errors may be present within the note as a result

## 2022-12-14 NOTE — PATIENT INSTRUCTIONS
Start Z-Guillermo and finish  Always wise to take a probiotic and or eat yogurt daily when on an antibiotic  Start and finish Medrol pack  Keep me informed as to your progress or not  Start cough Perles as needed 3 times a day for coughing

## 2023-01-20 ENCOUNTER — TELEPHONE (OUTPATIENT)
Dept: NEUROLOGY | Facility: CLINIC | Age: 58
End: 2023-01-20

## 2023-01-25 ENCOUNTER — OFFICE VISIT (OUTPATIENT)
Dept: INTERNAL MEDICINE CLINIC | Facility: CLINIC | Age: 58
End: 2023-01-25

## 2023-01-25 VITALS
SYSTOLIC BLOOD PRESSURE: 120 MMHG | DIASTOLIC BLOOD PRESSURE: 76 MMHG | OXYGEN SATURATION: 98 % | BODY MASS INDEX: 40.47 KG/M2 | RESPIRATION RATE: 16 BRPM | HEART RATE: 89 BPM | HEIGHT: 71 IN

## 2023-01-25 DIAGNOSIS — I83.93 VARICOSE VEINS OF BOTH LOWER EXTREMITIES, UNSPECIFIED WHETHER COMPLICATED: Primary | ICD-10-CM

## 2023-01-25 DIAGNOSIS — I89.0 LYMPHEDEMA: ICD-10-CM

## 2023-01-25 DIAGNOSIS — I87.2 CHRONIC VENOUS INSUFFICIENCY: ICD-10-CM

## 2023-01-25 DIAGNOSIS — Z23 NEED FOR INFLUENZA VACCINATION: ICD-10-CM

## 2023-01-25 PROBLEM — L03.119 CELLULITIS OF LOWER EXTREMITY: Status: RESOLVED | Noted: 2022-03-23 | Resolved: 2023-01-25

## 2023-01-25 RX ORDER — IBUPROFEN 800 MG/1
TABLET ORAL
COMMUNITY
Start: 2022-10-24

## 2023-01-25 NOTE — PATIENT INSTRUCTIONS
Lightly wrapped left leg in Ace bandage from ankle to knee when you are on your feet for a prolonged period  Use ibuprofen 800 mg, 3 times a day with food for 5 to 7 days if any of the veins become dilated and tender  Also applied topical heat to those areas  Moist heat as best   Follow-up with vascular as you have scheduled in mid February

## 2023-01-25 NOTE — PROGRESS NOTES
Assessment/Plan:   Patient Instructions   Lightly wrapped left leg in Ace bandage from ankle to knee when you are on your feet for a prolonged period  Use ibuprofen 800 mg, 3 times a day with food for 5 to 7 days if any of the veins become dilated and tender  Also applied topical heat to those areas  Moist heat as best   Follow-up with vascular as you have scheduled in mid February  Quality Measures: BMI Counseling: Body mass index is 40 47 kg/m²  The BMI is above normal  Nutrition recommendations include decreasing portion sizes, encouraging healthy choices of fruits and vegetables, consuming healthier snacks, moderation in carbohydrate intake and reducing intake of cholesterol  Exercise recommendations include exercising 3-5 times per week  Rationale for BMI follow-up plan is due to patient being overweight or obese  Depression Screening and Follow-up Plan: Patient was screened for depression during today's encounter  They screened negative with a PHQ-2 score of 0  Return for Next scheduled follow up  Diagnoses and all orders for this visit:    Varicose veins of both lower extremities, unspecified whether complicated    Chronic venous insufficiency    Lymphedema    Need for influenza vaccination  -     influenza vaccine, quadrivalent, recombinant, PF, 0 5 mL, for patients 18 yr+ (FLUBLOK)    Other orders  -     ibuprofen (MOTRIN) 800 mg tablet; TAKE 1 TABLET BY MOUTH EVERY 6 HOURS AS NEEDED FOR MILD PAIN (PAIN SCORE 1-3)  Subjective:      Patient ID: Vikki Richardson is a 59 y o  female  Acute visit    Patient with known chronic venous insufficiency and multiple varicosities of her lower legs with lymphedema presents with complaint of pain of one of the dilated veins in her right lower leg  She is concerned for a "clot"  She does state that over the past several days it has been improving    She does generally follow with vascular, vascular has injected several of the veins but despite this she states she seems to develop new ones  No shortness of breath  No fever or chills  ALLERGIES:  No Known Allergies    CURRENT MEDICATIONS:    Current Outpatient Medications:   •  albuterol (Ventolin HFA) 90 mcg/act inhaler, Inhale 2 puffs every 6 (six) hours as needed for wheezing, Disp: 18 g, Rfl: 1  •  amitriptyline (ELAVIL) 25 mg tablet, Take 2 tablets (50 mg total) by mouth daily at bedtime, Disp: 90 tablet, Rfl: 4  •  eletriptan (RELPAX) 40 MG tablet, 1 tab po at migraine onset, may repeat in 2 hours if needed  Max 2/24 hours, 18 per 2 month , Disp: 18 tablet, Rfl: 6  •  Galcanezumab-gnlm (Emgality, 300 MG Dose,) 100 MG/ML SOSY, Inject 3 mL (300 mg total) under the skin every 30 (thirty) days Until cluster cycle stops, Disp: 3 mL, Rfl: 3  •  ibuprofen (MOTRIN) 800 mg tablet, TAKE 1 TABLET BY MOUTH EVERY 6 HOURS AS NEEDED FOR MILD PAIN (PAIN SCORE 1-3)  , Disp: , Rfl:   •  Naproxen Sodium 220 MG CAPS, Take 2 capsules by mouth if needed, Disp: , Rfl:   •  verapamil (CALAN) 120 mg tablet, TAKE 1 TABLET (120 MG TOTAL) BY MOUTH 3 (THREE) TIMES A DAY, Disp: 270 tablet, Rfl: 2    ACTIVE PROBLEM LIST:  Patient Active Problem List   Diagnosis   • Thrombophlebitis of superficial veins of right lower extremity   • Chronic venous insufficiency   • Lymphedema   • Back pain, lumbosacral   • Dense breasts   • Varicose veins of legs   • Cluster headaches   • Asymptomatic postmenopausal status   • Abnormal resting ECG findings   • Carpal tunnel syndrome   • Chronic peripheral venous hypertension   • Degenerative joint disease of right hip   • Radial styloid tenosynovitis   • Other shoulder lesions, right shoulder   • Biceps tendinitis   • Osteoarthritis of acromioclavicular joint   • Spider angioma   • Status post hip surgery   • Bleeding from varicose veins of lower extremity   • Dyspnea on exertion       PAST MEDICAL HISTORY:  Past Medical History:   Diagnosis Date   • Asymptomatic postmenopausal status    • Back pain, lumbosacral    • Cellulitis of left lower extremity     last assessed    3/30/16    resolved  Rosy Johnson   9/8/16     • Cellulitis of lower extremity 3/23/2022   • Cluster headaches    • Colon polyps    • CTS (carpal tunnel syndrome) 3 years ago   • Dense breasts    • Foot pain, left    • Head injury     Dec 2017 no LOC   • Hip pain, right    • Lumbosacral pain    • Lymph edema    • Nosebleed    • Right hip pain    • Spider veins    • Tendinitis of right shoulder    • Varicose veins of legs        PAST SURGICAL HISTORY:  Past Surgical History:   Procedure Laterality Date   • BACK SURGERY      type unknown- occurred during vaginal delivery   • BREAST BIOPSY Left    • HAND TENDON SURGERY     • SD NEUROPLASTY &/TRANSPOS MEDIAN NRV CARPAL TUNNE Right 09/12/2016    Procedure: WRIST OPEN CARPAL TUNNEL RELEASE ;  Surgeon: Alphonso Thomas MD;  Location[facility];  Service: Orthopedics   • SINUS SURGERY     • TOTAL HIP ARTHROPLASTY Right 08/24/2020   • TUBAL LIGATION     • VASCULAR SURGERY  various dates   • VEIN LIGATION AND STRIPPING Left        FAMILY HISTORY:  Family History   Problem Relation Age of Onset   • Sudden death Mother         MVA hit by a drunk    • Varicose Veins Father    • Heart disease Father         cardiac disorder   • Breast cancer Sister         neoplasm   • Other Maternal Grandmother         gyn problem   • Cervical cancer Maternal Grandmother    • Breast cancer Maternal Aunt         neoplasm   • Migraines Daughter    • Cancer Sister         Appendix   • Colon cancer Neg Hx    • Ovarian cancer Neg Hx    • Uterine cancer Neg Hx        SOCIAL HISTORY:  Social History     Socioeconomic History   • Marital status: /Civil Union     Spouse name: Not on file   • Number of children: 2   • Years of education: Not on file   • Highest education level: Not on file   Occupational History     Comment: full time - post [employer]   Tobacco Use   • Smoking status: Former     Packs/day: 1 00 Years: 44 00     Pack years: 44 00     Types: Cigarettes     Start date: 10     Quit date: 2020     Years since quitting: 3 0   • Smokeless tobacco: Never   • Tobacco comments:     started smoking at 13years old   Vaping Use   • Vaping Use: Never used   Substance and Sexual Activity   • Alcohol use: Not Currently     Comment: rare   • Drug use: No   • Sexual activity: Yes     Partners: Male     Birth control/protection: Female Sterilization   Other Topics Concern   • Not on file   Social History Narrative    Brushes teeth twice a day    Dental care regularly        Two children    Works for the post office     Social Determinants of Health     Financial Resource Strain: Not on file   Food Insecurity: Not on file   Transportation Needs: Not on file   Physical Activity: Not on file   Stress: Not on file   Social Connections: Not on file   Intimate Partner Violence: Not on file   Housing Stability: Not on file       Review of Systems   Constitutional: Negative for activity change, chills, fatigue and fever  HENT: Negative for congestion  Eyes: Negative for discharge  Respiratory: Negative for cough, chest tightness and shortness of breath  Cardiovascular: Negative for chest pain, palpitations and leg swelling  Gastrointestinal: Negative for abdominal pain  Genitourinary: Negative for difficulty urinating  Musculoskeletal: Negative for arthralgias and myalgias  Skin: Negative for rash  Allergic/Immunologic: Negative for immunocompromised state  Neurological: Negative for dizziness, syncope, weakness, light-headedness and headaches  Hematological: Negative for adenopathy  Does not bruise/bleed easily  Psychiatric/Behavioral: Negative for dysphoric mood  The patient is not nervous/anxious            Objective:  Vitals:    01/25/23 1325   BP: 120/76   BP Location: Left arm   Patient Position: Sitting   Cuff Size: Adult   Pulse: 89   Resp: 16   SpO2: 98%   Height: 5' 11" (1 803 m) Body mass index is 40 47 kg/m²  Physical Exam  Vitals and nursing note reviewed  Constitutional:       General: She is not in acute distress  Appearance: She is well-developed  She is obese  She is not ill-appearing  HENT:      Head: Normocephalic and atraumatic  Eyes:      Extraocular Movements: Extraocular movements intact  Pupils: Pupils are equal, round, and reactive to light  Pulmonary:      Effort: Pulmonary effort is normal  No respiratory distress  Musculoskeletal:      Right lower leg: No edema  Left lower leg: No edema  Comments: Multiple varicosities of bilateral lower legs, several are firm, 1 located at mid right calf area is slightly discolored and tender  No neurovascular compromise  Skin:     General: Skin is warm and dry  Findings: No rash  Neurological:      General: No focal deficit present  Mental Status: She is alert and oriented to person, place, and time  Mental status is at baseline  Psychiatric:         Mood and Affect: Mood normal          Behavior: Behavior normal            RESULTS:    No results found for this or any previous visit (from the past 1008 hour(s))  This note was created with voice recognition software  Phonic, grammatical and spelling errors may be present within the note as a result

## 2023-01-27 ENCOUNTER — OFFICE VISIT (OUTPATIENT)
Dept: NEUROLOGY | Facility: CLINIC | Age: 58
End: 2023-01-27

## 2023-01-27 VITALS
DIASTOLIC BLOOD PRESSURE: 78 MMHG | SYSTOLIC BLOOD PRESSURE: 124 MMHG | WEIGHT: 283 LBS | HEIGHT: 71 IN | BODY MASS INDEX: 39.62 KG/M2 | HEART RATE: 86 BPM

## 2023-01-27 DIAGNOSIS — G44.029 CHRONIC CLUSTER HEADACHE, NOT INTRACTABLE: Primary | ICD-10-CM

## 2023-01-27 RX ORDER — AMITRIPTYLINE HYDROCHLORIDE 10 MG/1
TABLET, FILM COATED ORAL
Qty: 50 TABLET | Refills: 0 | Status: SHIPPED | OUTPATIENT
Start: 2023-01-27

## 2023-01-27 NOTE — PROGRESS NOTES
Patient ID: Rosario Joe is a 59 y o  female  Assessment/Plan:    No problem-specific Assessment & Plan notes found for this encounter  {Assess/PlanSmartLinks:63184}       Subjective:    HPI    {[facility] HPI texts:43821}    {Common ambulatory SmartLinks:06376}         Objective:    Last menstrual period 01/01/2015, not currently breastfeeding  Physical Exam    Neurological Exam      ROS:    Review of Systems   Constitutional: Negative  Negative for appetite change and fever  HENT: Negative  Negative for hearing loss, tinnitus, trouble swallowing and voice change  Eyes: Negative  Negative for photophobia, pain and visual disturbance  Respiratory: Negative  Negative for shortness of breath  Cardiovascular: Negative  Negative for palpitations  Gastrointestinal: Negative  Negative for nausea and vomiting  Endocrine: Negative  Negative for cold intolerance  Genitourinary: Negative  Negative for dysuria, frequency and urgency  Musculoskeletal: Negative  Negative for gait problem, myalgias and neck pain  Skin: Negative  Negative for rash  Allergic/Immunologic: Negative  Neurological: Negative  Negative for dizziness, tremors, seizures, syncope, facial asymmetry, speech difficulty, weakness, light-headedness, numbness and headaches  Hematological: Negative  Does not bruise/bleed easily  Psychiatric/Behavioral: Negative  Negative for confusion, hallucinations and sleep disturbance

## 2023-01-27 NOTE — PATIENT INSTRUCTIONS
agree with sleep study ordered and agree with wait list     Headache/migraine treatment:   Abortive medications (for immediate treatment of a headache): It is ok to take ibuprofen, acetaminophen or naproxen (Advil, Tylenol,  Aleve, Excedrin) if they help your headaches you should limit these to No more than 3 times a week to avoid medication overuse/rebound headaches  For your more moderate to severe migraines take this medication early   Eletriptan 40mg tabs - take one at the onset of headache  May repeat one time after 1-2 hours if pain has not resolved  Max 2 a day      Prescription preventive medications for headaches/migraines   (to take every day to help prevent headaches - not to take at the time of headache):  [x] amitriptyline - Decrease to 20 mg nightly for  2 week, then 10 mg for 2 weeks and then stop    verapamil - changing to 120 ER let me know if too pricey and I will change back       *Typically these types of medications take time untill you see the benefit, although some may see improvement in days, often it may take weeks, especially if the medication is being titrated up to a beneficial level  Please contact us if there are any concerns or questions regarding the medication  Lifestyle Recommendations:  [x] SLEEP - Maintain a regular sleep schedule: Adults need at least 7-8 hours of uninterrupted a night  Maintain good sleep hygiene:  Going to bed and waking up at consistent times, avoiding excessive daytime naps, avoiding caffeinated beverages in the evening, avoid excessive stimulation in the evening and generally using bed primarily for sleeping  One hour before bedtime would recommend turning lights down lower, decreasing your activity (may read quietly, listen to music at a low volume)  When you get into bed, should eliminate all technology (no texting, emailing, playing with your phone, iPad or tablet in bed)  [x] HYDRATION - Maintain good hydration    Drink  2L of fluid a day (4 typical small water bottles)  [x] DIET - Maintain good nutrition  In particular don't skip meals and try and eat healthy balanced meals regularly  [x] TRIGGERS - Look for other triggers and avoid them: Limit caffeine to 1-2 cups a day or less  Avoid dietary triggers that you have noticed bring on your headaches (this could include aged cheese, peanuts, MSG, aspartame and nitrates)  [x] EXERCISE - physical exercise as we all know is good for you in many ways, and not only is good for your heart, but also is beneficial for your mental health, cognitive health and  chronic pain/headaches  I would encourage at the least 5 days of physical exercise weekly for at least 30 minutes  Education and Follow-up  [x] Please call with any questions or concerns  Of course if any new concerning symptoms go to the emergency department    - Follow up 6 months and if doing well at 6 months and only on verapamil then ok to push back to 1 year, sooner if needed

## 2023-02-09 DIAGNOSIS — J41.0 SIMPLE CHRONIC BRONCHITIS (HCC): Primary | ICD-10-CM

## 2023-02-10 DIAGNOSIS — J41.0 SIMPLE CHRONIC BRONCHITIS (HCC): ICD-10-CM

## 2023-02-10 RX ORDER — FLUTICASONE FUROATE AND VILANTEROL 100; 25 UG/1; UG/1
1 POWDER RESPIRATORY (INHALATION) DAILY
Qty: 60 BLISTER | Refills: 5 | Status: SHIPPED | OUTPATIENT
Start: 2023-02-10 | End: 2023-03-12

## 2023-02-10 NOTE — TELEPHONE ENCOUNTER
Can we have patient call her insurance and see what is on her formulary equivalent to the [facility]? Thanks

## 2023-02-22 NOTE — TELEPHONE ENCOUNTER
Called pt, pricessed PA for generic as brand name [facility] is covered but will cost 94       PA Pending: Key: [id number]

## 2023-02-23 NOTE — TELEPHONE ENCOUNTER
Called [facility], generic for breo was denied  Brand name Vida Barthel is covered but as per pt too expensive  Insurance stated they will also cover Symbicort brand name or Dulera brand name with prior auth needed for both   Please advise

## 2023-02-24 RX ORDER — FLUTICASONE FUROATE AND VILANTEROL TRIFENATATE 100; 25 UG/1; UG/1
POWDER RESPIRATORY (INHALATION)
Qty: 60 EACH | Refills: 5 | Status: SHIPPED | OUTPATIENT
Start: 2023-02-24

## 2023-03-29 ENCOUNTER — ANNUAL EXAM (OUTPATIENT)
Dept: OBGYN CLINIC | Facility: CLINIC | Age: 58
End: 2023-03-29

## 2023-03-29 VITALS
BODY MASS INDEX: 40.74 KG/M2 | WEIGHT: 291 LBS | SYSTOLIC BLOOD PRESSURE: 116 MMHG | DIASTOLIC BLOOD PRESSURE: 82 MMHG | HEIGHT: 71 IN

## 2023-03-29 DIAGNOSIS — Z12.31 SCREENING MAMMOGRAM FOR BREAST CANCER: ICD-10-CM

## 2023-03-29 DIAGNOSIS — Z01.419 ENCOUNTER FOR GYNECOLOGICAL EXAMINATION WITHOUT ABNORMAL FINDING: Primary | ICD-10-CM

## 2023-03-29 DIAGNOSIS — Z78.0 ASYMPTOMATIC POSTMENOPAUSAL STATUS: ICD-10-CM

## 2023-03-29 PROBLEM — M75.20 BICEPS TENDINITIS: Status: RESOLVED | Noted: 2022-03-23 | Resolved: 2023-03-29

## 2023-03-29 PROBLEM — R92.2 DENSE BREASTS: Status: RESOLVED | Noted: 2017-02-15 | Resolved: 2023-03-29

## 2023-03-29 PROBLEM — M75.81 OTHER SHOULDER LESIONS, RIGHT SHOULDER: Status: RESOLVED | Noted: 2022-03-23 | Resolved: 2023-03-29

## 2023-03-29 PROBLEM — R92.30 DENSE BREASTS: Status: RESOLVED | Noted: 2017-02-15 | Resolved: 2023-03-29

## 2023-03-29 PROBLEM — M25.561 ARTHRALGIA OF RIGHT KNEE: Status: ACTIVE | Noted: 2023-03-29

## 2023-03-29 NOTE — PROGRESS NOTES
Diagnoses and all orders for this visit:    Encounter for gynecological examination without abnormal finding  -     Liquid-based pap, screening    Asymptomatic postmenopausal status    Screening mammogram for breast cancer  -     Mammo screening bilateral w 3d & cad; Future    Call as needed, encouraged calcium/vit D in her diet, call with any PMB, all questions answered  Pleasant 59 y o  postmenopausal female here for annual exam  She denies postmenopausal bleeding  Denies history of abnormal pap smears, last Paps NIL in 2021 and 2022, HPV neg in 2020, REQUESTS YEARLY PAPS, pap done  today  Denies vaginal issues  Denies pelvic pain  Denies postmenopausal issues  Sexually active without any issues  Colonoscopy due again in 12/2023 by Dr Noa Newby  Stopped smoking 2020, applauded her for not going back  Past Medical History:   Diagnosis Date   • Asymptomatic postmenopausal status    • Back pain, lumbosacral    • Biceps tendinitis 3/23/2022   • Cellulitis of left lower extremity     last assessed    3/30/16    resolved  Annita Kouts   9/8/16     • Cellulitis of lower extremity 03/23/2022   • Cluster headaches    • Colon polyps    • CTS (carpal tunnel syndrome) 3 years ago   • Dense breasts    • Foot pain, left    • Head injury     Dec 2017 no LOC   • Hip pain, right    • Lumbosacral pain    • Lymph edema    • Migraine 6 weeks   cluster headaches   • Nosebleed    • Right hip pain    • Spider veins    • Tendinitis of right shoulder    • Varicose veins of legs      Past Surgical History:   Procedure Laterality Date   • BACK SURGERY      type unknown- occurred during vaginal delivery   • BREAST BIOPSY Left    • HAND TENDON SURGERY     • NH NEUROPLASTY &/TRANSPOS MEDIAN NRV CARPAL TUNNE Right 09/12/2016    Procedure: WRIST OPEN CARPAL TUNNEL RELEASE ;  Surgeon: Radha Peacock MD;  Location: [facility];  Service: Orthopedics   • SINUS SURGERY     • TOTAL HIP ARTHROPLASTY Right 08/24/2020   • TUBAL LIGATION     • VASCULAR SURGERY  various dates   • VEIN LIGATION AND STRIPPING Left      Family History   Problem Relation Age of Onset   • Sudden death Mother         MVA hit by a drunk    • Varicose Veins Father    • Heart disease Father         cardiac disorder   • Cancer Sister         appendex   • Cancer Sister         Appendix   • Cancer Sister         Breast   • Other Maternal Grandmother         gyn problem   • Cervical cancer Maternal Grandmother    • Migraines Daughter    • Breast cancer Maternal Aunt         neoplasm   • Colon cancer Neg Hx    • Ovarian cancer Neg Hx    • Uterine cancer Neg Hx      Social History     Tobacco Use   • Smoking status: Former     Packs/day: 1 00     Years: 30 00     Pack years: 30 00     Types: Cigarettes     Start date: 10     Quit date: 2/15/2020     Years since quitting: 3 1   • Smokeless tobacco: Never   • Tobacco comments:     started smoking at 13years old   Vaping Use   • Vaping Use: Never used   Substance Use Topics   • Alcohol use: Not Currently     Comment: rare   • Drug use: No       Current Outpatient Medications:   •  albuterol (Ventolin HFA) 90 mcg/act inhaler, Inhale 2 puffs every 6 (six) hours as needed for wheezing, Disp: 18 g, Rfl: 1  •  Breo Ellipta 100-25 MCG/ACT inhaler, INHALE 1 PUFF DAILY RINSE MOUTH AFTER USE , Disp: 60 each, Rfl: 5  •  eletriptan (RELPAX) 40 MG tablet, 1 tab po at migraine onset, may repeat in 2 hours if needed  Max 2/24 hours, 18 per 2 month , Disp: 18 tablet, Rfl: 6  •  Galcanezumab-gnlm (Emgality, 300 MG Dose,) 100 MG/ML SOSY, Inject 3 mL (300 mg total) under the skin every 30 (thirty) days Until cluster cycle stops, Disp: 3 mL, Rfl: 3  •  ibuprofen (MOTRIN) 800 mg tablet, TAKE 1 TABLET BY MOUTH EVERY 6 HOURS AS NEEDED FOR MILD PAIN (PAIN SCORE 1-3)  , Disp: , Rfl:   •  verapamil (CALAN-SR) 120 mg CR tablet, Take 1 tablet (120 mg total) by mouth daily at bedtime, Disp: 90 tablet, Rfl: 2  Patient Active Problem List    Diagnosis Date Noted   • "Arthralgia of right knee 2023   • Dyspnea on exertion    • Carpal tunnel syndrome 2022   • Chronic peripheral venous hypertension 2022   • Spider angioma 2022   • Bleeding from varicose veins of lower extremity 2022   • Osteoarthritis of acromioclavicular joint 06/10/2021   • Degenerative joint disease of right hip 2020   • Status post hip surgery 2020   • Radial styloid tenosynovitis 2020   • Abnormal resting ECG findings 04/15/2019   • Asymptomatic postmenopausal status 2019   • Cluster headaches 2018   • Varicose veins of legs 2018   • Thrombophlebitis of superficial veins of right lower extremity 2018   • Chronic venous insufficiency 2018   • Lymphedema 2018   • Back pain, lumbosacral 2016       No Known Allergies    OB History    Para Term  AB Living   2 2 2     2   SAB IAB Ectopic Multiple Live Births           2      # Outcome Date GA Lbr Samuel/2nd Weight Sex Delivery Anes PTL Lv   2 Term            1 Term              Worked in [email or web address] part time- officially retired now!  2 grown son and daughter, Living back at home, she may give them the house and go live in [place], no grands yet    Vitals:    23 1309   BP: 116/82   BP Location: Left arm   Patient Position: Sitting   Cuff Size: Standard   Weight: 132 kg (291 lb)   Height: 5' 11\" (1 803 m)     Body mass index is 40 59 kg/m²  Review of Systems   Constitutional: Negative for chills, fatigue, fever and unexpected weight change  Respiratory: Negative for shortness of breath  Gastrointestinal: Negative for anal bleeding, blood in stool and diarrhea  +Occasional constipation  Genitourinary: Negative for difficulty urinating, dysuria and hematuria  Physical Exam   Constitutional: She appears well-developed and well-nourished  No distress  HENT: atraumatic, EOMI  Head: Normocephalic  Neck: Normal range of motion  Neck supple   " Pulmonary: Effort normal   Breasts: bilateral without masses, skin changes or nipple discharge  Bilaterally soft and warm to touch  No areas of erythema or pain  Abdominal: Soft  Pelvic exam was performed with patient supine  No labial fusion  There is no rash, tenderness or injury on the right labia  Right labial inclusion cysts noted  There is no rash, tenderness, lesion or injury on the left labia  Urethral meatus does not show any tenderness, inflammation or discharge  Palpation of midline bladder without pain or discomfort  Uterus is not deviated, not enlarged, not fixed and not tender  Cervix exhibits no motion tenderness, no discharge and no friability  Right adnexum displays no mass, no tenderness and no fullness  Left adnexum displays no mass, no tenderness and no fullness  No erythema or tenderness in the vagina  No foreign body in the vagina  No signs of injury around the vagina or anus  Perineum without lesions, signs of injury, erythema or swelling  No vaginal discharge found  Lymphadenopathy:        Right: No inguinal adenopathy present  Left: No inguinal adenopathy present

## 2023-03-29 NOTE — PATIENT INSTRUCTIONS
Breast Self Exam for Women   AMBULATORY CARE:   A breast self-exam (BSE)  is a way to check your breasts for lumps and other changes  Regular BSEs can help you know how your breasts normally look and feel  Most breast lumps or changes are not cancer, but you should always have them checked by a healthcare provider  Why you should do a BSE:  Breast cancer is the most common type of cancer in women  Even if you have mammograms, you may still want to do a BSE regularly  If you know how your breasts normally feel and look, it may help you know when to contact your healthcare provider  Mammograms can miss some cancers  You may find a lump during a BSE that did not show up on a mammogram   When you should do a BSE:  If you have periods, you may want to do your BSE 1 week after your period ends  This is the time when your breasts may be the least swollen, lumpy, or tender  You can do regular BSEs even if you are breastfeeding or have breast implants  Call your doctor if:   You find any lumps or changes in your breasts  You have breast pain or fluid coming from your nipples  You have questions or concerns about your condition or care  How to do a BSE:       Look at your breasts in a mirror  Look at the size and shape of each breast and nipple  Check for swelling, lumps, dimpling, scaly skin, or other skin changes  Look for nipple changes, such as a nipple that is painful or beginning to pull inward  Gently squeeze both nipples and check to see if fluid (that is not breast milk) comes out of them  If you find any of these or other breast changes, contact your healthcare provider  Check your breasts while you sit or  the following 3 positions:    [place] your arms down at your sides  Raise your hands and join them behind your head  Put firm pressure with your hands on your hips  Bend slightly forward while you look at your breasts in the mirror  Lie down and feel your breasts    When you lie down, your breast tissue spreads out evenly over your chest  This makes it easier for you to feel for lumps and anything that may not be normal for your breasts  Do a BSE on one breast at a time  Place a small pillow or towel under your left shoulder  Put your left arm behind your head  Use the 3 middle fingers of your right hand  Use your fingertip pads, on the top of your fingers  Your fingertip pad is the most sensitive part of your finger  Use small circles to feel your breast tissue  Use your fingertip pads to make dime-sized, overlapping circles on your breast and armpits  Use light, medium, and firm pressure  First, press lightly  Second, press with medium pressure to feel a little deeper into the breast  Last, use firm pressure to feel deep within your breast     Examine your entire breast area  Examine the breast area from above the breast to below the breast where you feel only ribs  Make small circles with your fingertips, starting in the middle of your armpit  Make circles going up and down the breast area  Continue toward your breast and all the way across it  Examine the area from your armpit all the way over to the middle of your chest (breastbone)  Stop at the middle of your chest     Move the pillow or towel to your right shoulder, and put your right arm behind your head  Use the 3 fingertip pads of your left hand, and repeat the above steps to do a BSE on your right breast   What else you can do to check for breast problems or cancer:  Talk to your healthcare provider about mammograms  A mammogram is an x-ray of your breasts to screen for breast cancer or other problems  Your provider can tell you the benefits and risks of mammograms  The first mammogram is usually at age 39 or 48  Your provider may recommend you start at 36 or younger if your risk for breast cancer is high  Mammograms usually continue every 1 to 2 years until age 76         Follow up with your doctor as directed:  Write down your questions so you remember to ask them during your visits  © Copyright [employer] 2022 Information is for End User's use only and may not be sold, redistributed or otherwise used for commercial purposes  The above information is an  only  It is not intended as medical advice for individual conditions or treatments  Talk to your doctor, nurse or pharmacist before following any medical regimen to see if it is safe and effective for you

## 2023-04-05 LAB
LAB AP GYN PRIMARY INTERPRETATION: NORMAL
Lab: NORMAL

## 2023-04-06 ENCOUNTER — TELEPHONE (OUTPATIENT)
Dept: ADMINISTRATIVE | Facility: OTHER | Age: 58
End: 2023-04-06

## 2023-04-06 NOTE — TELEPHONE ENCOUNTER
----- Message from Donna Francisco [place] sent at 4/6/2023  7:50 AM EDT -----  Regarding: mammo  04/06/23 7:50 AM    Hello, our patient attached above has had Mammogram completed/performed  Please assist in updating the patient chart by pulling the Care Everywhere (CE) document  The date of service is 4/5/23       Thank you,  Donna Francisco  [facility] Notice

## 2023-04-07 NOTE — TELEPHONE ENCOUNTER
Upon review of the In Basket request we were able to locate, review, and update the patient chart as requested for Mammogram     Any additional questions or concerns should be emailed to the Practice Liaisons via the appropriate education email address, please do not reply via In Basket      Thank you  Ivonne Torres

## 2023-05-16 ENCOUNTER — OFFICE VISIT (OUTPATIENT)
Dept: LAB | Facility: HOSPITAL | Age: 58
End: 2023-05-16

## 2023-05-16 DIAGNOSIS — Z01.818 PREOP EXAMINATION: ICD-10-CM

## 2023-05-17 ENCOUNTER — OFFICE VISIT (OUTPATIENT)
Dept: INTERNAL MEDICINE CLINIC | Facility: CLINIC | Age: 58
End: 2023-05-17

## 2023-05-17 VITALS
DIASTOLIC BLOOD PRESSURE: 70 MMHG | WEIGHT: 290.8 LBS | BODY MASS INDEX: 40.71 KG/M2 | TEMPERATURE: 96.2 F | RESPIRATION RATE: 18 BRPM | HEART RATE: 93 BPM | HEIGHT: 71 IN | OXYGEN SATURATION: 98 % | SYSTOLIC BLOOD PRESSURE: 112 MMHG

## 2023-05-17 DIAGNOSIS — R06.09 DYSPNEA ON EXERTION: ICD-10-CM

## 2023-05-17 DIAGNOSIS — I89.0 LYMPHEDEMA: ICD-10-CM

## 2023-05-17 DIAGNOSIS — Z00.00 ANNUAL PHYSICAL EXAM: Primary | ICD-10-CM

## 2023-05-17 DIAGNOSIS — I83.93 VARICOSE VEINS OF BOTH LOWER EXTREMITIES, UNSPECIFIED WHETHER COMPLICATED: ICD-10-CM

## 2023-05-17 DIAGNOSIS — Z13.6 SCREENING FOR HEART DISEASE: ICD-10-CM

## 2023-05-17 LAB
ATRIAL RATE: 67 BPM
P AXIS: 50 DEGREES
PR INTERVAL: 156 MS
QRS AXIS: -25 DEGREES
QRSD INTERVAL: 110 MS
QT INTERVAL: 388 MS
QTC INTERVAL: 409 MS
T WAVE AXIS: 9 DEGREES
VENTRICULAR RATE: 67 BPM

## 2023-05-17 NOTE — PROGRESS NOTES
Assessment/Plan:   Patient Instructions   General medical exam is good  Have lipid profile done when you can and I will report results to you when available  New referral for lymphedema clinic placed  Follow-up 1 year, sooner as needed  Try using albuterol inhaler prior to exertion to see if it works  Wellness Visit for Adults   AMBULATORY CARE:   A wellness visit  is when you see your healthcare provider to get screened for health problems  Your healthcare provider will also give you advice on how to stay healthy  Write down your questions so you remember to ask them  Ask your healthcare provider how often you should have a wellness visit  What happens at a wellness visit:  Your healthcare provider will ask about your health, and your family history of health problems  This includes high blood pressure, heart disease, and cancer  He or she will ask if you have symptoms that concern you, if you smoke, and about your mood  You may also be asked about your intake of medicines, supplements, food, and alcohol  Any of the following may be done: Your weight  will be checked  Your height may also be checked so your body mass index (BMI) can be calculated  Your BMI shows if you are at a healthy weight  Your blood pressure  and heart rate will be checked  Your temperature may also be checked  Blood and urine tests  may be done  Blood tests may be done to check your cholesterol levels  Abnormal cholesterol levels increase your risk for heart disease and stroke  You may also need a blood or urine test to check for diabetes if you are at increased risk  Urine tests may be done to look for signs of an infection or kidney disease  A physical exam  includes checking your heartbeat and lungs with a stethoscope  Your healthcare provider may also check your skin to look for sun damage  Screening tests  may be recommended  A screening test is done to check for diseases that may not cause symptoms   The screening tests you may need depend on your age, gender, family history, and lifestyle habits  For example, colorectal screening may be recommended if you are 48years old or older  Screening tests you need if you are a woman:   A Pap smear  is used to screen for cervical cancer  Pap smears are usually done every 3 to 5 years depending on your age  You may need them more often if you have had abnormal Pap smear test results in the past  Ask your healthcare provider how often you should have a Pap smear  A mammogram  is an x-ray of your breasts to screen for breast cancer  Experts recommend mammograms every 2 years starting at age 48 years  You may need a mammogram at age 52 years or younger if you have an increased risk for breast cancer  Talk to your healthcare provider about when you should start having mammograms and how often you need them  Vaccines you may need:   Get an influenza vaccine  every year  The influenza vaccine protects you from the flu  Several types of viruses cause the flu  The viruses change over time, so new vaccines are made each year  Get a tetanus-diphtheria (Td) booster vaccine  every 10 years  This vaccine protects you against tetanus and diphtheria  Tetanus is a severe infection that may cause painful muscle spasms and lockjaw  Diphtheria is a severe bacterial infection that causes a thick covering in the back of your mouth and throat  Get a human papillomavirus (HPV) vaccine  if you are female and aged 23 to 32 or male 23 to 24 and never received it  This vaccine protects you from HPV infection  HPV is the most common infection spread by sexual contact  HPV may also cause vaginal, penile, and anal cancers  Get a pneumococcal vaccine  if you are aged 72 years or older  The pneumococcal vaccine is an injection given to protect you from pneumococcal disease  Pneumococcal disease is an infection caused by pneumococcal bacteria   The infection may cause pneumonia, meningitis, or an ear infection  Get a shingles vaccine  if you are 60 or older, even if you have had shingles before  The shingles vaccine is an injection to protect you from the varicella-zoster virus  This is the same virus that causes chickenpox  Shingles is a painful rash that develops in people who had chickenpox or have been exposed to the virus  How to eat healthy:  My Plate is a model for planning healthy meals  It shows the types and amounts of foods that should go on your plate  Fruits and vegetables make up about half of your plate, and grains and protein make up the other half  A serving of dairy is included on the side of your plate  The amount of calories and serving sizes you need depends on your age, gender, weight, and height  Examples of healthy foods are listed below:  Eat a variety of vegetables  such as dark green, red, and orange vegetables  You can also include canned vegetables low in sodium (salt) and frozen vegetables without added butter or sauces  Eat a variety of fresh fruits , canned fruit in 100% juice, frozen fruit, and dried fruit  Include whole grains  At least half of the grains you eat should be whole grains  Examples include whole-wheat bread, wheat pasta, brown rice, and whole-grain cereals such as oatmeal     Eat a variety of protein foods such as seafood (fish and shellfish), lean meat, and poultry without skin (turkey and chicken)  Examples of lean meats include pork leg, shoulder, or tenderloin, and beef round, sirloin, tenderloin, and extra lean ground beef  Other protein foods include eggs and egg substitutes, beans, peas, soy products, nuts, and seeds  Choose low-fat dairy products such as skim or 1% milk or low-fat yogurt, cheese, and cottage cheese  Limit unhealthy fats  such as butter, hard margarine, and shortening  Exercise:  Exercise at least 30 minutes per day on most days of the week  Some examples of exercise include walking, biking, dancing, and swimming  You can also fit in more physical activity by taking the stairs instead of the elevator or parking farther away from stores  Include muscle strengthening activities 2 days each week  Regular exercise provides many health benefits  It helps you manage your weight, and decreases your risk for type 2 diabetes, heart disease, stroke, and high blood pressure  Exercise can also help improve your mood  Ask your healthcare provider about the best exercise plan for you  General health and safety guidelines:   Do not smoke  Nicotine and other chemicals in cigarettes and cigars can cause lung damage  Ask your healthcare provider for information if you currently smoke and need help to quit  E-cigarettes or smokeless tobacco still contain nicotine  Talk to your healthcare provider before you use these products  Limit alcohol  A drink of alcohol is 12 ounces of beer, 5 ounces of wine, or 1½ ounces of liquor  Lose weight, if needed  Being overweight increases your risk of certain health conditions  These include heart disease, high blood pressure, type 2 diabetes, and certain types of cancer  Protect your skin  Do not sunbathe or use tanning beds  Use sunscreen with a SPF 15 or higher  Apply sunscreen at least 15 minutes before you go outside  Reapply sunscreen every 2 hours  Wear protective clothing, hats, and sunglasses when you are outside  Drive safely  Always wear your seatbelt  Make sure everyone in your car wears a seatbelt  A seatbelt can save your life if you are in an accident  Do not use your cell phone when you are driving  This could distract you and cause an accident  Pull over if you need to make a call or send a text message  Practice safe sex  Use latex condoms if are sexually active and have more than one partner  Your healthcare provider may recommend screening tests for sexually transmitted infections (STIs)  Wear helmets, lifejackets, and protective gear    Always wear a helmet when you ride a bike or motorcycle, go skiing, or play sports that could cause a head injury  Wear protective equipment when you play sports  Wear a lifejacket when you are on a boat or doing water sports  © Copyright [employer] 2022 Information is for End User's use only and may not be sold, redistributed or otherwise used for commercial purposes  The above information is an  only  It is not intended as medical advice for individual conditions or treatments  Talk to your doctor, nurse or pharmacist before following any medical regimen to see if it is safe and effective for you  Quality Measures:   Depression Screening and Follow-up Plan: Patient was screened for depression during today's encounter  They screened negative with a PHQ-2 score of 0  Return in about 1 year (around 5/17/2024) for Annual physical          Diagnoses and all orders for this visit:    Annual physical exam    Lymphedema  -     Ambulatory Referral to PT/OT Lymphedema Therapy; Future    Varicose veins of both lower extremities, unspecified whether complicated    Dyspnea on exertion    Screening for heart disease  -     Lipid Panel with Direct LDL reflex; Future        Subjective:      Patient ID: Jaspreet Matson is a 59 y o  female  61-year-old female presents for her annual physical   Overall doing well  Chronic lymphedema secondary to significant varicose veins of the legs  She would like another referral to the lymphedema clinic  Noting exertional shortness of breath  For example walking from the parking lot into the grocery store  If she does not lean on a cart she becomes short of breath  She does note though if she uses her albuterol inhaler it improves  She has never tried using the albuterol inhaler prior to exertion  She had been on Breo, but does not feel the [facility] [place] relief  It is also expensive      EMR has been reviewed, clarified, and updated with patient today       ALLERGIES:  No Known Allergies    CURRENT MEDICATIONS:    Current Outpatient Medications:   •  albuterol (Ventolin HFA) 90 mcg/act inhaler, Inhale 2 puffs every 6 (six) hours as needed for wheezing, Disp: 54 g, Rfl: 3  •  ibuprofen (MOTRIN) 800 mg tablet, TAKE 1 TABLET BY MOUTH EVERY 6 HOURS AS NEEDED FOR MILD PAIN (PAIN SCORE 1-3)  , Disp: , Rfl:   •  verapamil (CALAN-SR) 120 mg CR tablet, Take 1 tablet (120 mg total) by mouth daily at bedtime, Disp: 90 tablet, Rfl: 2  •  eletriptan (RELPAX) 40 MG tablet, 1 tab po at migraine onset, may repeat in 2 hours if needed  Max 2/24 hours, 18 per 2 month  (Patient not taking: Reported on 5/17/2023), Disp: 18 tablet, Rfl: 6  •  Galcanezumab-gnlm (Emgality, 300 MG Dose,) 100 MG/ML SOSY, Inject 3 mL (300 mg total) under the skin every 30 (thirty) days Until cluster cycle stops (Patient not taking: Reported on 5/17/2023), Disp: 3 mL, Rfl: 3    ACTIVE PROBLEM LIST:  Patient Active Problem List   Diagnosis   • Thrombophlebitis of superficial veins of right lower extremity   • Chronic venous insufficiency   • Lymphedema   • Back pain, lumbosacral   • Varicose veins of legs   • Cluster headaches   • Asymptomatic postmenopausal status   • Abnormal resting ECG findings   • Carpal tunnel syndrome   • Chronic peripheral venous hypertension   • Degenerative joint disease of right hip   • Radial styloid tenosynovitis   • Osteoarthritis of acromioclavicular joint   • Spider angioma   • Status post hip surgery   • Bleeding from varicose veins of lower extremity   • Dyspnea on exertion   • Arthralgia of right knee       PAST MEDICAL HISTORY:  Past Medical History:   Diagnosis Date   • Asymptomatic postmenopausal status    • Back pain, lumbosacral    • Biceps tendinitis 3/23/2022   • Cellulitis of left lower extremity     last assessed    3/30/16    resolved  Haylie [place]   9/8/16     • Cellulitis of lower extremity 03/23/2022   • Cluster headaches    • Colon polyps    • CTS (carpal tunnel syndrome) 3 years ago   • Dense breasts    • Foot pain, left    • Head injury     Dec 2017 no LOC   • Hip pain, right    • Lumbosacral pain    • Lymph edema    • Migraine 6 weeks   cluster headaches   • Nosebleed    • Right hip pain    • Spider veins    • Tendinitis of right shoulder    • Varicose veins of legs        PAST SURGICAL HISTORY:  Past Surgical History:   Procedure Laterality Date   • BACK SURGERY      type unknown- occurred during vaginal delivery   • BREAST BIOPSY Left    • HAND TENDON SURGERY     • NV NEUROPLASTY &/TRANSPOS MEDIAN NRV CARPAL TUNNE Right 09/12/2016    Procedure: WRIST OPEN CARPAL TUNNEL RELEASE ;  Surgeon: Mary Gordon MD;  Location: [facility];  Service: Orthopedics   • SINUS SURGERY     • TOTAL HIP ARTHROPLASTY Right 08/24/2020   • TUBAL LIGATION     • VASCULAR SURGERY  various dates   • VEIN LIGATION AND STRIPPING Left        FAMILY HISTORY:  Family History   Problem Relation Age of Onset   • Sudden death Mother         MVA hit by a drunk    • Varicose Veins Father    • Heart disease Father         cardiac disorder   • Cancer Sister         appendex   • Cancer Sister         Appendix   • Cancer Sister         Breast   • Other Maternal Grandmother         gyn problem   • Cervical cancer Maternal Grandmother    • Migraines Daughter    • Breast cancer Maternal Aunt         neoplasm   • Colon cancer Neg Hx    • Ovarian cancer Neg Hx    • Uterine cancer Neg Hx        SOCIAL HISTORY:  Social History     Socioeconomic History   • Marital status: /Civil Union     Spouse name: Not on file   • Number of children: 2   • Years of education: Not on file   • Highest education level: Not on file   Occupational History     Comment: full time - post [employer]   Tobacco Use   • Smoking status: Former     Packs/day: 1 00     Years: 30 00     Pack years: 30 00     Types: Cigarettes     Start date: 10     Quit date: 2/15/2020     Years since quitting: 3 2   • Smokeless tobacco: Never   • Tobacco comments:     started smoking at 13years old   Vaping Use   • Vaping Use: Never used   Substance and Sexual Activity   • Alcohol use: Not Currently     Comment: rare   • Drug use: No   • Sexual activity: Yes     Partners: Male     Birth control/protection: Female Sterilization   Other Topics Concern   • Not on file   Social History Narrative    Brushes teeth twice a day    Dental care regularly        Two children    Works for the post office-retired     Social Determinants of Health     Financial Resource Strain: Not on file   Food Insecurity: Not on file   Transportation Needs: Not on file   Physical Activity: Not on file   Stress: Not on file   Social Connections: Not on file   Intimate Partner Violence: Not on file   Housing Stability: Not on file       Review of Systems   Constitutional: Negative for activity change, chills, fatigue and fever  HENT: Negative for congestion  Eyes: Negative for discharge and visual disturbance  Respiratory: Negative for cough, chest tightness and shortness of breath  Cardiovascular: Positive for leg swelling  Negative for chest pain and palpitations  Gastrointestinal: Negative for abdominal pain, blood in stool, constipation, diarrhea, nausea and vomiting  Endocrine: Negative for polydipsia, polyphagia and polyuria  Genitourinary: Negative for difficulty urinating  Musculoskeletal: Negative for arthralgias and myalgias  Skin: Negative for rash  Allergic/Immunologic: Negative for immunocompromised state  Neurological: Negative for dizziness, syncope, weakness, light-headedness and headaches  Hematological: Negative for adenopathy  Does not bruise/bleed easily  Psychiatric/Behavioral: Negative for dysphoric mood, sleep disturbance and suicidal ideas  The patient is not nervous/anxious            Objective:  Vitals:    05/17/23 1332   BP: 112/70   BP Location: Right arm   Patient Position: Sitting   Cuff Size: Large   Pulse: 93   Resp: 18 "  Temp: (!) 96 2 °F (35 7 °C)   TempSrc: Tympanic   SpO2: 98%   Weight: 132 kg (290 lb 12 8 oz)   Height: 5' 11\" (1 803 m)     Body mass index is 40 56 kg/m²  Physical Exam  Vitals and nursing note reviewed  Constitutional:       General: She is not in acute distress  Appearance: She is well-developed  She is obese  She is not ill-appearing  HENT:      Head: Normocephalic and atraumatic  Right Ear: Tympanic membrane, ear canal and external ear normal       Left Ear: Tympanic membrane, ear canal and external ear normal       Nose: Nose normal       Mouth/Throat:      Mouth: Mucous membranes are moist       Pharynx: Oropharynx is clear  Eyes:      General: No scleral icterus  Right eye: No discharge  Left eye: No discharge  Extraocular Movements: Extraocular movements intact  Conjunctiva/sclera: Conjunctivae normal       Pupils: Pupils are equal, round, and reactive to light  Neck:      Thyroid: No thyromegaly  Vascular: No carotid bruit  Cardiovascular:      Rate and Rhythm: Normal rate and regular rhythm  Pulses: Normal pulses  Heart sounds: Normal heart sounds  No murmur heard  Pulmonary:      Effort: Pulmonary effort is normal  No respiratory distress  Breath sounds: Normal breath sounds  Chest:      Chest wall: No tenderness  Abdominal:      General: Abdomen is flat  Bowel sounds are normal  There is no distension  Palpations: Abdomen is soft  There is no hepatomegaly, splenomegaly or mass  Tenderness: There is no abdominal tenderness  There is no right CVA tenderness, left CVA tenderness, guarding or rebound  Comments: Obese   Musculoskeletal:         General: No swelling  Normal range of motion  Cervical back: Normal range of motion and neck supple  Right lower leg: Edema (Chronic lymphedema) present  Left lower leg: Edema (Chronic lymphedema) present        Comments: Multiple varicose veins of bilateral lower " legs   Lymphadenopathy:      Cervical: No cervical adenopathy  Skin:     General: Skin is warm and dry  Findings: No rash  Neurological:      General: No focal deficit present  Mental Status: She is alert and oriented to person, place, and time  Cranial Nerves: No cranial nerve deficit  Sensory: No sensory deficit  Motor: No weakness  Coordination: Coordination normal       Gait: Gait normal       Deep Tendon Reflexes: Reflexes are normal and symmetric  Reflexes normal    Psychiatric:         Mood and Affect: Mood normal          Behavior: Behavior normal          Thought Content: Thought content normal          Judgment: Judgment normal            RESULTS:    In chart    This note was created with voice recognition software  Phonic, grammatical and spelling errors may be present within the note as a result

## 2023-05-17 NOTE — PATIENT INSTRUCTIONS
General medical exam is good  Have lipid profile done when you can and I will report results to you when available  New referral for lymphedema clinic placed  Follow-up 1 year, sooner as needed  Try using albuterol inhaler prior to exertion to see if it works  Wellness Visit for Adults   AMBULATORY CARE:   A wellness visit  is when you see your healthcare provider to get screened for health problems  Your healthcare provider will also give you advice on how to stay healthy  Write down your questions so you remember to ask them  Ask your healthcare provider how often you should have a wellness visit  What happens at a wellness visit:  Your healthcare provider will ask about your health, and your family history of health problems  This includes high blood pressure, heart disease, and cancer  He or she will ask if you have symptoms that concern you, if you smoke, and about your mood  You may also be asked about your intake of medicines, supplements, food, and alcohol  Any of the following may be done: Your weight  will be checked  Your height may also be checked so your body mass index (BMI) can be calculated  Your BMI shows if you are at a healthy weight  Your blood pressure  and heart rate will be checked  Your temperature may also be checked  Blood and urine tests  may be done  Blood tests may be done to check your cholesterol levels  Abnormal cholesterol levels increase your risk for heart disease and stroke  You may also need a blood or urine test to check for diabetes if you are at increased risk  Urine tests may be done to look for signs of an infection or kidney disease  A physical exam  includes checking your heartbeat and lungs with a stethoscope  Your healthcare provider may also check your skin to look for sun damage  Screening tests  may be recommended  A screening test is done to check for diseases that may not cause symptoms   The screening tests you may need depend on your age, gender, family history, and lifestyle habits  For example, colorectal screening may be recommended if you are 48years old or older  Screening tests you need if you are a woman:   A Pap smear  is used to screen for cervical cancer  Pap smears are usually done every 3 to 5 years depending on your age  You may need them more often if you have had abnormal Pap smear test results in the past  Ask your healthcare provider how often you should have a Pap smear  A mammogram  is an x-ray of your breasts to screen for breast cancer  Experts recommend mammograms every 2 years starting at age 48 years  You may need a mammogram at age 52 years or younger if you have an increased risk for breast cancer  Talk to your healthcare provider about when you should start having mammograms and how often you need them  Vaccines you may need:   Get an influenza vaccine  every year  The influenza vaccine protects you from the flu  Several types of viruses cause the flu  The viruses change over time, so new vaccines are made each year  Get a tetanus-diphtheria (Td) booster vaccine  every 10 years  This vaccine protects you against tetanus and diphtheria  Tetanus is a severe infection that may cause painful muscle spasms and lockjaw  Diphtheria is a severe bacterial infection that causes a thick covering in the back of your mouth and throat  Get a human papillomavirus (HPV) vaccine  if you are female and aged 23 to 32 or male 23 to 24 and never received it  This vaccine protects you from HPV infection  HPV is the most common infection spread by sexual contact  HPV may also cause vaginal, penile, and anal cancers  Get a pneumococcal vaccine  if you are aged 72 years or older  The pneumococcal vaccine is an injection given to protect you from pneumococcal disease  Pneumococcal disease is an infection caused by pneumococcal bacteria  The infection may cause pneumonia, meningitis, or an ear infection      Get a shingles vaccine if you are 60 or older, even if you have had shingles before  The shingles vaccine is an injection to protect you from the varicella-zoster virus  This is the same virus that causes chickenpox  Shingles is a painful rash that develops in people who had chickenpox or have been exposed to the virus  How to eat healthy:  My Plate is a model for planning healthy meals  It shows the types and amounts of foods that should go on your plate  Fruits and vegetables make up about half of your plate, and grains and protein make up the other half  A serving of dairy is included on the side of your plate  The amount of calories and serving sizes you need depends on your age, gender, weight, and height  Examples of healthy foods are listed below:  Eat a variety of vegetables  such as dark green, red, and orange vegetables  You can also include canned vegetables low in sodium (salt) and frozen vegetables without added butter or sauces  Eat a variety of fresh fruits , canned fruit in 100% juice, frozen fruit, and dried fruit  Include whole grains  At least half of the grains you eat should be whole grains  Examples include whole-wheat bread, wheat pasta, brown rice, and whole-grain cereals such as oatmeal     Eat a variety of protein foods such as seafood (fish and shellfish), lean meat, and poultry without skin (turkey and chicken)  Examples of lean meats include pork leg, shoulder, or tenderloin, and beef round, sirloin, tenderloin, and extra lean ground beef  Other protein foods include eggs and egg substitutes, beans, peas, soy products, nuts, and seeds  Choose low-fat dairy products such as skim or 1% milk or low-fat yogurt, cheese, and cottage cheese  Limit unhealthy fats  such as butter, hard margarine, and shortening  Exercise:  Exercise at least 30 minutes per day on most days of the week  Some examples of exercise include walking, biking, dancing, and swimming   You can also fit in more physical activity by taking the stairs instead of the elevator or parking farther away from stores  Include muscle strengthening activities 2 days each week  Regular exercise provides many health benefits  It helps you manage your weight, and decreases your risk for type 2 diabetes, heart disease, stroke, and high blood pressure  Exercise can also help improve your mood  Ask your healthcare provider about the best exercise plan for you  General health and safety guidelines:   Do not smoke  Nicotine and other chemicals in cigarettes and cigars can cause lung damage  Ask your healthcare provider for information if you currently smoke and need help to quit  E-cigarettes or smokeless tobacco still contain nicotine  Talk to your healthcare provider before you use these products  Limit alcohol  A drink of alcohol is 12 ounces of beer, 5 ounces of wine, or 1½ ounces of liquor  Lose weight, if needed  Being overweight increases your risk of certain health conditions  These include heart disease, high blood pressure, type 2 diabetes, and certain types of cancer  Protect your skin  Do not sunbathe or use tanning beds  Use sunscreen with a SPF 15 or higher  Apply sunscreen at least 15 minutes before you go outside  Reapply sunscreen every 2 hours  Wear protective clothing, hats, and sunglasses when you are outside  Drive safely  Always wear your seatbelt  Make sure everyone in your car wears a seatbelt  A seatbelt can save your life if you are in an accident  Do not use your cell phone when you are driving  This could distract you and cause an accident  Pull over if you need to make a call or send a text message  Practice safe sex  Use latex condoms if are sexually active and have more than one partner  Your healthcare provider may recommend screening tests for sexually transmitted infections (STIs)  Wear helmets, lifejackets, and protective gear    Always wear a helmet when you ride a bike or motorcycle, go skiing, or play sports that could cause a head injury  Wear protective equipment when you play sports  Wear a lifejacket when you are on a boat or doing water sports  © Copyright [facility] 2022 Information is for End User's use only and may not be sold, redistributed or otherwise used for commercial purposes  The above information is an  only  It is not intended as medical advice for individual conditions or treatments  Talk to your doctor, nurse or pharmacist before following any medical regimen to see if it is safe and effective for you

## 2023-07-10 NOTE — PATIENT INSTRUCTIONS
Additional Testing:   Neurodiagnostic workup:   MRI brain with without contrast with labs prior    Headache/migraine treatment:   Abortive medications (for immediate treatment of a headache): It is ok to take ibuprofen, acetaminophen or naproxen (Advil, Tylenol,  Aleve, Excedrin) if they help your headaches you should limit these to No more than 3 times a week to avoid medication overuse/rebound headaches  For your more moderate to severe migraines take this medication early   Eletriptan 40mg tabs - take one at the onset of headache  May repeat one time after 1-2 hours if pain has not resolved  Max 2 a day    Prescription preventive medications for headaches/migraines   (to take every day to help prevent headaches - not to take at the time of headache):  [x] continue Lyrica/pregabalin at current dose, could not consider increasing the future if needed  -continue verapamil 240 mg daily at bedtime however during a cluster episode we could acutely increase this and we could always consider in the future also changing this to short-acting verapamil which sometimes works better than long-acting  -decrease amitriptyline to 20 mg nightly for 2 weeks and then 10 mg nightly for 2 weeks and then stop    *Typically these types of medications take time untill you see the benefit, although some may see improvement in days, often it may take weeks, especially if the medication is being titrated up to a beneficial level  Please contact us if there are any concerns or questions regarding the medication  Sleep/headache prevention:    [x] Melatonin - you may take 3 mg nightly for sleep  You should take this 1 hour prior to bedtime consistently every night for it to work  It works by gradually helping to adjust your sleep time over days to weeks, rather than immediately making you feel sleepy  Self-Monitoring:  [x] Headache calendar   Each day stefanie a number from 0-10 indicating if there was a headache and how bad it full code was   This can be used to monitor gradual improvement and is helpful to make medication adjustments  You can do this on paper or there is an SEAN for a smart phone called "Migraine e Diary"  Lifestyle Recommendations:  [x] SLEEP - Maintain a regular sleep schedule: Adults need at least 7-8 hours of uninterrupted a night  Maintain good sleep hygiene:  Going to bed and waking up at consistent times, avoiding excessive daytime naps, avoiding caffeinated beverages in the evening, avoid excessive stimulation in the evening and generally using bed primarily for sleeping  One hour before bedtime would recommend turning lights down lower, decreasing your activity (may read quietly, listen to music at a low volume)  When you get into bed, should eliminate all technology (no texting, emailing, playing with your phone, iPad or tablet in bed)  [x] HYDRATION - Maintain good hydration  Drink  2L of fluid a day (4 typical small water bottles)  [x] DIET - Maintain good nutrition  In particular don't skip meals and try and eat healthy balanced meals regularly  [x] TRIGGERS - Look for other triggers and avoid them: Limit caffeine to 1-2 cups a day or less  Avoid dietary triggers that you have noticed bring on your headaches (this could include aged cheese, peanuts, MSG, aspartame and nitrates)  [x] EXERCISE - physical exercise as we all know is good for you in many ways, and not only is good for your heart, but also is beneficial for your mental health, cognitive health and  chronic pain/headaches  I would encourage at the least 5 days of physical exercise weekly for at least 30 minutes  Education and Follow-up  [x] Please call with any questions or concerns  Of course if any new concerning symptoms go to the emergency department    [x] Follow up with Dr Sabrina Bridges in 3 months and Dr Aleshia Chin in the future who is our top headache specialist     - As we discussed if there are no abnormalities on the brain MRI that need urgent intervention (very often there are incidental findings that may not mean anything significant and are better discussed in person), you will not necessarily receive a call from us, but feel free to call in to check on the status of the report (since not knowing can be anxiety provoking) and the nurses will let you know the result or make sure I have nothing to pass on regarding the results first   Ideally, all of this will be discussed in detail in the follow-up visit  full code

## 2023-07-12 ENCOUNTER — OFFICE VISIT (OUTPATIENT)
Dept: INTERNAL MEDICINE CLINIC | Facility: CLINIC | Age: 58
End: 2023-07-12
Payer: COMMERCIAL

## 2023-07-12 VITALS
WEIGHT: 289.2 LBS | DIASTOLIC BLOOD PRESSURE: 60 MMHG | HEIGHT: 71 IN | RESPIRATION RATE: 16 BRPM | HEART RATE: 78 BPM | BODY MASS INDEX: 40.49 KG/M2 | SYSTOLIC BLOOD PRESSURE: 110 MMHG | OXYGEN SATURATION: 96 % | TEMPERATURE: 99.2 F

## 2023-07-12 DIAGNOSIS — J01.40 ACUTE NON-RECURRENT PANSINUSITIS: Primary | ICD-10-CM

## 2023-07-12 PROCEDURE — 99213 OFFICE O/P EST LOW 20 MIN: CPT | Performed by: PHYSICIAN ASSISTANT

## 2023-07-12 RX ORDER — AZITHROMYCIN 250 MG/1
TABLET, FILM COATED ORAL
Qty: 6 TABLET | Refills: 0 | Status: SHIPPED | OUTPATIENT
Start: 2023-07-12 | End: 2023-07-16

## 2023-07-12 RX ORDER — MELOXICAM 15 MG/1
15 TABLET ORAL DAILY
COMMUNITY
Start: 2023-06-29

## 2023-07-12 NOTE — PROGRESS NOTES
Assessment/Plan:   Patient Instructions   Rest, increase fluids. Tylenol for fever or aches as per package instructions. Start and finish antibiotic. Take with food. Always wise to take a probiotic and or eat yogurt daily when on an antibiotic. Please keep me informed if not steadily improving. Quality Measures:   Depression Screening and Follow-up Plan: Patient was screened for depression during today's encounter. They screened negative with a PHQ-2 score of 0. Return if symptoms worsen or fail to improve. Diagnoses and all orders for this visit:    Acute non-recurrent pansinusitis  -     azithromycin (ZITHROMAX) 250 mg tablet; Take 2 tablets today then 1 tablet daily x 4 days    Other orders  -     meloxicam (MOBIC) 15 mg tablet; Take 15 mg by mouth daily          Subjective:      Patient ID: Khloe Roach is a 59 y.o. female. Acute visit    Feeling well for over 10 days. Initially started with sore throat, then developed head congestion, facial pressure, postnasal drip, and now a persistent sore throat especially in the morning. Coughing secondary to postnasal drip. Tessalon Perles not helping. Initially had fever but not now. No earaches. ALLERGIES:  No Known Allergies    CURRENT MEDICATIONS:    Current Outpatient Medications:   •  albuterol (Ventolin HFA) 90 mcg/act inhaler, Inhale 2 puffs every 6 (six) hours as needed for wheezing, Disp: 54 g, Rfl: 3  •  azithromycin (ZITHROMAX) 250 mg tablet, Take 2 tablets today then 1 tablet daily x 4 days, Disp: 6 tablet, Rfl: 0  •  eletriptan (RELPAX) 40 MG tablet, 1 tab po at migraine onset, may repeat in 2 hours if needed.  Max 2/24 hours, 18 per 2 month., Disp: 18 tablet, Rfl: 6  •  Galcanezumab-gnlm (Emgality, 300 MG Dose,) 100 MG/ML SOSY, Inject 3 mL (300 mg total) under the skin every 30 (thirty) days Until cluster cycle stops, Disp: 3 mL, Rfl: 3  •  ibuprofen (MOTRIN) 800 mg tablet, TAKE 1 TABLET BY MOUTH EVERY 6 HOURS AS NEEDED FOR MILD PAIN (PAIN SCORE 1-3). , Disp: , Rfl:   •  meloxicam (MOBIC) 15 mg tablet, Take 15 mg by mouth daily, Disp: , Rfl:   •  verapamil (CALAN-SR) 120 mg CR tablet, Take 1 tablet (120 mg total) by mouth daily at bedtime, Disp: 90 tablet, Rfl: 2    ACTIVE PROBLEM LIST:  Patient Active Problem List   Diagnosis   • Thrombophlebitis of superficial veins of right lower extremity   • Chronic venous insufficiency   • Lymphedema   • Back pain, lumbosacral   • Varicose veins of legs   • Cluster headaches   • Asymptomatic postmenopausal status   • Abnormal resting ECG findings   • Carpal tunnel syndrome   • Chronic peripheral venous hypertension   • Degenerative joint disease of right hip   • Radial styloid tenosynovitis   • Osteoarthritis of acromioclavicular joint   • Spider angioma   • Status post hip surgery   • Bleeding from varicose veins of lower extremity   • Dyspnea on exertion   • Arthralgia of right knee       PAST MEDICAL HISTORY:  Past Medical History:   Diagnosis Date   • Asymptomatic postmenopausal status    • Back pain, lumbosacral    • Biceps tendinitis 3/23/2022   • Cellulitis of left lower extremity     last assessed. ... 3/30/16    resolved. Mariea Show .. 9/8/16     • Cellulitis of lower extremity 03/23/2022   • Cluster headaches    • Colon polyps    • CTS (carpal tunnel syndrome) 3 years ago   • Dense breasts    • Foot pain, left    • Head injury     Dec 2017 no LOC   • Hip pain, right    • Lumbosacral pain    • Lymph edema    • Migraine 6 weeks   cluster headaches   • Nosebleed    • Right hip pain    • Spider veins    • Tendinitis of right shoulder    • Varicose veins of legs        PAST SURGICAL HISTORY:  Past Surgical History:   Procedure Laterality Date   • BACK SURGERY      type unknown- occurred during vaginal delivery   • BREAST BIOPSY Left    • HAND TENDON SURGERY     • PA NEUROPLASTY &/TRANSPOS MEDIAN NRV CARPAL TUNNE Right 09/12/2016    Procedure: WRIST OPEN CARPAL TUNNEL RELEASE ;  Surgeon: Ivette Scott Keith Skinner MD;  Location: [facility];  Service: Orthopedics   • SINUS SURGERY     • TOTAL HIP ARTHROPLASTY Right 08/24/2020   • TUBAL LIGATION     • VASCULAR SURGERY  various dates   • VEIN LIGATION AND STRIPPING Left        FAMILY HISTORY:  Family History   Problem Relation Age of Onset   • Sudden death Mother         MVA hit by a drunk    • Varicose Veins Father    • Heart disease Father         cardiac disorder   • Cancer Sister         appendex   • Cancer Sister         Appendix   • Cancer Sister         Breast   • Other Maternal Grandmother         gyn problem   • Cervical cancer Maternal Grandmother    • Migraines Daughter    • Breast cancer Maternal Aunt         neoplasm   • Colon cancer Neg Hx    • Ovarian cancer Neg Hx    • Uterine cancer Neg Hx        SOCIAL HISTORY:  Social History     Socioeconomic History   • Marital status: /Civil Union     Spouse name: Not on file   • Number of children: 2   • Years of education: Not on file   • Highest education level: Not on file   Occupational History     Comment: full time - post [employer]   Tobacco Use   • Smoking status: Former     Packs/day: 1.00     Years: 30.00     Total pack years: 30.00     Types: Cigarettes     Start date: 10     Quit date: 2/15/2020     Years since quitting: 3.4   • Smokeless tobacco: Never   • Tobacco comments:     started smoking at 13years old   Vaping Use   • Vaping Use: Never used   Substance and Sexual Activity   • Alcohol use: Not Currently     Comment: rare   • Drug use: No   • Sexual activity: Yes     Partners: Male     Birth control/protection: Female Sterilization   Other Topics Concern   • Not on file   Social History Narrative    Brushes teeth twice a day    Dental care regularly        Two children    Works for the post office-retired     Social Determinants of Health     Financial Resource Strain: Not on file   Food Insecurity: Not on file   Transportation Needs: Not on file   Physical Activity: Not on file   Stress: Not on file   Social Connections: Not on file   Intimate Partner Violence: Not on file   Housing Stability: Not on file       Review of Systems   Constitutional: Positive for fatigue. Negative for activity change, chills and fever. HENT: Positive for congestion, postnasal drip, rhinorrhea, sinus pressure, sneezing and sore throat. Negative for ear pain and trouble swallowing. Eyes: Negative for discharge, redness and itching. Respiratory: Positive for cough. Negative for chest tightness, shortness of breath and wheezing. Cardiovascular: Negative for chest pain, palpitations and leg swelling. Gastrointestinal: Negative for abdominal pain. Genitourinary: Negative for difficulty urinating. Musculoskeletal: Negative for arthralgias and myalgias. Skin: Negative for rash. Allergic/Immunologic: Negative for immunocompromised state. Neurological: Negative for dizziness, syncope, weakness, light-headedness and headaches. Hematological: Negative for adenopathy. Does not bruise/bleed easily. Psychiatric/Behavioral: Negative for dysphoric mood. The patient is not nervous/anxious. Objective:  Vitals:    07/12/23 1253   BP: 110/60   BP Location: Left arm   Patient Position: Sitting   Cuff Size: Adult   Pulse: 78   Resp: 16   Temp: 99.2 °F (37.3 °C)   TempSrc: Tympanic   SpO2: 96%   Weight: 131 kg (289 lb 3.2 oz)   Height: 5' 11" (1.803 m)     Body mass index is 40.34 kg/m². Physical Exam  Vitals and nursing note reviewed. Constitutional:       General: She is not in acute distress. Appearance: She is well-developed. She is obese. She is not ill-appearing. HENT:      Head: Normocephalic and atraumatic.       Comments: HEENT-injected conjunctivae, TMs dull with fluid behind, nasal mucosa hyperemic with yellow/green mucoid drainage, injected red posterior pharynx with yellow/green post nasal drainage, tender maxillary sinus region  Eyes:      Extraocular Movements: Extraocular movements intact. Pupils: Pupils are equal, round, and reactive to light. Neck:      Thyroid: No thyromegaly. Vascular: No carotid bruit or JVD. Cardiovascular:      Rate and Rhythm: Normal rate and regular rhythm. Heart sounds: Normal heart sounds. Pulmonary:      Effort: Pulmonary effort is normal. No respiratory distress. Breath sounds: Normal breath sounds. Musculoskeletal:      Cervical back: Neck supple. Right lower leg: No edema. Left lower leg: No edema. Lymphadenopathy:      Cervical: No cervical adenopathy. Skin:     General: Skin is warm and dry. Findings: No rash. Neurological:      General: No focal deficit present. Mental Status: She is alert and oriented to person, place, and time. Mental status is at baseline. Psychiatric:         Mood and Affect: Mood normal.         Behavior: Behavior normal.           RESULTS:    In chart    This note was created with voice recognition software. Phonic, grammatical and spelling errors may be present within the note as a result.

## 2023-07-12 NOTE — PATIENT INSTRUCTIONS
Rest, increase fluids. Tylenol for fever or aches as per package instructions. Start and finish antibiotic. Take with food. Always wise to take a probiotic and or eat yogurt daily when on an antibiotic. Please keep me informed if not steadily improving.

## 2023-10-19 ENCOUNTER — TELEPHONE (OUTPATIENT)
Dept: NEUROLOGY | Facility: CLINIC | Age: 58
End: 2023-10-19

## 2023-10-19 NOTE — TELEPHONE ENCOUNTER
Called and spoke to patient to confirm their upcoming appointment with Dr. Faustina Mcdonough. Informed patient about calling 15 minutes prior to their appointment to get checked in and going over chart.

## 2023-10-26 ENCOUNTER — TELEMEDICINE (OUTPATIENT)
Dept: NEUROLOGY | Facility: CLINIC | Age: 58
End: 2023-10-26
Payer: COMMERCIAL

## 2023-10-26 DIAGNOSIS — G44.029 CHRONIC CLUSTER HEADACHE, NOT INTRACTABLE: ICD-10-CM

## 2023-10-26 PROCEDURE — 99214 OFFICE O/P EST MOD 30 MIN: CPT | Performed by: PSYCHIATRY & NEUROLOGY

## 2023-10-26 RX ORDER — ELETRIPTAN HYDROBROMIDE 40 MG/1
TABLET, FILM COATED ORAL
Qty: 27 TABLET | Refills: 3 | Status: SHIPPED | OUTPATIENT
Start: 2023-10-26

## 2023-10-26 NOTE — PATIENT INSTRUCTIONS
As we discussed I highly highly recommend sleep study as others have recommended as well and even last visit recommended going on the wait list as the sooner you get the study done the better. It appears you have no longer planning on getting a sleep study which I encouraged you to do today due to the significant morbidity and mortality if this remains untreated as well as the relationship to cluster headaches. Certainly if the cluster headaches return I would recommend that even more strongly. Headache/migraine treatment:   Abortive medications (for immediate treatment of a headache): It is ok to take ibuprofen, acetaminophen or naproxen (Advil, Tylenol,  Aleve, Excedrin) if they help your headaches you should limit these to No more than 3 times a week to avoid medication overuse/rebound headaches. For your more moderate to severe migraines take this medication early   Eletriptan 40mg tabs - take one at the onset of headache. May repeat one time after 1-2 hours if pain has not resolved. Max 2 a day      Prescription preventive medications for headaches/migraines   (to take every day to help prevent headaches - not to take at the time of headache):  [x] amitriptyline - Decrease to 20 mg nightly for  2 week, then 10 mg for 2 weeks and then stop    verapamil - changing to 120 ER let me know if too pricey and I will change back       *Typically these types of medications take time untill you see the benefit, although some may see improvement in days, often it may take weeks, especially if the medication is being titrated up to a beneficial level. Please contact us if there are any concerns or questions regarding the medication. Lifestyle Recommendations:  [x] SLEEP - Maintain a regular sleep schedule: Adults need at least 7-8 hours of uninterrupted a night.  Maintain good sleep hygiene:  Going to bed and waking up at consistent times, avoiding excessive daytime naps, avoiding caffeinated beverages in the evening, avoid excessive stimulation in the evening and generally using bed primarily for sleeping. One hour before bedtime would recommend turning lights down lower, decreasing your activity (may read quietly, listen to music at a low volume). When you get into bed, should eliminate all technology (no texting, emailing, playing with your phone, iPad or tablet in bed). [x] HYDRATION - Maintain good hydration. Drink  2L of fluid a day (4 typical small water bottles)  [x] DIET - Maintain good nutrition. In particular don't skip meals and try and eat healthy balanced meals regularly. [x] TRIGGERS - Look for other triggers and avoid them: Limit caffeine to 1-2 cups a day or less. Avoid dietary triggers that you have noticed bring on your headaches (this could include aged cheese, peanuts, MSG, aspartame and nitrates). [x] EXERCISE - physical exercise as we all know is good for you in many ways, and not only is good for your heart, but also is beneficial for your mental health, cognitive health and  chronic pain/headaches. I would encourage at the least 5 days of physical exercise weekly for at least 30 minutes. Education and Follow-up  [x] Please call with any questions or concerns. Of course if any new concerning symptoms go to the emergency department.   - Follow up 1 year, sooner if needed

## 2023-10-26 NOTE — PROGRESS NOTES
Virtual Regular Visit    Verification of patient location:    Patient is located at Home in the following state in which I hold an active license [place]      Assessment/Plan:    Problem List Items Addressed This Visit    None           Reason for visit is No chief complaint on file. Encounter provider Fae Canavan, MD    Provider located at [address] [place] [address]  [address] [place] [id number]      Recent Visits  No visits were found meeting these conditions. Showing recent visits within past 7 days and meeting all other requirements  Future Appointments  No visits were found meeting these conditions. Showing future appointments within next 150 days and meeting all other requirements       The patient was identified by name and date of birth. Jerry Weinstein was informed that this is a telemedicine visit and that the visit is being conducted through the "[employer]. She agrees to proceed. .  My office door was closed. The patient was notified the following individuals were present in the room DENZEL Leonardo could not hear or see her. She acknowledged consent and understanding of privacy and security of the video platform. The patient has agreed to participate and understands they can discontinue the visit at any time. Patient is aware this is a billable service. Subjective    Assessment/Plan:   Jerry Weinstein is a delightful 59 y.o. female with a past medical history that includes cluster headaches, chronic venous insufficiency, lymphedema, chronic back pain on and off, hip arthritis, obesity referred here for evaluation of headache. She has followed with my neurology colleagues in the past  My initial visit 01/24/2020     Chronic cluster headache, not intractable  Patient reports a long history of chronic headaches since her 35s.   She reports her headaches, in clusters typically daily for 3 months at a time and occur in the evenings. They can last anywhere from 15 minutes up to an hour and may happen 3 times at night and 2 times before bed. They tend to build until they reach maximal intensity and then resolve. She reports may start with right teeth pain and then become right frontal and right retro-orbital stabbing pain although the most recent episode was mid frontal sharp electrical.  Pain is severe. She reports associated features include photophobia, photophobia, right eye lacrimation, nasal congestion.   - as of 1/24/2020: 3 months at a time will have daily headaches  - As of 05/29/2020: no headaches since last time, weaned herself off of lyrica and verapamil and just on amitriptyline 50 mg nightly and doing well. Computer read of recent EKG showed likely incorrect abnormalities since she was told it was normal, just want her PCP to look over it to make sure this is the case while on amitriptyline. - As of 9/11/2020:  No headaches since last visit, accidentally stop taking amitriptyline 50 mg nightly for 1 week and will restart, refilled today. We discussed plan for next time she has a cluster headache cycle - plan to start emgality - she will call in right went cycle starts. - as of 2/26/2021:  She has not had significant headache in the past year on amitriptyline 50 mg nightly. - as of 8/4/2022: after years of doing very well, cluster returned mid July. Started emgality 300 mg monthly for cluster headache 7/27/22 which helped a few days and may help more so continue. Decadron has not helped. Will add depakote and if needed also verapamil, both of which she has tolerated in the past for cluster. Now just weighing rapidly increasing meds vs possible side effects of BP dropping, she has cuff at home.  Also recommended sleep study as TOBIAS may be contributing.   - as of 8/18/2022: She reports no improvement and worsening when she tried depakote 1000 mg for 5 nights, verapamil has already helped (along with continuing emgality 300 mg monthly, amitriptyline 50 mg) and will continue titration up from 120 mg BID where she had 4 days no headaches, last 2 days 1 cluster headache each day that resolves with triptan. Titration gradually discussed if needed up to 120 mg TID with room to go higher if needed and no SE.   - as of 9/1/2022:  She reports improvement since last visit on current medication combination with no cluster headaches the last 3 days  On amitriptyline 50 mg, emgality 300 mg monthly, verapamil 120 mg t.i.d. and we discussed room to gradually increase verapamil if headaches return as long as blood pressure tolerates.  -as of 09/22/2022 she increased verapamil to 160/120/240 starting 09/12/2022 (and stopped the daily eletripan she had been taking once she realized this can make it worse) and has had significant improvement with last headache cluster 09/17/2022. We discussed I highly suspect sleep apnea triggering these typically nighttime cluster headaches, so may be hard to completely get out of the cycle until this is evaluated and treated. In the meantime will continue current dose of verapamil, but if she remains controlled at current dose can discontinue the extra 40 mg in a.m. once she runs out in a few weeks. For now continue amitriptyline 50 mg nightly, but did not prevent this cluster and I tend to wean people off this at later years due to potential side effects so we will likely wean this off 1st after next visit and continue verapamil for prevention. For now continue emgality, but can  and hold next dose and see if headaches come back first before giving as it is unclear what has made her lymphedema worse. - as of 1/27/2023: No headaches since last visit, a few months ago she weaned herself down to verapamil 120 mg at night (will change to ER) and amitriptyline 25 mg at night, will wean off since did not prevent last episode.   - as of 10/26/2023: She reports doing well with no cluster headaches since last visit and we looked back it seems none since just over a year ago 2022. She is taking verapamil 120 mg at night and denies any bothersome side effects. The backup Emgality was thrown out due to . She has a new shot she gets episodically for arthritic knee pain which is helping her cut back on the daily meloxicam/Mobic. Did not end up going for sleep study which we discussed in depth I still highly recommended discussed the morbidity and mortality of has sleep apnea and remains untreated. I would reorder for her if she needs me to, but I have never ordered the one Dr. Stephani Hutchinson did on 22, either way this is something that is very important. Workup:  - sleep study ordered by Pulm 22 and scheduled for 23, she has to reschedule  -MRI brain with and without contrast 2020:  Per radiology read no acute disease. 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 No orbital pathology. *As of my retrospective review 10/26/2023 she has partially empty sella although only slightly so, prominent optic nerve sheath bilaterally and optic nerve tortuosity, cerebellar tonsils overlie the foramen magnum with pointed tip with tight junction bilaterally without Chiari officially     Preventative:  - we discussed headache hygiene and lifestyle factors that may improve headaches  -  back up: emgality - SubQ: 300 mg at the onset of the cluster period and then once monthly until the end of the cluster period. -   verapamil (CALAN) 120 mg - will change to ER. Discussed proper use, possible side effects and risks.   - past/failed:  Carbamazepine, Depakote 500 mg and 1000 mg did not help in fact made headaches worse, topiramate, gabapentin, metoprolol, lyrica, verapamil, oxcarbazepine   -Future options: Acetazolamide/Diamox     Abortive:  - discussed not taking over-the-counter or prescription pain medications more than 3 days per week to prevent medication overuse/rebound headache  - Relpax/eletriptan helps. Discussed proper use, possible side effects and risks.  -past:  Steroids helped in the past but lately has not helped, sumatriptan p.o. Nasal helped for a little while and was too expensive, Depakote 500 mg and 1000 mg did not help in fact made headaches worse, rizatriptan she believes was helpful but too expensive - typically the pain starts too fast and ends before she is able to take something           Patient instructions        As we discussed I highly highly recommend sleep study as others have recommended as well and even last visit recommended going on the wait list as the sooner you get the study done the better. It appears you have no longer planning on getting a sleep study which I encouraged you to do today due to the significant morbidity and mortality if this remains untreated as well as the relationship to cluster headaches. Certainly if the cluster headaches return I would recommend that even more strongly. Headache/migraine treatment:   Abortive medications (for immediate treatment of a headache): It is ok to take ibuprofen, acetaminophen or naproxen (Advil, Tylenol,  Aleve, Excedrin) if they help your headaches you should limit these to No more than 3 times a week to avoid medication overuse/rebound headaches. For your more moderate to severe migraines take this medication early   Eletriptan 40mg tabs - take one at the onset of headache. May repeat one time after 1-2 hours if pain has not resolved.     Max 2 a day      Prescription preventive medications for headaches/migraines   (to take every day to help prevent headaches - not to take at the time of headache):  [x] amitriptyline - Decrease to 20 mg nightly for  2 week, then 10 mg for 2 weeks and then stop    verapamil - changing to 120 ER let me know if too pricey and I will change back       *Typically these types of medications take time untill you see the benefit, although some may see improvement in days, often it may take weeks, especially if the medication is being titrated up to a beneficial level. Please contact us if there are any concerns or questions regarding the medication. Lifestyle Recommendations:  [x] SLEEP - Maintain a regular sleep schedule: Adults need at least 7-8 hours of uninterrupted a night. Maintain good sleep hygiene:  Going to bed and waking up at consistent times, avoiding excessive daytime naps, avoiding caffeinated beverages in the evening, avoid excessive stimulation in the evening and generally using bed primarily for sleeping. One hour before bedtime would recommend turning lights down lower, decreasing your activity (may read quietly, listen to music at a low volume). When you get into bed, should eliminate all technology (no texting, emailing, playing with your phone, iPad or tablet in bed). [x] HYDRATION - Maintain good hydration. Drink  2L of fluid a day (4 typical small water bottles)  [x] DIET - Maintain good nutrition. In particular don't skip meals and try and eat healthy balanced meals regularly. [x] TRIGGERS - Look for other triggers and avoid them: Limit caffeine to 1-2 cups a day or less. Avoid dietary triggers that you have noticed bring on your headaches (this could include aged cheese, peanuts, MSG, aspartame and nitrates). [x] EXERCISE - physical exercise as we all know is good for you in many ways, and not only is good for your heart, but also is beneficial for your mental health, cognitive health and  chronic pain/headaches. I would encourage at the least 5 days of physical exercise weekly for at least 30 minutes. Education and Follow-up  [x] Please call with any questions or concerns. Of course if any new concerning symptoms go to the emergency department. - Follow up 1 year, sooner if needed        CC: We had the pleasure of evaluating Courtney Rincon in neurological consultation today.  Courtney Rincon is a   right handed female who presents today for evaluation of headaches. History obtained from patient as well as available medical record review.   History of Present Illness:   Interval history as of 10/26/2023  - no significant new or concerning neurologic symptoms since last visit   - Had a breast excisional biopsy 6/1/2023 and everything turned out okay thankfully  - ENT follow up 9/19/23 and he was wondering when the major epistaxis   - did not go in for the sleep study, still having breathing issues and feels "ok" - I would still recommend it     Headaches and migraines   No cluster headaches since around 9/17/22    Preventative:   - Verapamil 120 mg at night  - emgality thrown away   - through other providers: euflexxa 1 shot weekly for 3 weeks for knee pain/arthritis every 6 months to a year,  meloxicam/mobic trying to not take it daily since shot helps    Abortive:   Eletriptan works   Denies bothersome side effects       Interval history as of 1/27/2023  - denies any new or concerning neurologic symptoms since last visit   - has not followed up with sleep yet     Headaches and migraines   No headaches since last visit     Preventative:   - Verapamil 120 mg at night, slowly weaned down over the past months and been here two months  - amitriptyline 50 mg to 25 mg a few months ago   - emgality has in fridge for if needed next time     Abortive:   Eletriptan works   Denies bothersome side effects       Interval history as of 9/22/2022  - denies any new or concerning neurologic symptoms since last visit    - max 3 hours in a row, wakes every hour to look at clock     Headaches and migraines   1 week no headache, then 9/17/22 was the last headache - multiple     Preventative:   verapamil 160/120/240 started 9/12/22 -no issues with blood pressure, blood pressure is perfect at this dose, denies constipation  amitriptyline 50 mg nightly  emgality 300 mg monthly  - did it twice and believes it helped and then nothing     Abortive: relpax - was taking daily for a while and stopped 9/12/22  Denies bothersome side effects     Interval history as of 9/1/2022  - denies any new or concerning neurologic symptoms since last visit     Headaches and migraines   - when she got off the computer with me two weeks ago got a headache, one day without, then slight headache, then last 3 nights has not had any  - she called 8/29/22 because her lymphedema was worse     Preventative:   amitriptyline 50 mg nightly  emgality 300 mg monthly    verapamil - 160/120/120    Abortive: eletriptan  Denies bothersome side effects    Interval history as of 8/18/2022  - denies any new or concerning neurologic symptoms since last visit   - sleep study ordered by Pulm 8/5/22 and scheduled for 1/20/23  - able to work through this, doesn't need work note     Headaches and migraines   stuck in her cluster headache cycle, but seems to be having some improvement    While taking the depakote on 1000 mg headaches were worse after 5 nights  Then started verapamil and then on day 5 and 6 took 120 mg BID and no headaches for 4 days, then 8/16/22 and 8/17/22 1 headache each day that improved with relpax     Preventative:    - next emgality 300 mg dose should be by 8/27/22  - trial of depakote 1-2 tabs nightly - made worse  - amitriptyline 50 mg nightly   - trial of re adding verapamil up to 120 mg BID helps     Blood pressure not dropping, no SE    Abortive:   - relpax /eletriptan   Denies bothersome side effects     Interval history as of 8/4/2022  - she was to follow up in a year and is now following up 1.5 years later due to return of headaches  - retiring at the end of the year and nervous about it     Headaches and migraines    - ran out of amitriptyline mid May due to pharmacy issue and missed one week and started to feel like she may have headaches again 5/18/22 but then didn't get them  - 7/24/22 msg reporting headaches for the past week, I received the message from staff 2 days later and called her and we added emgality 300 mg monthly for cluster  - 8/2/22 called reporting headaches continue after improving for 2-3 days, decadron added and on 3rd day   - still having headaches daily now, about 2 times a day, lasting about 20 minutes, not as bad as previous years, still crying at the end and trying not to since it makes it feel worse, pressure helps on the region  - right temporal and jaw and behind the eye and less so in the teeth, no vision changes    Preventative:   - emgality 300 mg monthly 7/27/22 - belly no issues   - amitriptyline 50 mg nightly - ran out mid May due to pharmacy issue and missed one week and started to have headaches again 5/18/22    Abortive:   - eletriptan/relpax  - hydrocodone for back sometimes - not taking   Denies bothersome side effects     -----------------   - seeing pulmonary tomorrow, up every 2 hours, snores   How likely a you to doze off or fall sleep during the following situations?   0 - would never doze  1 - slight chance of dozing  2 - moderate chance of dosing  3 - high chance of dozing    Leeds sleepiness scale - 11  Sitting and reading - 2  Watching TV - 3  Sitting, inactive in a public place such as a theater 0  As a passenger in a car for an hour without a break -0   Lying down to rest in afternoon when circumstances permit - 3  Sitting and talking to someone - 0  Sitting quietly after lunch without alcohol 3  In a car while stopped for few minutes in traffic - 0      Interval history as of 02/26/2021   - dx COVID last weds   - has not smoked in a year     She reports she has not had a significant headache or migraine in over a year   (she had these headaches about 10 years and thinks menopaused ended a few years ago, but we discussed they may have been hormonally related and maybe she is done with them now)  Preventative: Amitriptyline 50 mg nightly  Abortive:  Eletriptan     Interval history as of 9/11/2020:  - just had hip surgery in 8/2020  - forgot to take amitriptyline 50 mg for past week - will restart  - no headaches since we last talked         Interval history as of 5/29/2020:   -  MRI brain with without contrast 02/07/2020: No acute disease. Punctate pontine capillary telangiectasia. No orbital pathology. Headaches -none      She called in 03/10/2020 wanted to decrease Lyrica on verapamil, Amitriptyline-attempted to wean off but patient feel better on 25 mg nightly. Lyrica - weaned herself off  Verapamil - weaned herself off  Amitriptyline - 50 mg and doing great, sleeping wonderful 10-8      relpax not needed     Headaches started at what age? Late 25 years old  How often do the headaches occur?   - as of 1/24/2020: 3 months at a time headaches daily, none in 6 weeks   - As of 05/29/2020: no headaches since last time, weaned herself off of lyrica and verapamil and just on amitriptyline 50 mg nightly and doing well   What time of the day do the headaches start? only at night,  most of the time during sleep or before she sleeps at night  How long do the headaches last? 15 mins to an hour - 3 times at night and 2 times before bed - worsens until it gets to a point where it blows up and then goes away   Are you ever headache free? Yes     Aura? without aura     Where is your headache located and pain quality?   - starts with right teeth pain and feels it coming   - usually right frontal and right retroorbital - stabbing  - midfrontal lately - sharp, electtical  What is the intensity of pain?  Average: 15/10  Associated symptoms:   [] Nausea       [] Vomiting        [] Diarrhea  [x] Insomnia    [] Stiff or sore neck   [x] Problems with concentration  [x] Photophobia     [x]Phonophobia      [] Osmophobia  [] Blurred vision   [x] Prefer quiet, dark room  [] Light-headed or dizzy     [] Tinnitus    [] Hands or feet tingle or feel numb/paresthesias       [] Red ear      [] Ptosis      [] Facial droop  [x] Lacrimation - right tearing  [x] Nasal congestion  [] Flushing of face  [] Change in pupil size     Number of days missed per month because of headaches:  Work (or school) days: 0     Things that make the headache worse? No specific movements, any movement      Headache triggers:  Unknown   What time of the year do headaches occur more frequently?   are usually worse winter but has also had in July     Have you seen someone else for headaches or pain? Yes, Dr. Michele Henriquez   Have you had trigger point injection performed and how often? No  Have you had Botox injection performed and how often? No   Have you had epidural injections or transforaminal injections performed? No  Are you current pregnant or planning on getting pregnant? No   Have you ever had any Brain imaging? Yes multiple normal      What medications do you take or have you taken for your headaches?    ABORTIVE:    OTC medications have been ineffective         Relpax - 12/month - if takes before bed able to sleep for 4 hours -      Has norco for back once a year when back goes out      Past:  Steroids helped in the past, but last couple bathes lasted a day only  Sumatriptan PO and nasal helped for a little   Rizatriptan - was helpful and too expensive      PREVENTIVE:      - amitriptyline 25 mg - been on for years - up to 50 in cycle   For hip pain: meloxicam      Past:  - Verapamil 240 mg daily (in past 240 +120)   - pregabalin 75 mg daily- started 1 week before being 6 weeks headache free        Past/failed:   carbamazepine - did not help  depakote did not help   topiramate 100 mg over 3 months   Gabapentin helped in the past and not recently  Metoprolol        LIFESTYLE  Sleep   - averages: normally sleeps well     Physical activity: none      Water: 0 per day  Caffeine: 8 cups per day  Diet:  overeating     Mood:   Denies history of anxiety or depression or other diagnosed mood disorder     The following portions of the patient's history were reviewed and updated as appropriate: allergies, current medications, past family history, past medical history, past social history, past surgical history and problem list.     Pertinent family history:  Family history of headaches:  Migraines in daughter, sister and mother  Any family history of aneurysms - No     Pertinent social history:  Work: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  Education: 12th   Lives with      Illicit Drugs: denies  Alcohol/tobacco: quiting tobacco - 5 cigs improvement, alcohol no     Past Medical History:   Diagnosis Date    Asymptomatic postmenopausal status     Back pain, lumbosacral     Biceps tendinitis 3/23/2022    Cellulitis of left lower extremity     last assessed. ... 3/30/16    resolved. Iline Hari .. 9/8/16      Cellulitis of lower extremity 03/23/2022    Cluster headaches     Colon polyps     CTS (carpal tunnel syndrome) 3 years ago    Dense breasts     Foot pain, left     Head injury     Dec 2017 no LOC    Hip pain, right     Lumbosacral pain     Lymph edema     Migraine 6 weeks   cluster headaches    Nosebleed     Right hip pain     Spider veins     Tendinitis of right shoulder     Varicose veins of legs        Past Surgical History:   Procedure Laterality Date    BACK SURGERY      type unknown- occurred during vaginal delivery    BREAST BIOPSY Left     HAND TENDON SURGERY      OR NEUROPLASTY &/TRANSPOS MEDIAN NRV CARPAL TUNNE Right 09/12/2016    Procedure: WRIST OPEN CARPAL TUNNEL RELEASE ;  Surgeon: Rajendra Bedoya MD;  Location: [facility];  Service: Orthopedics    SINUS SURGERY      TOTAL HIP ARTHROPLASTY Right 08/24/2020    TUBAL LIGATION      VASCULAR SURGERY  various dates    VEIN LIGATION AND STRIPPING Left        Current Outpatient Medications   Medication Sig Dispense Refill    albuterol (Ventolin HFA) 90 mcg/act inhaler Inhale 2 puffs every 6 (six) hours as needed for wheezing 54 g 3    eletriptan (RELPAX) 40 MG tablet 1 tab po at migraine onset, may repeat in 2 hours if needed. Max 2/24 hours, 18 per 2 month.  18 tablet 6    Euflexxa 20 MG/2ML SOSY       Galcanezumab-gnlm (Emgality, 300 MG Dose,) 100 MG/ML SOSY Inject 3 mL (300 mg total) under the skin every 30 (thirty) days Until cluster cycle stops 3 mL 3    ibuprofen (MOTRIN) 800 mg tablet TAKE 1 TABLET BY MOUTH EVERY 6 HOURS AS NEEDED FOR MILD PAIN (PAIN SCORE 1-3). meloxicam (MOBIC) 15 mg tablet Take 15 mg by mouth daily      verapamil (CALAN-SR) 120 mg CR tablet Take 1 tablet (120 mg total) by mouth daily at bedtime 90 tablet 2     No current facility-administered medications for this visit. No Known Allergies      Objective:     Exam limited by Video  Physical Exam:                                                                 Vitals:            Constitutional:    LMP 01/01/2015   BP Readings from Last 3 Encounters:   07/12/23 110/60   05/17/23 112/70   03/29/23 116/82     Pulse Readings from Last 3 Encounters:   07/12/23 78   05/17/23 93   01/27/23 86         Well developed, well nourished, No dysmorphic features. HEENT:  Normocephalic atraumatic. See neuro exam   Psychiatric:  Normal behavior and appropriate affect        Neurological Examination:     Mental status/cognitive function:   Recent and remote memory appear intact. Attention span and concentration as well as fund of knowledge appear appropriate for age. Normal language and spontaneous speech. Cranial Nerves:  VII-facial expression symmetric  Motor Exam: symmetric bulk throughout. no atrophy, fasciculations or abnormal movements noted. Coordination:  no apparent dysmetria, ataxia or tremor noted      Pertinent lab results:   See EMR for recent labs  06/21/2022 TSH and free T4 normal    CMP and CBC unremarkable except for slightly elevated alkaline phosphatase  01/28/2020 CMP significant for sodium 147, B12 260    10/29/2019 CMP and CBC unremarkable, TSH normal     06/08/2016 B12 283     Imaging:   -MRI brain with and without contrast 2/7/2020:  Per radiology read no acute disease.   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 No orbital pathology. *As of my retrospective review 10/26/2023 she has partially empty sella although only slightly so, prominent optic nerve sheath bilaterally and optic nerve tortuosity, cerebellar tonsils overlie the foramen magnum with pointed tip with tight junction bilaterally without Chiari officially     MRA head 01/21/2015:  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. No evidence of intracranial aneurysm, AVM, cortical cerebral arterial stenosis or occlusion  - follow up CT scan was normal, no cyst      Review of Systems:   ROS obtained by medical assistant and personally reviewed, but if any symptoms listed below say negative, does not mean patient has not had this symptom since last visit, please see HPI for details of symptoms discussed this visit. I recommended PCP follow up for non neurologic problems. Review of Systems   Constitutional:  Negative for appetite change, fatigue and fever. HENT: Negative. Negative for hearing loss, tinnitus, trouble swallowing and voice change. Eyes: Negative. Negative for photophobia, pain and visual disturbance. Respiratory: Negative. Negative for shortness of breath. Cardiovascular: Negative. Negative for palpitations. Gastrointestinal: Negative. Negative for nausea and vomiting. Endocrine: Negative. Negative for cold intolerance. Genitourinary: Negative. Negative for dysuria, frequency and urgency. Musculoskeletal:  Negative for back pain, gait problem, myalgias and neck pain. Skin: Negative. Negative for rash. Allergic/Immunologic: Negative. Neurological: Negative. Negative for dizziness, tremors, seizures, syncope, facial asymmetry, speech difficulty, weakness, light-headedness, numbness and headaches. Hematological: Negative. Does not bruise/bleed easily. Psychiatric/Behavioral: Negative.   Negative for confusion, hallucinations and sleep disturbance I have spent 21 minutes with Patient today in which greater than 50% of this time was spent in counseling/coordination of care regarding Prognosis, Risks and benefits of tx options, Instructions for management, Patient education, Importance of tx compliance, Risk factor reductions, Impressions, Counseling / Coordination of care, Documenting in the medical record, Reviewing / ordering tests, medicine, procedures  , and Obtaining or reviewing history  . I also spent 10 minutes non face to face for this patient the same day.            Visit Time  Total Visit Duration: 31

## 2023-10-31 ENCOUNTER — OFFICE VISIT (OUTPATIENT)
Age: 58
End: 2023-10-31
Payer: COMMERCIAL

## 2023-10-31 VITALS
HEIGHT: 71 IN | SYSTOLIC BLOOD PRESSURE: 140 MMHG | WEIGHT: 290 LBS | DIASTOLIC BLOOD PRESSURE: 82 MMHG | HEART RATE: 102 BPM | BODY MASS INDEX: 40.6 KG/M2 | OXYGEN SATURATION: 98 %

## 2023-10-31 DIAGNOSIS — E66.01 CLASS 3 SEVERE OBESITY DUE TO EXCESS CALORIES WITHOUT SERIOUS COMORBIDITY WITH BODY MASS INDEX (BMI) OF 40.0 TO 44.9 IN ADULT (HCC): ICD-10-CM

## 2023-10-31 DIAGNOSIS — Z12.11 ENCOUNTER FOR SCREENING COLONOSCOPY: Primary | ICD-10-CM

## 2023-10-31 DIAGNOSIS — I10 PRIMARY HYPERTENSION: ICD-10-CM

## 2023-10-31 DIAGNOSIS — Z86.010 HX OF COLONIC POLYPS: Primary | ICD-10-CM

## 2023-10-31 PROCEDURE — 99203 OFFICE O/P NEW LOW 30 MIN: CPT | Performed by: COLON & RECTAL SURGERY

## 2023-10-31 NOTE — PROGRESS NOTES
Assessment/Plan:  Patient is a 55-year-old woman with history of recurrent multiple adenomatous colonic polyps due for surveillance colonoscopy. Plan:    Surveillance colonoscopy     Diagnoses and all orders for this visit:    Hx of colonic polyps  -     Colonoscopy; Future    Class 3 severe obesity due to excess calories without serious comorbidity with body mass index (BMI) of 40.0 to 44.9 in adult [facility])    Primary hypertension    Other orders  -     Diet NPO; Sips with meds; Standing  -     Void on call to OR; Standing  -     Insert peripheral IV; Standing          Subjective:      Patient ID: Jerry Weinstein is a 59 y.o. female. 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 Patient with history of adenomatous polyps last colonoscopy 2020. The following portions of the patient's history were reviewed and updated as appropriate: allergies, current medications, past family history, past medical history, past social history, past surgical history, and problem list.    Review of Systems   Constitutional:  Negative for chills and fever. HENT:  Negative for ear pain and sore throat. Eyes:  Negative for pain and visual disturbance. Respiratory:  Negative for cough and shortness of breath. Cardiovascular:  Negative for chest pain and palpitations. Gastrointestinal:  Negative for abdominal pain and vomiting. Genitourinary:  Negative for dysuria and hematuria. Musculoskeletal:  Positive for gait problem. Negative for arthralgias and back pain. Skin:  Negative for color change and rash. Neurological:  Negative for seizures and syncope. All other systems reviewed and are negative. Objective:      /82   Pulse 102   Ht 5' 11" (1.803 m)   Wt 132 kg (290 lb)   LMP 01/01/2015   SpO2 98%   BMI 40.45 kg/m²          Physical Exam  Vitals and nursing note reviewed. Constitutional:       Appearance: Normal appearance.  She is obese.   HENT:      Head: Normocephalic and atraumatic. Eyes:      Conjunctiva/sclera: Conjunctivae normal.   Cardiovascular:      Rate and Rhythm: Normal rate and regular rhythm. Heart sounds: Normal heart sounds. Pulmonary:      Effort: Pulmonary effort is normal.      Breath sounds: Normal breath sounds. Abdominal:      General: Bowel sounds are normal.      Palpations: Abdomen is soft. Musculoskeletal:      Cervical back: Neck supple. Skin:     General: Skin is warm and dry. Neurological:      Mental Status: She is oriented to person, place, and time.    Psychiatric:         Mood and Affect: Mood normal.         Behavior: Behavior normal.

## 2023-10-31 NOTE — PATIENT INSTRUCTIONS
Scheduled date of colonoscopy (as of today): 12/11/23  Physician performing colonoscopy: Shawn  Location of colonoscopy: [facility]  Bowel prep reviewed with patient: Golytely  Instructions reviewed with patient by: Francisco AGUILERA  Clearances:

## 2023-11-13 ENCOUNTER — OFFICE VISIT (OUTPATIENT)
Age: 58
End: 2023-11-13
Payer: COMMERCIAL

## 2023-11-13 VITALS
HEART RATE: 88 BPM | WEIGHT: 289 LBS | TEMPERATURE: 97.8 F | BODY MASS INDEX: 40.46 KG/M2 | DIASTOLIC BLOOD PRESSURE: 80 MMHG | SYSTOLIC BLOOD PRESSURE: 130 MMHG | OXYGEN SATURATION: 97 % | RESPIRATION RATE: 18 BRPM | HEIGHT: 71 IN

## 2023-11-13 DIAGNOSIS — J43.2 CENTRILOBULAR EMPHYSEMA (HCC): ICD-10-CM

## 2023-11-13 DIAGNOSIS — R06.09 DYSPNEA ON EXERTION: Primary | ICD-10-CM

## 2023-11-13 DIAGNOSIS — F17.211 CIGARETTE NICOTINE DEPENDENCE IN REMISSION: ICD-10-CM

## 2023-11-13 PROCEDURE — 99214 OFFICE O/P EST MOD 30 MIN: CPT | Performed by: PHYSICIAN ASSISTANT

## 2023-11-13 RX ORDER — BUDESONIDE AND FORMOTEROL FUMARATE DIHYDRATE 160; 4.5 UG/1; UG/1
2 AEROSOL RESPIRATORY (INHALATION) 2 TIMES DAILY
Qty: 40.8 G | Refills: 3 | Status: SHIPPED | OUTPATIENT
Start: 2023-11-13

## 2023-11-13 NOTE — PROGRESS NOTES
Assessment/Plan:   Diagnoses and all orders for this visit:    Dyspnea on exertion    Centrilobular emphysema (HCC)  -     budesonide-formoterol (Symbicort) 160-4.5 mcg/act inhaler; Inhale 2 puffs 2 (two) times a day Rinse mouth after use. Cigarette nicotine dependence in remission  -     CT lung screening program; Future    Patient is here today for follow up. She is still having some SOB/MCKENZIE which is relieved for a short time with the albuterol. Symptoms likely related to COPD/emphysema, reactive airway - has been having trouble since having COVID. She had been on Breo but this was too expensive. Would benefit from long-acting bronchodilators given the emphysema on scan as well as her symptoms and relief with the albuterol. We will trial her on Symbicort instead as this appears to be on her formulary. She had a high-resolution CT scan done last year which did not show any evidence of interstitial lung disease, restriction on PFT likely related to obesity. She is a candidate for CT lung screening which will be ordered today. She will follow-up with us in about 4 months or sooner if necessary. Return in about 4 months (around 3/13/2024). All questions are answered to the patient's satisfaction and understanding. She verbalizes understanding. She is encouraged to call with any further questions or concerns. Portions of the record may have been created with voice recognition software. Occasional wrong word or "sound a like" substitutions may have occurred due to the inherent limitations of voice recognition software. Read the chart carefully and recognize, using context, where substitutions have occurred.     Electronically Signed by Sanjana Wheeler PA-C    ______________________________________________________________________    Chief Complaint:   Chief Complaint   Patient presents with    Follow-up       Patient ID: Junior Baker is a 59 y.o. y.o. female has a past medical history of Asymptomatic postmenopausal status, Back pain, lumbosacral, Biceps tendinitis (3/23/2022), Cellulitis of left lower extremity, Cellulitis of lower extremity (03/23/2022), Cluster headaches, Colon polyps, CTS (carpal tunnel syndrome) (3 years ago), Dense breasts, Foot pain, left, Head injury, Hip pain, right, Lumbosacral pain, Lymph edema, Migraine (6 weeks   cluster headaches), Nosebleed, Right hip pain, Spider veins, Tendinitis of right shoulder, and Varicose veins of legs. 11/13/2023  Patient presents today for follow-up visit. Patient is a 68-year-old female former smoker who quit in 2020 with past medical history of COVID in February 2021, COPD/emphysema, cluster headaches. She is here today for follow-up. She continues to have some ongoing shortness of breath/dyspnea on exertion. She did use the [facility] for couple of months although it was becoming too expensive. Currently she is using albuterol as needed, does note some short-term relief with the albuterol. She did not have the sleep study done that was ordered for her. primary symptoms  Pertinent negatives include no chest pain, fever, headaches, myalgias or sore throat. Review of Systems   Constitutional: Negative. Negative for appetite change and fever. HENT: Negative. Negative for ear pain, postnasal drip, rhinorrhea, sneezing, sore throat and trouble swallowing. Respiratory:  Positive for shortness of breath. Cardiovascular: Negative. Negative for chest pain. Gastrointestinal: Negative. Genitourinary: Negative. Musculoskeletal: Negative. Negative for myalgias. Skin: Negative. Allergic/Immunologic: Negative. Neurological: Negative. Negative for headaches. Psychiatric/Behavioral: Negative. Smoking history: She reports that she quit smoking about 3 years ago. Her smoking use included cigarettes. She started smoking about 47 years ago. She has a 30.00 pack-year smoking history.  She has never used smokeless tobacco.    The following portions of the patient's history were reviewed and updated as appropriate: allergies, current medications, past family history, past medical history, past social history, past surgical history, and problem list.    Immunization History   Administered Date(s) Administered    COVID-19 PFIZER VACCINE 0.3 ML IM 05/23/2021, 06/13/2021    INFLUENZA 01/25/2023    Influenza, recombinant, quadrivalent,injectable, preservative free 10/23/2019, 11/06/2020, 01/25/2023    Tdap 03/30/2016     Current Outpatient Medications   Medication Sig Dispense Refill    albuterol (Ventolin HFA) 90 mcg/act inhaler Inhale 2 puffs every 6 (six) hours as needed for wheezing 54 g 3    budesonide-formoterol (Symbicort) 160-4.5 mcg/act inhaler Inhale 2 puffs 2 (two) times a day Rinse mouth after use. 40.8 g 3    eletriptan (RELPAX) 40 MG tablet 1 tab po at migraine onset, may repeat in 2 hours if needed. Max 2/24 hours, 27 per 3 month. 27 tablet 3    Euflexxa 20 MG/2ML SOSY if needed      ibuprofen (MOTRIN) 800 mg tablet TAKE 1 TABLET BY MOUTH EVERY 6 HOURS AS NEEDED FOR MILD PAIN (PAIN SCORE 1-3). meloxicam (MOBIC) 15 mg tablet Take 15 mg by mouth daily      verapamil (CALAN-SR) 120 mg CR tablet Take 1 tablet (120 mg total) by mouth daily at bedtime 90 tablet 4    polyethylene glycol (GOLYTELY) 4000 mL solution Take 4,000 mL by mouth once for 1 dose 4000 mL 0     No current facility-administered medications for this visit. Allergies: Patient has no known allergies. Objective:  Vitals:    11/13/23 1458 11/13/23 1459   BP: 130/80    BP Location: Left arm    Patient Position: Sitting    Cuff Size: Large    Pulse: 88    Resp: 18    Temp: 97.8 °F (36.6 °C)    SpO2: 97% 97%   Weight: 131 kg (289 lb)    Height: 5' 11" (1.803 m)    Oxygen Therapy  SpO2: 97 %  Oxygen Therapy: None (Room air)  .   Wt Readings from Last 3 Encounters:   11/13/23 131 kg (289 lb)   10/31/23 132 kg (290 lb)   09/19/23 131 kg (289 lb) Body mass index is 40.31 kg/m². Physical Exam  Constitutional:       General: She is not in acute distress. Appearance: Normal appearance. She is well-developed. She is not ill-appearing. HENT:      Head: Normocephalic and atraumatic. Mouth/Throat:      Pharynx: Oropharynx is clear. Eyes:      Pupils: Pupils are equal, round, and reactive to light. Cardiovascular:      Rate and Rhythm: Normal rate and regular rhythm. Pulmonary:      Effort: Pulmonary effort is normal. No respiratory distress. Breath sounds: Normal breath sounds. No decreased breath sounds, wheezing, rhonchi or rales. Abdominal:      General: Abdomen is flat. There is no distension. Musculoskeletal:         General: Normal range of motion. Cervical back: Normal range of motion. Right lower leg: No edema. Left lower leg: No edema. Skin:     General: Skin is warm and dry. Findings: No rash. Neurological:      Mental Status: She is alert and oriented to person, place, and time. Psychiatric:         Mood and Affect: Mood normal.         Behavior: Behavior normal.         Lab Review:   Lab Results   Component Value Date     08/31/2016    K 4.4 06/21/2022     06/21/2022    CO2 24 06/21/2022    BUN 13 06/21/2022    CREATININE 0.67 06/21/2022    CREATININE 0.75 02/28/2021    CREATININE 0.55 (L) 08/31/2016    GLUCOSE 90 07/06/2017    CALCIUM 9.2 02/28/2021    CALCIUM 9.2 08/31/2016     Lab Results   Component Value Date    WBC 4.3 06/21/2022    WBC 6.11 02/28/2021    WBC 6.4 08/31/2016    HGB 12.5 06/21/2022    HGB 11.0 (L) 04/27/2021    HGB 13.2 08/31/2016    HCT 38.9 06/21/2022    HCT 35.2 04/27/2021    HCT 39.5 08/31/2016    MCV 90 06/21/2022    MCV 92 02/28/2021    MCV 90 08/31/2016     06/21/2022     02/28/2021     08/31/2016       Diagnostics:  I have personally reviewed pertinent reports.     Reviewed prior CT scan  Office Spirometry Results:     ESS:    No results found.   Answers submitted by the patient for this visit:  Pulmonology Questionnaire (Submitted on 11/6/2023)  Chief Complaint: Primary symptoms  Do you have shortness of breath?: Yes  Chronicity: recurrent  When did you first notice your symptoms?: more than 1 year ago  How often do your symptoms occur?: daily  Since you first noticed this problem, how has it changed?: gradually worsening  Have you had a change in appetite?: No  Do you have chest pain?: No  Do you have shortness of breath that occurs with effort or exertion?: Yes  Do you have ear congestion?: No  Do you have ear pain?: No  Do you have a fever?: No  Do you have headaches?: No  Do you have heartburn?: No  Do you have fatigue?: No  Do you have muscle pain?: No  Do you have nasal congestion?: No  Do you have shortness of breath when lying flat?: No  Do you have shortness of breath when you wake up?: Yes  Do you have post-nasal drip?: No  Do you have a runny nose?: No  Do you have sneezing?: No  Do you have a sore throat?: No  Do you have sweats?: No  Do you have trouble swallowing?: No  Have you experienced weight loss?: No  Which of the following makes your symptoms worse?: any activity, climbing stairs  Which of the following makes your symptoms better?: nothing

## 2023-12-04 ENCOUNTER — TELEPHONE (OUTPATIENT)
Age: 58
End: 2023-12-04

## 2023-12-04 NOTE — TELEPHONE ENCOUNTER
I spoke to patient This is [address] [facility] confirming your upcoming Colonoscopy/EGD for 12/11/23 with Dr Pollo Miller. Please keep in mind you will receive a phone call the day prior with your exact arrival time. Now is a good time to review your prep instructions. If you have any questions regarding the diet or prep instructions, please contact the office @ [phone]. Please reply "Yes" to confirm. To reschedule, please call the office at [phone].

## 2023-12-11 ENCOUNTER — ANESTHESIA (OUTPATIENT)
Dept: GASTROENTEROLOGY | Facility: HOSPITAL | Age: 58
End: 2023-12-11

## 2023-12-11 ENCOUNTER — ANESTHESIA EVENT (OUTPATIENT)
Dept: GASTROENTEROLOGY | Facility: HOSPITAL | Age: 58
End: 2023-12-11

## 2023-12-11 ENCOUNTER — HOSPITAL ENCOUNTER (OUTPATIENT)
Dept: GASTROENTEROLOGY | Facility: HOSPITAL | Age: 58
Setting detail: OUTPATIENT SURGERY
Discharge: HOME | End: 2023-12-11
Attending: COLON & RECTAL SURGERY
Payer: COMMERCIAL

## 2023-12-11 VITALS
HEIGHT: 71 IN | SYSTOLIC BLOOD PRESSURE: 126 MMHG | HEART RATE: 74 BPM | DIASTOLIC BLOOD PRESSURE: 73 MMHG | TEMPERATURE: 97.5 F | BODY MASS INDEX: 40.15 KG/M2 | WEIGHT: 286.82 LBS | RESPIRATION RATE: 22 BRPM | OXYGEN SATURATION: 97 %

## 2023-12-11 DIAGNOSIS — Z86.010 HX OF COLONIC POLYPS: ICD-10-CM

## 2023-12-11 PROCEDURE — 45380 COLONOSCOPY AND BIOPSY: CPT | Performed by: COLON & RECTAL SURGERY

## 2023-12-11 PROCEDURE — 45385 COLONOSCOPY W/LESION REMOVAL: CPT | Performed by: COLON & RECTAL SURGERY

## 2023-12-11 PROCEDURE — 88305 TISSUE EXAM BY PATHOLOGIST: CPT | Performed by: PATHOLOGY

## 2023-12-11 RX ORDER — ONDANSETRON 2 MG/ML
4 INJECTION INTRAMUSCULAR; INTRAVENOUS ONCE AS NEEDED
Status: CANCELLED | OUTPATIENT
Start: 2023-12-11

## 2023-12-11 RX ORDER — SODIUM CHLORIDE, SODIUM LACTATE, POTASSIUM CHLORIDE, CALCIUM CHLORIDE 600; 310; 30; 20 MG/100ML; MG/100ML; MG/100ML; MG/100ML
INJECTION, SOLUTION INTRAVENOUS CONTINUOUS PRN
Status: DISCONTINUED | OUTPATIENT
Start: 2023-12-11 | End: 2023-12-11

## 2023-12-11 RX ORDER — LIDOCAINE HYDROCHLORIDE 20 MG/ML
INJECTION, SOLUTION EPIDURAL; INFILTRATION; INTRACAUDAL; PERINEURAL AS NEEDED
Status: DISCONTINUED | OUTPATIENT
Start: 2023-12-11 | End: 2023-12-11

## 2023-12-11 RX ORDER — PROPOFOL 10 MG/ML
INJECTION, EMULSION INTRAVENOUS AS NEEDED
Status: DISCONTINUED | OUTPATIENT
Start: 2023-12-11 | End: 2023-12-11

## 2023-12-11 RX ADMIN — PROPOFOL 50 MG: 10 INJECTION, EMULSION INTRAVENOUS at 10:00

## 2023-12-11 RX ADMIN — PROPOFOL 50 MG: 10 INJECTION, EMULSION INTRAVENOUS at 10:16

## 2023-12-11 RX ADMIN — PROPOFOL 30 MG: 10 INJECTION, EMULSION INTRAVENOUS at 10:14

## 2023-12-11 RX ADMIN — SODIUM CHLORIDE, SODIUM LACTATE, POTASSIUM CHLORIDE, AND CALCIUM CHLORIDE: .6; .31; .03; .02 INJECTION, SOLUTION INTRAVENOUS at 09:53

## 2023-12-11 RX ADMIN — PROPOFOL 50 MG: 10 INJECTION, EMULSION INTRAVENOUS at 10:01

## 2023-12-11 RX ADMIN — PROPOFOL 50 MG: 10 INJECTION, EMULSION INTRAVENOUS at 10:20

## 2023-12-11 RX ADMIN — PROPOFOL 50 MG: 10 INJECTION, EMULSION INTRAVENOUS at 10:12

## 2023-12-11 RX ADMIN — LIDOCAINE HYDROCHLORIDE 100 MG: 20 INJECTION, SOLUTION EPIDURAL; INFILTRATION; INTRACAUDAL; PERINEURAL at 09:58

## 2023-12-11 RX ADMIN — PROPOFOL 50 MG: 10 INJECTION, EMULSION INTRAVENOUS at 10:08

## 2023-12-11 RX ADMIN — PROPOFOL 50 MG: 10 INJECTION, EMULSION INTRAVENOUS at 10:04

## 2023-12-11 RX ADMIN — PROPOFOL 100 MG: 10 INJECTION, EMULSION INTRAVENOUS at 09:58

## 2023-12-11 NOTE — ANESTHESIA PREPROCEDURE EVALUATION
Procedure:  COLONOSCOPY    Relevant Problems   CARDIO   (+) Dyspnea on exertion      MUSCULOSKELETAL   (+) Back pain, lumbosacral   (+) Degenerative joint disease of right hip   (+) Osteoarthritis of acromioclavicular joint      NEURO/PSYCH   (+) Cluster headaches      PULMONARY   (+) Dyspnea on exertion     Abnormal resting ECG findings   Arthralgia of right knee   Asymptomatic postmenopausal status   Back pain, lumbosacral   Bleeding from varicose veins of lower extremity   Carpal tunnel syndrome   Chronic peripheral venous hypertension   Chronic venous insufficiency   Cluster headaches   Degenerative joint disease of right hip   Dyspnea on exertion   Lymphedema   Osteoarthritis of acromioclavicular joint   Radial styloid tenosynovitis   Spider angioma   Status post hip surgery   Thrombophlebitis of superficial veins of right lower extremity   Varicose veins of legs        Physical Exam    Airway    Mallampati score: III  TM Distance: >3 FB  Neck ROM: full     Dental       Cardiovascular  Cardiovascular exam normal    Pulmonary  Pulmonary exam normal     Other Findings  post-pubertal.      Anesthesia Plan  ASA Score- 2     Anesthesia Type- IV sedation with anesthesia with ASA Monitors. Additional Monitors:     Airway Plan:            Plan Factors-Exercise tolerance (METS): >4 METS. Chart reviewed. Existing labs reviewed. Patient summary reviewed. Patient is not a current smoker. Induction- intravenous. Postoperative Plan-     Informed Consent- Anesthetic plan and risks discussed with patient. I personally reviewed this patient with the CRNA. Discussed and agreed on the Anesthesia Plan with the CRNA. Jessica Sanders

## 2023-12-11 NOTE — ANESTHESIA POSTPROCEDURE EVALUATION
Post-Op Assessment Note    CV Status:  Stable  Pain Score: 0    Pain management: adequate       Mental Status:  Awake   Hydration Status:  Euvolemic   PONV Controlled:  Controlled   Airway Patency:  Patent     Post Op Vitals Reviewed: Yes      Staff: CRNA             113/72  BP   113/72   Temp  36   Pulse  82   Resp   14   SpO2   100

## 2023-12-11 NOTE — H&P
History and Physical - [facility]  Nena Irvin 59 y.o. female MRN: [id number]                  HPI: Nena Irvin is a 59y.o. year old female who presents for hx colon polyps       REVIEW OF SYSTEMS: Per the HPI, and otherwise unremarkable. Historical Information   Past Medical History:   Diagnosis Date    Asymptomatic postmenopausal status     Back pain, lumbosacral     Biceps tendinitis 3/23/2022    Cellulitis of left lower extremity     last assessed. ... 3/30/16    resolved. Jay Negus .. 9/8/16      Cellulitis of lower extremity 03/23/2022    Cluster headaches     Colon polyps     CTS (carpal tunnel syndrome) 3 years ago    Dense breasts     Foot pain, left     Head injury     Dec 2017 no LOC    Hip pain, right     Lumbosacral pain     Lymph edema     Migraine 6 weeks   cluster headaches    Nosebleed     Right hip pain     Spider veins     Tendinitis of right shoulder     Varicose veins of legs      Past Surgical History:   Procedure Laterality Date    BACK SURGERY  1990    type unknown- occurred during vaginal delivery    BREAST BIOPSY Left 2019    COLONOSCOPY  12/18/2019    HAND TENDON SURGERY      JOINT REPLACEMENT  hip  8/20    IL NEUROPLASTY &/TRANSPOS MEDIAN NRV CARPAL TUNNE Right 09/12/2016    Procedure: WRIST OPEN CARPAL TUNNEL RELEASE ;  Surgeon: Graham Cervantes MD;  Location: [facility];  Service: Orthopedics    SINUS SURGERY      TOTAL HIP ARTHROPLASTY Right 08/24/2020    TUBAL LIGATION      VASCULAR SURGERY  various dates    VEIN LIGATION AND STRIPPING Left 1987     Social History   Social History     Substance and Sexual Activity   Alcohol Use Not Currently    Comment: rare     Social History     Substance and Sexual Activity   Drug Use No     Social History     Tobacco Use   Smoking Status Former    Packs/day: 1.00    Years: 30.00    Total pack years: 30.00    Types: Cigarettes    Start date: 1/1/1976    Quit date: 2/15/2020    Years since quitting: 3.8   Smokeless Tobacco Never Tobacco Comments    started smoking at 13years old     Family History   Problem Relation Age of Onset    Cancer Mother         cervical    Sudden death Mother         MVA hit by a drunk     Varicose Veins Father     Heart disease Father         cardiac disorder    Heart attack Father     Cancer Sister         appendex    Cancer Sister         Appendix    Cancer Sister         breast    Migraines Daughter     Other Maternal Grandmother         gyn problem    Cervical cancer Maternal Grandmother     Breast cancer Maternal Aunt         neoplasm    Colon cancer Neg Hx     Ovarian cancer Neg Hx     Uterine cancer Neg Hx        Meds/Allergies     (Not in a hospital admission)      No Known Allergies    Objective     Blood pressure 124/61, pulse 84, temperature (!) 97.3 °F (36.3 °C), temperature source Temporal, resp. rate 20, height 5' 11" (1.803 m), weight 130 kg (286 lb 13.1 oz), last menstrual period 01/01/2015, SpO2 95 %, not currently breastfeeding.       PHYSICAL EXAM    Gen: NAD  CV: RRR  CHEST: Clear  ABD: soft, NT/ND  EXT: no edema  Neuro: AAO      ASSESSMENT/PLAN:  This is a 59y.o. year old female here for hx colon polyps     PLAN:   Procedure: colonoscopy

## 2023-12-11 NOTE — INTERVAL H&P NOTE
H&P reviewed. After examining the patient I find no changes in the patients condition since the H&P had been written.     Vitals:    12/11/23 0903   BP: 124/61   Pulse: 84   Resp: 20   Temp: (!) 97.3 °F (36.3 °C)   SpO2: 95%

## 2023-12-13 ENCOUNTER — HOSPITAL ENCOUNTER (OUTPATIENT)
Dept: CT IMAGING | Facility: HOSPITAL | Age: 58
Discharge: HOME | End: 2023-12-13
Payer: COMMERCIAL

## 2023-12-13 DIAGNOSIS — F17.211 CIGARETTE NICOTINE DEPENDENCE IN REMISSION: ICD-10-CM

## 2023-12-13 PROCEDURE — 71271 CT THORAX LUNG CANCER SCR C-: CPT

## 2023-12-14 PROCEDURE — 88305 TISSUE EXAM BY PATHOLOGIST: CPT | Performed by: PATHOLOGY

## 2024-01-10 ENCOUNTER — TELEPHONE (OUTPATIENT)
Age: 59
End: 2024-01-10

## 2024-01-10 NOTE — TELEPHONE ENCOUNTER
Spoke with pt in reference to scheduling appt for February fu as recommended   pt states she will call back when she is ready to schedule

## 2024-01-13 ENCOUNTER — HOSPITAL ENCOUNTER (EMERGENCY)
Facility: HOSPITAL | Age: 59
Discharge: HOME | End: 2024-01-14
Attending: EMERGENCY MEDICINE
Payer: COMMERCIAL

## 2024-01-13 ENCOUNTER — APPOINTMENT (EMERGENCY)
Dept: RADIOLOGY | Facility: HOSPITAL | Age: 59
End: 2024-01-13
Payer: COMMERCIAL

## 2024-01-13 VITALS
OXYGEN SATURATION: 97 % | RESPIRATION RATE: 22 BRPM | TEMPERATURE: 97.9 F | SYSTOLIC BLOOD PRESSURE: 132 MMHG | DIASTOLIC BLOOD PRESSURE: 65 MMHG | HEART RATE: 98 BPM

## 2024-01-13 DIAGNOSIS — S99.929A FOOT INJURY: Primary | ICD-10-CM

## 2024-01-13 PROCEDURE — 99284 EMERGENCY DEPT VISIT MOD MDM: CPT

## 2024-01-13 PROCEDURE — 73630 X-RAY EXAM OF FOOT: CPT

## 2024-01-13 PROCEDURE — 99283 EMERGENCY DEPT VISIT LOW MDM: CPT

## 2024-01-13 RX ORDER — OXYCODONE HYDROCHLORIDE 5 MG/1
5 TABLET ORAL ONCE
Status: COMPLETED | OUTPATIENT
Start: 2024-01-13 | End: 2024-01-13

## 2024-01-13 RX ADMIN — OXYCODONE HYDROCHLORIDE 5 MG: 5 TABLET ORAL at 23:43

## 2024-01-14 RX ORDER — CEPHALEXIN 500 MG/1
500 CAPSULE ORAL 4 TIMES DAILY
Qty: 20 CAPSULE | Refills: 0 | Status: SHIPPED | OUTPATIENT
Start: 2024-01-14 | End: 2024-01-19

## 2024-01-14 NOTE — DISCHARGE INSTRUCTIONS
Follow-up with your podiatrist.  RICE.  Take NSAIDs as needed for pain and swelling.  If any symptoms worsen or new symptoms appear return to the ER.
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
## 2024-04-03 ENCOUNTER — OFFICE VISIT (OUTPATIENT)
Dept: INTERNAL MEDICINE CLINIC | Facility: CLINIC | Age: 59
End: 2024-04-03
Payer: COMMERCIAL

## 2024-04-03 VITALS
HEART RATE: 71 BPM | OXYGEN SATURATION: 96 % | DIASTOLIC BLOOD PRESSURE: 68 MMHG | HEIGHT: 71 IN | BODY MASS INDEX: 40.04 KG/M2 | WEIGHT: 286 LBS | SYSTOLIC BLOOD PRESSURE: 124 MMHG

## 2024-04-03 DIAGNOSIS — B07.9 VERRUCA: Primary | ICD-10-CM

## 2024-04-03 PROCEDURE — 17110 DESTRUCTION B9 LES UP TO 14: CPT | Performed by: PHYSICIAN ASSISTANT

## 2024-04-03 NOTE — PATIENT INSTRUCTIONS
Follow-up as scheduled.  Follow-up if area develops any redness, swelling, heat, drainage or signs of infection.

## 2024-04-03 NOTE — PROGRESS NOTES
"Assessment/Plan:   Patient Instructions   Follow-up as scheduled.  Follow-up if area develops any redness, swelling, heat, drainage or signs of infection.       Quality Measures:   Depression Screening and Follow-up Plan: Patient was screened for depression during today's encounter. They screened negative with a PHQ-2 score of 0.         Return if symptoms worsen or fail to improve, for Next scheduled follow up.         Diagnoses and all orders for this visit:    Verruca  Comments:  R upper pretibial area.  Orders:  -     Lesion Destruction          Subjective:      Patient ID: Keesha Walker is a 58 y.o. female.    Patient presents after she has been watching a growth below her right knee.  She has tried \"scratching it off\" several times but it has come back.  Not painful.  Not affecting the joint.        ALLERGIES:  No Known Allergies    CURRENT MEDICATIONS:    Current Outpatient Medications:   •  albuterol (Ventolin HFA) 90 mcg/act inhaler, Inhale 2 puffs every 6 (six) hours as needed for wheezing, Disp: 54 g, Rfl: 3  •  budesonide-formoterol (Symbicort) 160-4.5 mcg/act inhaler, Inhale 2 puffs 2 (two) times a day Rinse mouth after use., Disp: 40.8 g, Rfl: 3  •  eletriptan (RELPAX) 40 MG tablet, 1 tab po at migraine onset, may repeat in 2 hours if needed. Max 2/24 hours, 27 per 3 month., Disp: 27 tablet, Rfl: 3  •  meloxicam (MOBIC) 15 mg tablet, Take 15 mg by mouth daily, Disp: , Rfl:   •  verapamil (CALAN-SR) 120 mg CR tablet, Take 1 tablet (120 mg total) by mouth daily at bedtime, Disp: 90 tablet, Rfl: 4  •  Euflexxa 20 MG/2ML SOSY, if needed (Patient not taking: Reported on 4/3/2024), Disp: , Rfl:   •  polyethylene glycol (GOLYTELY) 4000 mL solution, Take 4,000 mL by mouth once for 1 dose, Disp: 4000 mL, Rfl: 0    ACTIVE PROBLEM LIST:  Patient Active Problem List   Diagnosis   • Thrombophlebitis of superficial veins of right lower extremity   • Chronic venous insufficiency   • Lymphedema   • Back pain, " lumbosacral   • Varicose veins of legs   • Cluster headaches   • Asymptomatic postmenopausal status   • Abnormal resting ECG findings   • Carpal tunnel syndrome   • Chronic peripheral venous hypertension   • Degenerative joint disease of right hip   • Radial styloid tenosynovitis   • Osteoarthritis of acromioclavicular joint   • Spider angioma   • Status post hip surgery   • Bleeding from varicose veins of lower extremity   • Dyspnea on exertion   • Arthralgia of right knee       PAST MEDICAL HISTORY:  Past Medical History:   Diagnosis Date   • Asymptomatic postmenopausal status    • Back pain, lumbosacral    • Biceps tendinitis 3/23/2022   • Cellulitis of left lower extremity     last assessed....3/30/16    resolved....9/8/16     • Cellulitis of lower extremity 03/23/2022   • Cluster headaches    • Colon polyps    • CTS (carpal tunnel syndrome) 3 years ago   • Dense breasts    • Foot pain, left    • Head injury     Dec 2017 no LOC   • Hip pain, right    • Lumbosacral pain    • Lymph edema    • Migraine 6 weeks   cluster headaches   • Nosebleed    • Right hip pain    • Spider veins    • Tendinitis of right shoulder    • Varicose veins of legs        PAST SURGICAL HISTORY:  Past Surgical History:   Procedure Laterality Date   • BACK SURGERY  1990    type unknown- occurred during vaginal delivery   • BREAST BIOPSY Left 2019   • COLONOSCOPY  12/18/2019   • HAND TENDON SURGERY     • JOINT REPLACEMENT  hip  8/20   • MAMMO NEEDLE LOCALIZATION LEFT (ALL INC) Left 4/17/2019   • [place] NEUROPLASTY &/TRANSPOS MEDIAN NRV CARPAL TUNNE Right 09/12/2016    Procedure: WRIST OPEN CARPAL TUNNEL RELEASE ;  Surgeon: Rayna Cabral MD;  Location[facility];  Service: Orthopedics   • SINUS SURGERY     • TOTAL HIP ARTHROPLASTY Right 08/24/2020   • TUBAL LIGATION     • VASCULAR SURGERY  various dates   • VEIN LIGATION AND STRIPPING Left 1987       FAMILY HISTORY:  Family History   Problem Relation Age of Onset   • Cancer Mother          cervical   • Sudden death Mother         MVA hit by a drunk    • Varicose Veins Father    • Heart disease Father         cardiac disorder   • Heart attack Father    • Cancer Sister         appendex   • Cancer Sister         Appendix   • Cancer Sister         breast   • Migraines Daughter    • Other Maternal Grandmother         gyn problem   • Cervical cancer Maternal Grandmother    • Breast cancer Maternal Aunt         neoplasm   • Colon cancer Neg Hx    • Ovarian cancer Neg Hx    • Uterine cancer Neg Hx        SOCIAL HISTORY:  Social History     Socioeconomic History   • Marital status: /Civil Union     Spouse name: Not on file   • Number of children: 2   • Years of education: Not on file   • Highest education level: Not on file   Occupational History     Comment: full time - [employer]   Tobacco Use   • Smoking status: Former     Current packs/day: 0.00     Average packs/day: 1 pack/day for 44.1 years (44.1 ttl pk-yrs)     Types: Cigarettes     Start date: 1976     Quit date: 2/15/2020     Years since quittin.1   • Smokeless tobacco: Never   • Tobacco comments:     started smoking at 11 years old   Vaping Use   • Vaping status: Never Used   Substance and Sexual Activity   • Alcohol use: Not Currently     Comment: rare   • Drug use: No   • Sexual activity: Yes     Partners: Male     Birth control/protection: Female Sterilization   Other Topics Concern   • Not on file   Social History Narrative    Brushes teeth twice a day    Dental care regularly        Two children    Works for the post office-retired     Social Determinants of Health     Financial Resource Strain: Not on file   Food Insecurity: Not on file   Transportation Needs: Not on file   Physical Activity: Not on file   Stress: Not on file   Social Connections: Not on file   Intimate Partner Violence: Not on file   Housing Stability: Not on file       Review of Systems   Constitutional:  Negative for activity change, chills,  "fatigue and fever.   HENT:  Negative for congestion.    Eyes:  Negative for discharge.   Respiratory:  Negative for cough, chest tightness and shortness of breath.    Cardiovascular:  Negative for chest pain, palpitations and leg swelling.   Gastrointestinal:  Negative for abdominal pain.   Genitourinary:  Negative for difficulty urinating.   Musculoskeletal:  Negative for arthralgias and myalgias.   Skin:  Negative for rash.   Allergic/Immunologic: Negative for immunocompromised state.   Neurological:  Negative for dizziness, syncope, weakness, light-headedness and headaches.   Hematological:  Negative for adenopathy. Does not bruise/bleed easily.   Psychiatric/Behavioral:  Negative for dysphoric mood. The patient is not nervous/anxious.          Objective:  Vitals:    04/03/24 1439   BP: 124/68   BP Location: Left arm   Patient Position: Sitting   Cuff Size: Large   Pulse: 71   SpO2: 96%   Weight: 130 kg (286 lb)   Height: 5' 11\" (1.803 m)     Body mass index is 39.89 kg/m².     Physical Exam  Vitals and nursing note reviewed.   Constitutional:       General: She is not in acute distress.     Appearance: She is well-developed. She is obese.   HENT:      Head: Normocephalic and atraumatic.   Eyes:      Extraocular Movements: Extraocular movements intact.      Pupils: Pupils are equal, round, and reactive to light.   Pulmonary:      Effort: Pulmonary effort is normal. No respiratory distress.   Musculoskeletal:      Right lower leg: Edema (Chronic lymphedema) present.      Left lower leg: Edema (Chronic lymphedema) present.   Skin:     General: Skin is warm and dry.      Findings: No rash.      Comments: 4 mm round slightly elevated skin colored verruca type lesion right proximal pretibial area   Neurological:      General: No focal deficit present.      Mental Status: She is alert and oriented to person, place, and time. Mental status is at baseline.   Psychiatric:         Mood and Affect: Mood normal.         " Behavior: Behavior normal.     Lesion Destruction    Date/Time: 4/3/2024 2:40 PM    Performed by: Ozzie Silvestre PA-C  Authorized by: Ozzie Silvestre PA-C  Universal Protocol:  Consent: Verbal consent obtained.  Risks and benefits: risks, benefits and alternatives were discussed  Consent given by: patient  Patient understanding: patient states understanding of the procedure being performed  Patient identity confirmed: verbally with patient       Cryodestruction of 4 mm round verruca type lesion of right pretibial area.          RESULTS:  Cholesterol, Total   Date/Time Value Ref Range Status   06/21/2022 07:51  100 - 199 mg/dL Final     Triglycerides   Date/Time Value Ref Range Status   06/21/2022 07:51  0 - 149 mg/dL Final     HDL   Date/Time Value Ref Range Status   06/21/2022 07:51 AM 53 >39 mg/dL Final     LDL Calculated   Date/Time Value Ref Range Status   06/21/2022 07:51  (H) 0 - 99 mg/dL Final     Hemoglobin   Date/Time Value Ref Range Status   06/21/2022 07:51 AM 12.5 11.1 - 15.9 g/dL Final   04/27/2021 05:49 PM 11.0 (L) 11.5 - 15.4 g/dL Final   08/25/2020 06:44 AM 10.4 (L) 11.5 - 14.5 g/dL Final     Hematocrit   Date/Time Value Ref Range Status   04/27/2021 05:49 PM 35.2 34.8 - 46.1 % Final   08/25/2020 06:44 AM 30.8 (L) 35.0 - 43.0 % Final     HCT   Date/Time Value Ref Range Status   06/21/2022 07:51 AM 38.9 34.0 - 46.6 % Final     Platelet Count   Date/Time Value Ref Range Status   06/21/2022 07:51  150 - 450 x10E3/uL Final     Platelets   Date/Time Value Ref Range Status   02/28/2021 09:11  149 - 390 Thousands/uL Final   08/31/2016 02:52  150 - 379 x10E3/uL Final     TSH 3RD GENERATON   Date/Time Value Ref Range Status   04/08/2016 09:53 AM 0.771 0.450 - 4.500 uIU/mL Final     Comment:     Performed at: [facility], [address] [place] [address], , [place], [place], [id number], [id number]  MD:  [address] [place], [place] [id number]       Free t4   Date/Time Value Ref Range Status    06/21/2022 07:51 AM 1.16 0.82 - 1.77 ng/dL Final     Sodium   Date/Time Value Ref Range Status   06/21/2022 07:51  134 - 144 mmol/L Final   08/25/2020 06:44  136 - 145 mmol/L Final     BUN   Date/Time Value Ref Range Status   06/21/2022 07:51 AM 13 6 - 24 mg/dL Final   08/25/2020 06:44 AM 9 7 - 25 mg/dL Final     Creatinine   Date/Time Value Ref Range Status   06/21/2022 07:51 AM 0.67 0.57 - 1.00 mg/dL Final   02/28/2021 09:11 PM 0.75 0.60 - 1.30 mg/dL Final     Comment:     Standardized to IDMS reference method   08/25/2020 06:44 AM 0.60 0.60 - 1.20 mg/dL Final      In chart    This note was created with voice recognition software.  Phonic, grammatical and spelling errors may be present within the note as a result.

## 2024-04-08 ENCOUNTER — ANNUAL EXAM (OUTPATIENT)
Dept: OBGYN CLINIC | Facility: CLINIC | Age: 59
End: 2024-04-08
Payer: COMMERCIAL

## 2024-04-08 VITALS
SYSTOLIC BLOOD PRESSURE: 122 MMHG | DIASTOLIC BLOOD PRESSURE: 80 MMHG | HEIGHT: 71 IN | WEIGHT: 288 LBS | BODY MASS INDEX: 40.32 KG/M2

## 2024-04-08 DIAGNOSIS — Z12.31 SCREENING MAMMOGRAM, ENCOUNTER FOR: ICD-10-CM

## 2024-04-08 DIAGNOSIS — Z01.419 ENCOUNTER FOR GYNECOLOGICAL EXAMINATION WITHOUT ABNORMAL FINDING: Primary | ICD-10-CM

## 2024-04-08 DIAGNOSIS — Z78.0 ASYMPTOMATIC POSTMENOPAUSAL STATUS: ICD-10-CM

## 2024-04-08 PROBLEM — N63.11 MASS OF UPPER OUTER QUADRANT OF RIGHT BREAST: Status: ACTIVE | Noted: 2023-05-10

## 2024-04-08 PROCEDURE — G0145 SCR C/V CYTO,THINLAYER,RESCR: HCPCS | Performed by: NURSE PRACTITIONER

## 2024-04-08 PROCEDURE — 99396 PREV VISIT EST AGE 40-64: CPT | Performed by: NURSE PRACTITIONER

## 2024-04-08 NOTE — PROGRESS NOTES
Diagnoses and all orders for this visit:    Encounter for gynecological examination without abnormal finding  -     Liquid-based pap, screening    Asymptomatic postmenopausal status    Screening mammogram for breast cancer  -     Mammo screening bilateral w 3d & cad; Future    Call as needed, encouraged calcium/vit D in her diet, call with any PMB, all questions answered.          Pleasant 58 y.o. postmenopausal female here for annual exam. She denies postmenopausal bleeding. Denies history of abnormal pap smears, last Paps NIL in 2021, 2022 and 2023, HPV neg in 2020, REQUESTS YEARLY PAPS, Reflex pap done today. Denies vaginal issues. Denies pelvic pain. Denies postmenopausal issues. Sexually active without any issues. Colonoscopy due again in 6/2024 by Dr Escobar. Stopped smoking 2020, applauded her for not going back. Mammogram 04/05/2023  and 12/19/23 normal, recently had a benign biopsy. Sister with family hx of breast cancer.    Past Medical History:   Diagnosis Date    Asymptomatic postmenopausal status     Back pain, lumbosacral     Biceps tendinitis 3/23/2022    Cellulitis of left lower extremity     last assessed....3/30/16    resolved....9/8/16      Cellulitis of lower extremity 03/23/2022    Cluster headaches     Colon polyps     CTS (carpal tunnel syndrome) 3 years ago    Dense breasts     Foot pain, left     Head injury     Dec 2017 no LOC    Hip pain, right     Lumbosacral pain     Lymph edema     Migraine 6 weeks   cluster headaches    Nosebleed     Right hip pain     Spider veins     Tendinitis of right shoulder     Varicose veins of legs      Past Surgical History:   Procedure Laterality Date    BACK SURGERY  1990    type unknown- occurred during vaginal delivery    BREAST BIOPSY Left 2019    COLONOSCOPY  12/18/2019    HAND TENDON SURGERY      JOINT REPLACEMENT  hip  8/20    MAMMO NEEDLE LOCALIZATION LEFT (ALL INC) Left 4/17/2019    NE NEUROPLASTY &/TRANSPOS MEDIAN NRV CARPAL TUNNE Right  2016    Procedure: WRIST OPEN CARPAL TUNNEL RELEASE ;  Surgeon: Rayna Cabral MD;  Location: [facility];  Service: Orthopedics    SINUS SURGERY      TOTAL HIP ARTHROPLASTY Right 2020    TUBAL LIGATION      VASCULAR SURGERY  various dates    VEIN LIGATION AND STRIPPING Left      Family History   Problem Relation Age of Onset    Cancer Mother         cervical    Sudden death Mother         MVA hit by a drunk     Varicose Veins Father     Heart disease Father         cardiac disorder    Heart attack Father     Cancer Sister         appendex    Cancer Sister         Appendix    Cancer Sister         Breast    Migraines Daughter     Other Maternal Grandmother         gyn problem    Cervical cancer Maternal Grandmother     Breast cancer Maternal Aunt         neoplasm    Colon cancer Neg Hx     Ovarian cancer Neg Hx     Uterine cancer Neg Hx      Social History     Tobacco Use    Smoking status: Former     Current packs/day: 0.00     Average packs/day: 1 pack/day for 44.1 years (44.1 ttl pk-yrs)     Types: Cigarettes     Start date: 1976     Quit date: 2/15/2020     Years since quittin.1    Smokeless tobacco: Never    Tobacco comments:     started smoking at 11 years old   Vaping Use    Vaping status: Never Used   Substance Use Topics    Alcohol use: Not Currently     Comment: rare    Drug use: No       Current Outpatient Medications:     albuterol (Ventolin HFA) 90 mcg/act inhaler, Inhale 2 puffs every 6 (six) hours as needed for wheezing, Disp: 54 g, Rfl: 3    budesonide-formoterol (Symbicort) 160-4.5 mcg/act inhaler, Inhale 2 puffs 2 (two) times a day Rinse mouth after use., Disp: 40.8 g, Rfl: 3    eletriptan (RELPAX) 40 MG tablet, 1 tab po at migraine onset, may repeat in 2 hours if needed. Max 2/24 hours, 27 per 3 month., Disp: 27 tablet, Rfl: 3    Euflexxa 20 MG/2ML SOSY, if needed, Disp: , Rfl:     meloxicam (MOBIC) 15 mg tablet, Take 15 mg by mouth daily, Disp: , Rfl:     verapamil  "(CALAN-SR) 120 mg CR tablet, Take 1 tablet (120 mg total) by mouth daily at bedtime, Disp: 90 tablet, Rfl: 4  Patient Active Problem List    Diagnosis Date Noted    Mass of upper outer quadrant of right breast 05/10/2023    Arthralgia of right knee 2023    Dyspnea on exertion     Carpal tunnel syndrome 2022    Chronic peripheral venous hypertension 2022    Spider angioma 2022    Bleeding from varicose veins of lower extremity 2022    Osteoarthritis of acromioclavicular joint 06/10/2021    Degenerative joint disease of right hip 2020    Status post hip surgery 2020    Radial styloid tenosynovitis 2020    Abnormal resting ECG findings 04/15/2019    Asymptomatic postmenopausal status 2019    Cluster headaches 2018    Varicose veins of legs 2018    Thrombophlebitis of superficial veins of right lower extremity 2018    Chronic venous insufficiency 2018    Lymphedema 2018    Back pain, lumbosacral 2016       No Known Allergies    OB History    Para Term  AB Living   2 2 2     2   SAB IAB Ectopic Multiple Live Births           2      # Outcome Date GA Lbr Samuel/2nd Weight Sex Delivery Anes PTL Lv   2 Term            1 Term              Worked in [employer] part time- officially retired now!  retiring in 3 weeks  and they will travel more.  2 grown children, son and daughter, no grands yet    Vitals:    24 1304   BP: 122/80   Weight: 131 kg (288 lb)   Height: 5' 11\" (1.803 m)       Body mass index is 40.17 kg/m².    Review of Systems   Constitutional: Negative for chills, fatigue, fever and unexpected weight change.   Respiratory: Negative for shortness of breath.    Gastrointestinal: Negative for anal bleeding, blood in stool and diarrhea. +Occasional constipation, uses fiber which helps alot  Genitourinary: Negative for difficulty urinating, dysuria and hematuria.     Physical Exam   Constitutional: She " appears well-developed and well-nourished. No distress. Bilateral lymphedema  HENT: atraumatic, EOMI  Head: Normocephalic.   Neck: Normal range of motion. Neck supple.   Pulmonary: Effort normal.  Breasts: bilateral without masses, skin changes or nipple discharge. Bilaterally soft and warm to touch. No areas of erythema or pain.  Abdominal: Soft.   Pelvic exam was performed with patient supine. No labial fusion. There is no rash, tenderness or injury on the right labia. Right labial inclusion cysts noted. There is no rash, tenderness, lesion or injury on the left labia. Urethral meatus does not show any tenderness, inflammation or discharge. Palpation of midline bladder without pain or discomfort. Uterus is not deviated, not enlarged, not fixed and not tender. Cervix exhibits no motion tenderness, no discharge and no friability. Right adnexum displays no mass, no tenderness and no fullness. Left adnexum displays no mass, no tenderness and no fullness. No erythema or tenderness in the vagina. No foreign body in the vagina. No signs of injury around the vagina or anus. Perineum without lesions, signs of injury, erythema or swelling. No vaginal discharge found. White spec used.  Lymphadenopathy:        Right: No inguinal adenopathy present.        Left: No inguinal adenopathy present.

## 2024-04-13 LAB
LAB AP GYN PRIMARY INTERPRETATION: NORMAL
Lab: NORMAL

## 2024-04-25 NOTE — PATIENT INSTRUCTIONS
Rest, increase fluids  Tylenol for fever or aches as per package instructions  Start finish antibiotic  Always wise to take a probiotic and or eat yogurt daily when on an antibiotic  Also add Flonase 1 spray each nostril twice a day as instructed and demonstrated  Remember it takes about 5 days for this to start working  Keep me informed if you're not steadily improving  hyperglycemia

## 2024-05-15 ENCOUNTER — PREP FOR PROCEDURE (OUTPATIENT)
Age: 59
End: 2024-05-15

## 2024-05-15 ENCOUNTER — OFFICE VISIT (OUTPATIENT)
Age: 59
End: 2024-05-15
Payer: COMMERCIAL

## 2024-05-15 VITALS
HEART RATE: 89 BPM | OXYGEN SATURATION: 97 % | HEIGHT: 71 IN | DIASTOLIC BLOOD PRESSURE: 76 MMHG | SYSTOLIC BLOOD PRESSURE: 125 MMHG | BODY MASS INDEX: 40.32 KG/M2 | WEIGHT: 288 LBS

## 2024-05-15 DIAGNOSIS — R19.4 ENCOUNTER FOR DIAGNOSTIC COLONOSCOPY DUE TO CHANGE IN BOWEL HABITS: Primary | ICD-10-CM

## 2024-05-15 DIAGNOSIS — D12.3 ADENOMATOUS POLYP OF TRANSVERSE COLON: Primary | ICD-10-CM

## 2024-05-15 PROCEDURE — 99213 OFFICE O/P EST LOW 20 MIN: CPT | Performed by: COLON & RECTAL SURGERY

## 2024-05-17 NOTE — PATIENT INSTRUCTIONS
Scheduled date of colonoscopy (as of today): 6/3/24  Physician performing colonoscopy: Shawn  Location of colonoscopy: [facility]  Bowel prep reviewed with patient: Golytely  Instructions reviewed with patient by: Dorothea AGUILERA  Clearances:

## 2024-05-18 NOTE — H&P (VIEW-ONLY)
"Ambulatory Visit  Name: Keesha Walker      : 1965      MRN: [id number]  Encounter Provider: Larry Escobar MD  Encounter Date: 5/15/2024   Encounter department: [facility] [place]    Assessment & Plan   1. Adenomatous polyp of transverse colon  -     Colonoscopy; Future; Expected date: 05/15/2024    Plan:     A detailed discussion was had with the patient reviewing her previous colonoscopic examination 6 months ago.  The area of polypoid structure in the transverse colon had the appearance of a smooth, yellow lipoma.  To confirm the lipoma diagnosis A biopsy was done, and the final pathology revealed a tubular adenoma.  The patient was advised that the polyp was not completely removed, with a simple biopsy, and that a snare polypectomy would be required.  The patient was completely understanding of the explanation given by me, and desires to move forward with colonoscopic evaluation for completion polypectomy.  History of Present Illness     Keesha Walker is a 58 y.o. female who presents for colonoscopic evaluation.  The patient has a serrated adenoma and the transverse colon that was biopsied and incompletely removed.  The patient presents today to schedule completion colonoscopic polypectomy.  Review of Systems   Constitutional:  Negative for chills and fever.   HENT:  Negative for ear pain and sore throat.    Eyes:  Negative for pain and visual disturbance.   Respiratory:  Negative for cough and shortness of breath.    Cardiovascular:  Negative for chest pain and palpitations.   Gastrointestinal:  Negative for abdominal pain and vomiting.   Genitourinary:  Negative for dysuria and hematuria.   Musculoskeletal:  Negative for arthralgias and back pain.   Skin:  Negative for color change and rash.   Neurological:  Negative for seizures and syncope.   All other systems reviewed and are negative.      Objective     /76   Pulse 89   Ht 5' 11\" (1.803 m)   Wt " 131 kg (288 lb)   LMP 01/01/2015   SpO2 97%   BMI 40.17 kg/m²     Physical Exam  Vitals and nursing note reviewed.   Constitutional:       Appearance: Normal appearance. She is obese.   HENT:      Head: Normocephalic and atraumatic.   Eyes:      Conjunctiva/sclera: Conjunctivae normal.   Cardiovascular:      Rate and Rhythm: Normal rate and regular rhythm.      Heart sounds: Normal heart sounds.   Pulmonary:      Effort: Pulmonary effort is normal.      Breath sounds: Normal breath sounds.   Abdominal:      General: Bowel sounds are normal.      Palpations: Abdomen is soft.   Musculoskeletal:      Cervical back: Neck supple.   Skin:     General: Skin is warm and dry.   Neurological:      Mental Status: She is alert and oriented to person, place, and time.   Psychiatric:         Mood and Affect: Mood normal.         Behavior: Behavior normal.         Thought Content: Thought content normal.         Judgment: Judgment normal.       Administrative Statements

## 2024-05-18 NOTE — PROGRESS NOTES
"Ambulatory Visit  Name: Keesha Walker      : 1965      MRN: [id number]  Encounter Provider: Larry Escobar MD  Encounter Date: 5/15/2024   Encounter department: [facility] [place]    Assessment & Plan   1. Adenomatous polyp of transverse colon  -     Colonoscopy; Future; Expected date: 05/15/2024    Plan:     A detailed discussion was had with the patient reviewing her previous colonoscopic examination 6 months ago.  The area of polypoid structure in the transverse colon had the appearance of a smooth, yellow lipoma.  To confirm the lipoma diagnosis A biopsy was done, and the final pathology revealed a tubular adenoma.  The patient was advised that the polyp was not completely removed, with a simple biopsy, and that a snare polypectomy would be required.  The patient was completely understanding of the explanation given by me, and desires to move forward with colonoscopic evaluation for completion polypectomy.  History of Present Illness     Keesha Walker is a 58 y.o. female who presents for colonoscopic evaluation.  The patient has a serrated adenoma and the transverse colon that was biopsied and incompletely removed.  The patient presents today to schedule completion colonoscopic polypectomy.  Review of Systems   Constitutional:  Negative for chills and fever.   HENT:  Negative for ear pain and sore throat.    Eyes:  Negative for pain and visual disturbance.   Respiratory:  Negative for cough and shortness of breath.    Cardiovascular:  Negative for chest pain and palpitations.   Gastrointestinal:  Negative for abdominal pain and vomiting.   Genitourinary:  Negative for dysuria and hematuria.   Musculoskeletal:  Negative for arthralgias and back pain.   Skin:  Negative for color change and rash.   Neurological:  Negative for seizures and syncope.   All other systems reviewed and are negative.      Objective     /76   Pulse 89   Ht 5' 11\" (1.803 m)   Wt " 131 kg (288 lb)   LMP 01/01/2015   SpO2 97%   BMI 40.17 kg/m²     Physical Exam  Vitals and nursing note reviewed.   Constitutional:       Appearance: Normal appearance. She is obese.   HENT:      Head: Normocephalic and atraumatic.   Eyes:      Conjunctiva/sclera: Conjunctivae normal.   Cardiovascular:      Rate and Rhythm: Normal rate and regular rhythm.      Heart sounds: Normal heart sounds.   Pulmonary:      Effort: Pulmonary effort is normal.      Breath sounds: Normal breath sounds.   Abdominal:      General: Bowel sounds are normal.      Palpations: Abdomen is soft.   Musculoskeletal:      Cervical back: Neck supple.   Skin:     General: Skin is warm and dry.   Neurological:      Mental Status: She is alert and oriented to person, place, and time.   Psychiatric:         Mood and Affect: Mood normal.         Behavior: Behavior normal.         Thought Content: Thought content normal.         Judgment: Judgment normal.       Administrative Statements

## 2024-05-28 ENCOUNTER — OFFICE VISIT (OUTPATIENT)
Dept: INTERNAL MEDICINE CLINIC | Facility: CLINIC | Age: 59
End: 2024-05-28
Payer: COMMERCIAL

## 2024-05-28 VITALS
DIASTOLIC BLOOD PRESSURE: 72 MMHG | HEART RATE: 80 BPM | TEMPERATURE: 98.9 F | SYSTOLIC BLOOD PRESSURE: 114 MMHG | HEIGHT: 71 IN | WEIGHT: 288 LBS | OXYGEN SATURATION: 97 % | RESPIRATION RATE: 18 BRPM | BODY MASS INDEX: 40.32 KG/M2

## 2024-05-28 DIAGNOSIS — Z78.0 POSTMENOPAUSAL STATUS (AGE-RELATED) (NATURAL): ICD-10-CM

## 2024-05-28 DIAGNOSIS — Z00.00 ANNUAL PHYSICAL EXAM: Primary | ICD-10-CM

## 2024-05-28 DIAGNOSIS — R06.09 DYSPNEA ON EXERTION: ICD-10-CM

## 2024-05-28 DIAGNOSIS — Z13.6 SCREENING FOR HEART DISEASE: ICD-10-CM

## 2024-05-28 DIAGNOSIS — I89.0 LYMPHEDEMA: ICD-10-CM

## 2024-05-28 PROCEDURE — 99396 PREV VISIT EST AGE 40-64: CPT | Performed by: PHYSICIAN ASSISTANT

## 2024-05-28 NOTE — PROGRESS NOTES
Assessment/Plan:   General medical exam is good other than chronic lymphedema.  Please try using your albuterol inhaler prior to going up and down stairs multiple times.  Keep me informed if this is effective.  Have labs done for follow-up visit in 4 months.  Follow-up sooner as needed.   Quality Measures:       Return in about 4 months (around 9/28/2024) for Next scheduled follow up.         Diagnoses and all orders for this visit:    Annual physical exam    Lymphedema  -     Ambulatory Referral to PT/OT Lymphedema Therapy; Future  -     CBC and differential; Future  -     Comprehensive metabolic panel; Future    Postmenopausal status (age-related) (natural)  -     DXA bone density spine hip and pelvis; Future    Dyspnea on exertion  -     CBC and differential; Future  -     Comprehensive metabolic panel; Future    Screening for heart disease  -     Lipid panel; Future          Subjective:      Patient ID: Keesha Walker is a 58 y.o. female.    58-year-old female presents for her annual physical.  Overall patient states she feels well.  She is waiting an appointment with physical therapy/Occupational Therapy for her chronic lymphedema.    Patient continues to experience shortness of breath on exertion.  For example going up and down stairs multiple times.  Or walking long distances.  She has been seen and evaluated by pulmonary.  She does not believe combination inhalers have been beneficial.  She has periodically used her albuterol inhaler which she feels is more beneficial than the steroid combos.  She denies any typical cardiac symptoms.    Patient also notes that she will be having a repeat colonoscopy early next week to fully remove a polyp that is an adenomatous polyp in the transverse colon.    EMR has been reviewed, clarified, and updated with patient today.        ALLERGIES:  No Known Allergies    CURRENT MEDICATIONS:    Current Outpatient Medications:     albuterol (Ventolin HFA) 90 mcg/act inhaler,  Inhale 2 puffs every 6 (six) hours as needed for wheezing (Patient taking differently: Inhale 2 puffs every 6 (six) hours as needed for wheezing PRN), Disp: 54 g, Rfl: 3    eletriptan (RELPAX) 40 MG tablet, 1 tab po at migraine onset, may repeat in 2 hours if needed. Max 2/24 hours, 27 per 3 month., Disp: 27 tablet, Rfl: 3    meloxicam (MOBIC) 15 mg tablet, Take 15 mg by mouth daily, Disp: , Rfl:     verapamil (CALAN-SR) 120 mg CR tablet, Take 1 tablet (120 mg total) by mouth daily at bedtime, Disp: 90 tablet, Rfl: 4    Euflexxa 20 MG/2ML SOSY, if needed (Patient not taking: Reported on 5/28/2024), Disp: , Rfl:     polyethylene glycol (GOLYTELY) 4000 mL solution, Take 4,000 mL by mouth once for 1 dose, Disp: 4000 mL, Rfl: 0    ACTIVE PROBLEM LIST:  Patient Active Problem List   Diagnosis    Thrombophlebitis of superficial veins of right lower extremity    Chronic venous insufficiency    Lymphedema    Back pain, lumbosacral    Varicose veins of legs    Cluster headaches    Asymptomatic postmenopausal status    Abnormal resting ECG findings    Carpal tunnel syndrome    Chronic peripheral venous hypertension    Degenerative joint disease of right hip    Radial styloid tenosynovitis    Osteoarthritis of acromioclavicular joint    Spider angioma    Status post hip surgery    Bleeding from varicose veins of lower extremity    Dyspnea on exertion    Arthralgia of right knee    Mass of upper outer quadrant of right breast       PAST MEDICAL HISTORY:  Past Medical History:   Diagnosis Date    Asymptomatic postmenopausal status     Back pain, lumbosacral     Biceps tendinitis 3/23/2022    Cellulitis of left lower extremity     last assessed....3/30/16    resolved....9/8/16      Cellulitis of lower extremity 03/23/2022    Cluster headaches     Colon polyps     CTS (carpal tunnel syndrome) 3 years ago    Dense breasts     Foot pain, left     Head injury     Dec 2017 no LOC    Hip pain, right     Lumbosacral pain     Lymph edema      Migraine 6 weeks   cluster headaches    Nosebleed     Right hip pain     Spider veins     Tendinitis of right shoulder     Varicose veins of legs        PAST SURGICAL HISTORY:  Past Surgical History:   Procedure Laterality Date    BACK SURGERY  1990    type unknown- occurred during vaginal delivery    BREAST BIOPSY Left 2019    COLONOSCOPY  12/18/2019    HAND TENDON SURGERY      JOINT REPLACEMENT  hip  8/20    MAMMO NEEDLE LOCALIZATION LEFT (ALL INC) Left 4/17/2019    IA NEUROPLASTY &/TRANSPOS MEDIAN NRV CARPAL TUNNE Right 09/12/2016    Procedure: WRIST OPEN CARPAL TUNNEL RELEASE ;  Surgeon: Rayna Cabral MD;  Location: [facility];  Service: Orthopedics    SINUS SURGERY      TOTAL HIP ARTHROPLASTY Right 08/24/2020    TUBAL LIGATION      VASCULAR SURGERY  various dates    VEIN LIGATION AND STRIPPING Left 1987       FAMILY HISTORY:  Family History   Problem Relation Age of Onset    Cancer Mother         cervical    Sudden death Mother         MVA hit by a drunk     Varicose Veins Father     Heart disease Father         cardiac disorder    Heart attack Father     Cancer Sister         appendex    Cancer Sister         Appendix    Cancer Sister         Breast    Migraines Daughter     Other Maternal Grandmother         gyn problem    Cervical cancer Maternal Grandmother     Breast cancer Maternal Aunt         neoplasm    Colon cancer Neg Hx     Ovarian cancer Neg Hx     Uterine cancer Neg Hx        SOCIAL HISTORY:  Social History     Socioeconomic History    Marital status: /Civil Union     Spouse name: Not on file    Number of children: 2    Years of education: Not on file    Highest education level: Not on file   Occupational History     Comment: full time - post [employer]   Tobacco Use    Smoking status: Former     Current packs/day: 0.00     Average packs/day: 1 pack/day for 44.1 years (44.1 ttl pk-yrs)     Types: Cigarettes     Start date: 1/1/1976     Quit date: 2/15/2020     Years since  quittin.2    Smokeless tobacco: Never    Tobacco comments:     started smoking at 11 years old   Vaping Use    Vaping status: Never Used   Substance and Sexual Activity    Alcohol use: Not Currently     Comment: rare    Drug use: No    Sexual activity: Yes     Partners: Male     Birth control/protection: Female Sterilization   Other Topics Concern    Not on file   Social History Narrative    Brushes teeth twice a day    Dental care regularly        Two children    Works for the post office-retired     Social Determinants of Health     Financial Resource Strain: Not on file   Food Insecurity: Not on file   Transportation Needs: Not on file   Physical Activity: Not on file   Stress: Not on file   Social Connections: Not on file   Intimate Partner Violence: Not on file   Housing Stability: Not on file       Review of Systems   Constitutional:  Negative for activity change, chills, fatigue and fever.   HENT:  Negative for congestion.    Eyes:  Negative for discharge.   Respiratory:  Positive for shortness of breath and wheezing. Negative for cough and chest tightness.    Cardiovascular:  Positive for leg swelling (Chronic lymphedema). Negative for chest pain and palpitations.   Gastrointestinal:  Negative for abdominal pain, blood in stool, constipation, diarrhea, nausea and vomiting.   Endocrine: Negative for polydipsia, polyphagia and polyuria.   Genitourinary:  Negative for difficulty urinating.   Musculoskeletal:  Negative for arthralgias and myalgias.   Skin:  Negative for rash.   Allergic/Immunologic: Negative for immunocompromised state.   Neurological:  Negative for dizziness, syncope, weakness, light-headedness and headaches.   Hematological:  Negative for adenopathy. Does not bruise/bleed easily.   Psychiatric/Behavioral:  Negative for dysphoric mood, sleep disturbance and suicidal ideas. The patient is not nervous/anxious.          Objective:  Vitals:    24 1503   BP: 114/72   BP Location:  "Left arm   Patient Position: Sitting   Cuff Size: Standard   Pulse: 80   Resp: 18   Temp: 98.9 °F (37.2 °C)   TempSrc: Tympanic   SpO2: 97%   Weight: 131 kg (288 lb)   Height: 5' 11\" (1.803 m)     Body mass index is 40.17 kg/m².     Physical Exam  Vitals and nursing note reviewed.   Constitutional:       General: She is not in acute distress.     Appearance: She is well-developed. She is obese. She is not ill-appearing.      Comments: Well-developed, obese 58-year-old female appearing about stated age in no acute distress.   HENT:      Head: Normocephalic and atraumatic.      Right Ear: Ear canal and external ear normal.      Left Ear: Ear canal and external ear normal.      Ears:      Comments: TMs poorly visualized due to cerumen in external auditory canals     Nose: Nose normal.      Mouth/Throat:      Mouth: Mucous membranes are moist.      Pharynx: Oropharynx is clear.   Eyes:      General: No scleral icterus.        Right eye: No discharge.         Left eye: No discharge.      Conjunctiva/sclera: Conjunctivae normal.      Pupils: Pupils are equal, round, and reactive to light.   Neck:      Thyroid: No thyromegaly.      Vascular: No carotid bruit.   Cardiovascular:      Rate and Rhythm: Normal rate and regular rhythm.      Pulses: Normal pulses.      Heart sounds: Normal heart sounds. No murmur heard.  Pulmonary:      Effort: Pulmonary effort is normal. No respiratory distress.      Breath sounds: Normal breath sounds.   Chest:      Chest wall: No tenderness.   Abdominal:      General: Abdomen is flat. Bowel sounds are normal. There is no distension.      Palpations: Abdomen is soft. There is no hepatomegaly, splenomegaly or mass.      Tenderness: There is no abdominal tenderness. There is no right CVA tenderness, left CVA tenderness, guarding or rebound.   Musculoskeletal:         General: No swelling. Normal range of motion.      Cervical back: Normal range of motion and neck supple.      Right lower leg: " Edema (Phonic lymphedema) present.      Left lower leg: Edema (Chronic lymphedema) present.   Lymphadenopathy:      Cervical: No cervical adenopathy.   Skin:     General: Skin is warm and dry.      Findings: No rash.   Neurological:      General: No focal deficit present.      Mental Status: She is alert and oriented to person, place, and time.      Cranial Nerves: No cranial nerve deficit.      Sensory: No sensory deficit.      Motor: No weakness.      Coordination: Coordination normal.      Gait: Gait normal.      Deep Tendon Reflexes: Reflexes are normal and symmetric. Reflexes normal.   Psychiatric:         Mood and Affect: Mood normal.         Behavior: Behavior normal.         Thought Content: Thought content normal.         Judgment: Judgment normal.           RESULTS:  Cholesterol, Total   Date/Time Value Ref Range Status   06/21/2022 07:51  100 - 199 mg/dL Final     Triglycerides   Date/Time Value Ref Range Status   06/21/2022 07:51  0 - 149 mg/dL Final     HDL   Date/Time Value Ref Range Status   06/21/2022 07:51 AM 53 >39 mg/dL Final     LDL Calculated   Date/Time Value Ref Range Status   06/21/2022 07:51  (H) 0 - 99 mg/dL Final     Hemoglobin   Date/Time Value Ref Range Status   06/21/2022 07:51 AM 12.5 11.1 - 15.9 g/dL Final   04/27/2021 05:49 PM 11.0 (L) 11.5 - 15.4 g/dL Final   08/25/2020 06:44 AM 10.4 (L) 11.5 - 14.5 g/dL Final     Hematocrit   Date/Time Value Ref Range Status   04/27/2021 05:49 PM 35.2 34.8 - 46.1 % Final   08/25/2020 06:44 AM 30.8 (L) 35.0 - 43.0 % Final     HCT   Date/Time Value Ref Range Status   06/21/2022 07:51 AM 38.9 34.0 - 46.6 % Final     Platelet Count   Date/Time Value Ref Range Status   06/21/2022 07:51  150 - 450 x10E3/uL Final     Platelets   Date/Time Value Ref Range Status   02/28/2021 09:11  149 - 390 Thousands/uL Final   08/31/2016 02:52  150 - 379 x10E3/uL Final     TSH 3RD GENERATON   Date/Time Value Ref Range Status    04/08/2016 09:53 AM 0.771 0.450 - 4.500 uIU/mL Final     Comment:     Performed at: [facility], [address] [place] [address], , [place], [place], [id number], [id number]  MD:  [address] [place] [id number]       Free t4   Date/Time Value Ref Range Status   06/21/2022 07:51 AM 1.16 0.82 - 1.77 ng/dL Final     Sodium   Date/Time Value Ref Range Status   06/21/2022 07:51  134 - 144 mmol/L Final   08/25/2020 06:44  136 - 145 mmol/L Final     BUN   Date/Time Value Ref Range Status   06/21/2022 07:51 AM 13 6 - 24 mg/dL Final   08/25/2020 06:44 AM 9 7 - 25 mg/dL Final     Creatinine   Date/Time Value Ref Range Status   06/21/2022 07:51 AM 0.67 0.57 - 1.00 mg/dL Final   02/28/2021 09:11 PM 0.75 0.60 - 1.30 mg/dL Final     Comment:     Standardized to IDMS reference method   08/25/2020 06:44 AM 0.60 0.60 - 1.20 mg/dL Final      In chart    This note was created with voice recognition software.  Phonic, grammatical and spelling errors may be present within the note as a result.

## 2024-05-28 NOTE — PATIENT INSTRUCTIONS
General medical exam is good other than chronic lymphedema.  Please try using your albuterol inhaler prior to going up and down stairs multiple times.  Keep me informed if this is effective.  Have labs done for follow-up visit in 4 months.  Follow-up sooner as needed.    Wellness Visit for Adults   AMBULATORY CARE:   A wellness visit  is when you see your healthcare provider to get screened for health problems. Your healthcare provider will also give you advice on how to stay healthy. Write down your questions so you remember to ask them. Ask your healthcare provider how often you should have a wellness visit.  What happens at a wellness visit:  Your healthcare provider will ask about your health, and your family history of health problems. This includes high blood pressure, heart disease, and cancer. He or she will ask if you have symptoms that concern you, if you smoke, and about your mood. You may also be asked about your intake of medicines, supplements, food, and alcohol. Any of the following may be done:  Your weight  will be checked. Your height may also be checked so your body mass index (BMI) can be calculated. Your BMI shows if you are at a healthy weight.    Your blood pressure  and heart rate will be checked. Your temperature may also be checked.    Blood and urine tests  may be done. Blood tests may be done to check your cholesterol levels. Abnormal cholesterol levels increase your risk for heart disease and stroke. You may also need a blood or urine test to check for diabetes if you are at increased risk. Urine tests may be done to look for signs of an infection or kidney disease.    A physical exam  includes checking your heartbeat and lungs with a stethoscope. Your healthcare provider may also check your skin to look for sun damage.    Screening tests  may be recommended. A screening test is done to check for diseases that may not cause symptoms. The screening tests you may need depend on your age,  gender, family history, and lifestyle habits. For example, colorectal screening may be recommended if you are 50 years old or older.    Screening tests you need if you are a woman:   A Pap smear  is used to screen for cervical cancer. Pap smears are usually done every 3 to 5 years depending on your age. You may need them more often if you have had abnormal Pap smear test results in the past. Ask your healthcare provider how often you should have a Pap smear.    A mammogram  is an x-ray of your breasts to screen for breast cancer. Experts recommend mammograms every 2 years starting at age 50 years. You may need a mammogram at age 49 years or younger if you have an increased risk for breast cancer. Talk to your healthcare provider about when you should start having mammograms and how often you need them.    Vaccines you may need:   Get an influenza vaccine  every year. The influenza vaccine protects you from the flu. Several types of viruses cause the flu. The viruses change over time, so new vaccines are made each year.    Get a tetanus-diphtheria (Td) booster vaccine  every 10 years. This vaccine protects you against tetanus and diphtheria. Tetanus is a severe infection that may cause painful muscle spasms and lockjaw. Diphtheria is a severe bacterial infection that causes a thick covering in the back of your mouth and throat.    Get a human papillomavirus (HPV) vaccine  if you are female and aged 19 to 26 or male 19 to 21 and never received it. This vaccine protects you from HPV infection. HPV is the most common infection spread by sexual contact. HPV may also cause vaginal, penile, and anal cancers.    Get a pneumococcal vaccine  if you are aged 65 years or older. The pneumococcal vaccine is an injection given to protect you from pneumococcal disease. Pneumococcal disease is an infection caused by pneumococcal bacteria. The infection may cause pneumonia, meningitis, or an ear infection.    Get a shingles vaccine   if you are 60 or older, even if you have had shingles before. The shingles vaccine is an injection to protect you from the varicella-zoster virus. This is the same virus that causes chickenpox. Shingles is a painful rash that develops in people who had chickenpox or have been exposed to the virus.    How to eat healthy:  My Plate is a model for planning healthy meals. It shows the types and amounts of foods that should go on your plate. Fruits and vegetables make up about half of your plate, and grains and protein make up the other half. A serving of dairy is included on the side of your plate. The amount of calories and serving sizes you need depends on your age, gender, weight, and height. Examples of healthy foods are listed below:  Eat a variety of vegetables  such as dark green, red, and orange vegetables. You can also include canned vegetables low in sodium (salt) and frozen vegetables without added butter or sauces.    Eat a variety of fresh fruits , canned fruit in 100% juice, frozen fruit, and dried fruit.    Include whole grains.  At least half of the grains you eat should be whole grains. Examples include whole-wheat bread, wheat pasta, brown rice, and whole-grain cereals such as oatmeal.    Eat a variety of protein foods such as seafood (fish and shellfish), lean meat, and poultry without skin (turkey and chicken). Examples of lean meats include pork leg, shoulder, or tenderloin, and beef round, sirloin, tenderloin, and extra lean ground beef. Other protein foods include eggs and egg substitutes, beans, peas, soy products, nuts, and seeds.    Choose low-fat dairy products such as skim or 1% milk or low-fat yogurt, cheese, and cottage cheese.    Limit unhealthy fats  such as butter, hard margarine, and shortening.       Exercise:  Exercise at least 30 minutes per day on most days of the week. Some examples of exercise include walking, biking, dancing, and swimming. You can also fit in more physical  activity by taking the stairs instead of the elevator or parking farther away from stores. Include muscle strengthening activities 2 days each week. Regular exercise provides many health benefits. It helps you manage your weight, and decreases your risk for type 2 diabetes, heart disease, stroke, and high blood pressure. Exercise can also help improve your mood. Ask your healthcare provider about the best exercise plan for you.       General health and safety guidelines:   Do not smoke.  Nicotine and other chemicals in cigarettes and cigars can cause lung damage. Ask your healthcare provider for information if you currently smoke and need help to quit. E-cigarettes or smokeless tobacco still contain nicotine. Talk to your healthcare provider before you use these products.    Limit alcohol.  A drink of alcohol is 12 ounces of beer, 5 ounces of wine, or 1½ ounces of liquor.    Lose weight, if needed.  Being overweight increases your risk of certain health conditions. These include heart disease, high blood pressure, type 2 diabetes, and certain types of cancer.    Protect your skin.  Do not sunbathe or use tanning beds. Use sunscreen with a SPF 15 or higher. Apply sunscreen at least 15 minutes before you go outside. Reapply sunscreen every 2 hours. Wear protective clothing, hats, and sunglasses when you are outside.    Drive safely.  Always wear your seatbelt. Make sure everyone in your car wears a seatbelt. A seatbelt can save your life if you are in an accident. Do not use your cell phone when you are driving. This could distract you and cause an accident. Pull over if you need to make a call or send a text message.    Practice safe sex.  Use latex condoms if are sexually active and have more than one partner. Your healthcare provider may recommend screening tests for sexually transmitted infections (STIs).    Wear helmets, lifejackets, and protective gear.  Always wear a helmet when you ride a bike or motorcycle,  go skiing, or play sports that could cause a head injury. Wear protective equipment when you play sports. Wear a lifejacket when you are on a boat or doing water sports.    © Copyright [employer] 2023 Information is for End User's use only and may not be sold, redistributed or otherwise used for commercial purposes.  The above information is an  only. It is not intended as medical advice for individual conditions or treatments. Talk to your doctor, nurse or pharmacist before following any medical regimen to see if it is safe and effective for you.

## 2024-06-03 ENCOUNTER — HOSPITAL ENCOUNTER (OUTPATIENT)
Dept: GASTROENTEROLOGY | Facility: HOSPITAL | Age: 59
Setting detail: OUTPATIENT SURGERY
Discharge: HOME | End: 2024-06-03
Attending: COLON & RECTAL SURGERY
Payer: COMMERCIAL

## 2024-06-03 ENCOUNTER — ANESTHESIA EVENT (OUTPATIENT)
Dept: GASTROENTEROLOGY | Facility: HOSPITAL | Age: 59
End: 2024-06-03

## 2024-06-03 ENCOUNTER — ANESTHESIA (OUTPATIENT)
Dept: GASTROENTEROLOGY | Facility: HOSPITAL | Age: 59
End: 2024-06-03

## 2024-06-03 VITALS
TEMPERATURE: 97.6 F | HEIGHT: 71 IN | HEART RATE: 69 BPM | SYSTOLIC BLOOD PRESSURE: 126 MMHG | DIASTOLIC BLOOD PRESSURE: 58 MMHG | BODY MASS INDEX: 40 KG/M2 | WEIGHT: 285.72 LBS | RESPIRATION RATE: 20 BRPM | OXYGEN SATURATION: 98 %

## 2024-06-03 DIAGNOSIS — R06.09 DYSPNEA ON EXERTION: Primary | ICD-10-CM

## 2024-06-03 DIAGNOSIS — D12.3 ADENOMATOUS POLYP OF TRANSVERSE COLON: ICD-10-CM

## 2024-06-03 DIAGNOSIS — R94.31 ABNORMAL ECG: ICD-10-CM

## 2024-06-03 PROBLEM — E66.812 OBESITY, CLASS II, BMI 35-39.9: Status: ACTIVE | Noted: 2024-06-03

## 2024-06-03 PROBLEM — E66.9 OBESITY, CLASS II, BMI 35-39.9: Status: ACTIVE | Noted: 2024-06-03

## 2024-06-03 RX ORDER — LIDOCAINE HYDROCHLORIDE 10 MG/ML
0.5 INJECTION, SOLUTION EPIDURAL; INFILTRATION; INTRACAUDAL; PERINEURAL ONCE AS NEEDED
Status: CANCELLED | OUTPATIENT
Start: 2024-06-03

## 2024-06-03 RX ORDER — LIDOCAINE HYDROCHLORIDE 10 MG/ML
0.5 INJECTION, SOLUTION EPIDURAL; INFILTRATION; INTRACAUDAL; PERINEURAL ONCE AS NEEDED
Status: DISCONTINUED | OUTPATIENT
Start: 2024-06-03 | End: 2024-06-07 | Stop reason: HOSPADM

## 2024-06-03 RX ORDER — PROPOFOL 10 MG/ML
INJECTION, EMULSION INTRAVENOUS AS NEEDED
Status: DISCONTINUED | OUTPATIENT
Start: 2024-06-03 | End: 2024-06-03

## 2024-06-03 RX ORDER — SODIUM CHLORIDE, SODIUM LACTATE, POTASSIUM CHLORIDE, CALCIUM CHLORIDE 600; 310; 30; 20 MG/100ML; MG/100ML; MG/100ML; MG/100ML
50 INJECTION, SOLUTION INTRAVENOUS CONTINUOUS
Status: CANCELLED | OUTPATIENT
Start: 2024-06-03

## 2024-06-03 RX ORDER — LIDOCAINE HYDROCHLORIDE 20 MG/ML
INJECTION, SOLUTION EPIDURAL; INFILTRATION; INTRACAUDAL; PERINEURAL AS NEEDED
Status: DISCONTINUED | OUTPATIENT
Start: 2024-06-03 | End: 2024-06-03

## 2024-06-03 RX ORDER — SODIUM CHLORIDE, SODIUM LACTATE, POTASSIUM CHLORIDE, CALCIUM CHLORIDE 600; 310; 30; 20 MG/100ML; MG/100ML; MG/100ML; MG/100ML
50 INJECTION, SOLUTION INTRAVENOUS CONTINUOUS
Status: DISCONTINUED | OUTPATIENT
Start: 2024-06-03 | End: 2024-06-07 | Stop reason: HOSPADM

## 2024-06-03 RX ADMIN — PROPOFOL 20 MG: 10 INJECTION, EMULSION INTRAVENOUS at 12:06

## 2024-06-03 RX ADMIN — PROPOFOL 50 MG: 10 INJECTION, EMULSION INTRAVENOUS at 12:21

## 2024-06-03 RX ADMIN — PROPOFOL 30 MG: 10 INJECTION, EMULSION INTRAVENOUS at 12:41

## 2024-06-03 RX ADMIN — PROPOFOL 40 MG: 10 INJECTION, EMULSION INTRAVENOUS at 12:10

## 2024-06-03 RX ADMIN — PROPOFOL 20 MG: 10 INJECTION, EMULSION INTRAVENOUS at 12:14

## 2024-06-03 RX ADMIN — PROPOFOL 50 MG: 10 INJECTION, EMULSION INTRAVENOUS at 12:16

## 2024-06-03 RX ADMIN — PROPOFOL 80 MG: 10 INJECTION, EMULSION INTRAVENOUS at 12:26

## 2024-06-03 RX ADMIN — SODIUM CHLORIDE, SODIUM LACTATE, POTASSIUM CHLORIDE, AND CALCIUM CHLORIDE 50 ML/HR: .6; .31; .03; .02 INJECTION, SOLUTION INTRAVENOUS at 10:45

## 2024-06-03 RX ADMIN — PROPOFOL 70 MG: 10 INJECTION, EMULSION INTRAVENOUS at 12:07

## 2024-06-03 RX ADMIN — PROPOFOL 100 MG: 10 INJECTION, EMULSION INTRAVENOUS at 12:04

## 2024-06-03 RX ADMIN — PROPOFOL 30 MG: 10 INJECTION, EMULSION INTRAVENOUS at 12:33

## 2024-06-03 RX ADMIN — LIDOCAINE HYDROCHLORIDE 60 MG: 20 INJECTION, SOLUTION EPIDURAL; INFILTRATION; INTRACAUDAL; PERINEURAL at 12:04

## 2024-06-03 RX ADMIN — PROPOFOL 50 MG: 10 INJECTION, EMULSION INTRAVENOUS at 12:13

## 2024-06-03 RX ADMIN — PROPOFOL 30 MG: 10 INJECTION, EMULSION INTRAVENOUS at 12:28

## 2024-06-03 RX ADMIN — PROPOFOL 20 MG: 10 INJECTION, EMULSION INTRAVENOUS at 12:19

## 2024-06-03 RX ADMIN — PROPOFOL 20 MG: 10 INJECTION, EMULSION INTRAVENOUS at 12:39

## 2024-06-03 RX ADMIN — PROPOFOL 20 MG: 10 INJECTION, EMULSION INTRAVENOUS at 12:30

## 2024-06-03 RX ADMIN — PROPOFOL 20 MG: 10 INJECTION, EMULSION INTRAVENOUS at 12:36

## 2024-06-03 NOTE — ANESTHESIA POSTPROCEDURE EVALUATION
Post-Op Assessment Note    CV Status:  Stable  Pain Score: 0    Pain management: adequate       Mental Status:  Alert and awake   Hydration Status:  Euvolemic   PONV Controlled:  Controlled   Airway Patency:  Patent     Post Op Vitals Reviewed: Yes    No anethesia notable event occurred.    Staff: CRNA               /58 (06/03/24 1248)    Temp 97.6 °F (36.4 °C) (06/03/24 1248)    Pulse 76 (06/03/24 1248)   Resp (!) 11 (06/03/24 1248)    SpO2 98 % (06/03/24 1248)

## 2024-06-03 NOTE — ANESTHESIA PREPROCEDURE EVALUATION
Procedure:  COLONOSCOPY    Relevant Problems   CARDIO   (+) Dyspnea on exertion      MUSCULOSKELETAL   (+) Back pain, lumbosacral      NEURO/PSYCH   (+) Cluster headaches      PULMONARY   (+) Dyspnea on exertion      Other   (+) Obesity, Class II, BMI 35-39.9        Physical Exam    Airway    Mallampati score: III  TM Distance: >3 FB  Neck ROM: full     Dental   No notable dental hx     Cardiovascular      Pulmonary      Other Findings  post-pubertal.      Anesthesia Plan  ASA Score- 3     Anesthesia Type- IV sedation with anesthesia with ASA Monitors.         Additional Monitors:     Airway Plan:            Plan Factors-Exercise tolerance (METS): >4 METS.Exercise comment: Able to climb flight of stairs, albeit with dyspnea since COVID-19 infection, unchanged.    Chart reviewed.   Existing labs reviewed.     Patient is not a current smoker.  Patient did not smoke on day of surgery.            Induction- intravenous.    Postoperative Plan-     Perioperative Resuscitation Plan - Level 1 - Full Code.       Informed Consent- Anesthetic plan and risks discussed with patient.

## 2024-06-03 NOTE — INTERVAL H&P NOTE
H&P reviewed. After examining the patient I find no changes in the patients condition since the H&P had been written.    Vitals:    06/03/24 1037   BP: 119/57   Pulse: 79   Resp: 16   Temp: 98 °F (36.7 °C)   SpO2: 98%

## 2024-06-12 ENCOUNTER — HOSPITAL ENCOUNTER (OUTPATIENT)
Dept: NON INVASIVE DIAGNOSTICS | Facility: CLINIC | Age: 59
Discharge: HOME | End: 2024-06-12

## 2024-06-12 ENCOUNTER — HOSPITAL ENCOUNTER (OUTPATIENT)
Dept: NON INVASIVE DIAGNOSTICS | Facility: CLINIC | Age: 59
Discharge: HOME | End: 2024-06-12
Payer: COMMERCIAL

## 2024-06-12 VITALS
DIASTOLIC BLOOD PRESSURE: 94 MMHG | HEIGHT: 71 IN | OXYGEN SATURATION: 99 % | WEIGHT: 285 LBS | BODY MASS INDEX: 39.9 KG/M2 | SYSTOLIC BLOOD PRESSURE: 140 MMHG | HEART RATE: 71 BPM

## 2024-06-12 DIAGNOSIS — R94.31 ABNORMAL ECG: ICD-10-CM

## 2024-06-12 DIAGNOSIS — R06.09 DYSPNEA ON EXERTION: ICD-10-CM

## 2024-06-12 LAB
CHEST PAIN STATEMENT: NORMAL
MAX DIASTOLIC BP: 88 MMHG
MAX PREDICTED HEART RATE: 160 BPM
NUC STRESS DIASTOLIC VOLUME INDEX: 130 ML/M2
NUC STRESS EJECTION FRACTION: 51 %
NUC STRESS SYSTOLIC VOLUME INDEX: 67 ML/M2
PROTOCOL NAME: NORMAL
RATE PRESSURE PRODUCT: NORMAL
REASON FOR TERMINATION: NORMAL
SL CV REST NUCLEAR ISOTOPE DOSE: 15.73 MCI
SL CV STRESS NUCLEAR ISOTOPE DOSE: 47 MCI
SL CV STRESS RECOVERY BP: NORMAL MMHG
SL CV STRESS RECOVERY HR: 80 BPM
SL CV STRESS RECOVERY O2 SAT: 100 %
STRESS ANGINA INDEX: 0
STRESS BASELINE BP: NORMAL MMHG
STRESS BASELINE HR: 71 BPM
STRESS O2 SAT REST: 99 %
STRESS PEAK HR: 121 BPM
STRESS POST EXERCISE DUR MIN: 3 MIN
STRESS POST EXERCISE DUR SEC: 0 SEC
STRESS POST O2 SAT PEAK: 100 %
STRESS POST PEAK BP: 128 MMHG
STRESS POST PEAK HR: 121 BPM
STRESS POST PEAK SYSTOLIC BP: 144 MMHG
STRESS/REST PERFUSION RATIO: 0.98
TARGET HR FORMULA: NORMAL

## 2024-06-12 PROCEDURE — 93016 CV STRESS TEST SUPVJ ONLY: CPT | Performed by: INTERNAL MEDICINE

## 2024-06-12 PROCEDURE — 78452 HT MUSCLE IMAGE SPECT MULT: CPT | Performed by: INTERNAL MEDICINE

## 2024-06-12 PROCEDURE — 93017 CV STRESS TEST TRACING ONLY: CPT

## 2024-06-12 PROCEDURE — A9502 TC99M TETROFOSMIN: HCPCS

## 2024-06-12 PROCEDURE — 78452 HT MUSCLE IMAGE SPECT MULT: CPT

## 2024-06-12 PROCEDURE — 93018 CV STRESS TEST I&R ONLY: CPT | Performed by: INTERNAL MEDICINE

## 2024-06-12 RX ORDER — REGADENOSON 0.08 MG/ML
0.4 INJECTION, SOLUTION INTRAVENOUS ONCE
Status: COMPLETED | OUTPATIENT
Start: 2024-06-12 | End: 2024-06-12

## 2024-06-12 RX ADMIN — REGADENOSON 0.4 MG: 0.08 INJECTION, SOLUTION INTRAVENOUS at 09:03

## 2024-07-01 LAB
MISCELLANEOUS LAB TEST RESULT: NORMAL

## 2024-07-15 ENCOUNTER — EVALUATION (OUTPATIENT)
Dept: PHYSICAL THERAPY | Facility: CLINIC | Age: 59
End: 2024-07-15
Payer: COMMERCIAL

## 2024-07-15 DIAGNOSIS — I89.0 LYMPHEDEMA: ICD-10-CM

## 2024-07-15 PROCEDURE — 97110 THERAPEUTIC EXERCISES: CPT | Performed by: PHYSICAL THERAPIST

## 2024-07-15 PROCEDURE — 97161 PT EVAL LOW COMPLEX 20 MIN: CPT | Performed by: PHYSICAL THERAPIST

## 2024-07-15 NOTE — PROGRESS NOTES
PT Evaluation     Today's date: 7/15/2024  Patient name: Keesha Walker  : 1965  MRN: [id number]  Referring provider: Ozzie Silvestre PA-C  Dx:   Encounter Diagnosis     ICD-10-CM    1. Lymphedema  I89.0 Ambulatory Referral to PT/OT Lymphedema Therapy                     Assessment  Impairments: impaired physical strength, lacks appropriate home exercise program and pain with function  Other impairment: LE lymphedema    Assessment details: Keesha Walker is a 58 y.o. female presenting as an outpatient to [facility] PT w/ dx of b/l LE lymphedema.  Additionally, pt presents w/ pain, minor strength deficits and difficulty performing functional activities such as stair climbing.  Pt will benefit from skilled PT services in order to max function/safety and decrease edema to allow pt to achieve goals in PT. Thank you for the referral of this pt.      Understanding of Dx/Px/POC: good     Prognosis: good    Goals  ST.Pain decreased by 25% in 4 weeks.  2. Edema decreased by 2-5 cm in edematous areas in 4 weeks.   3. Pt will be independent w/ initial HEP in 1-2 visits.    LT. Decrease pain to 1-2/10 at worst by d/c.  2. Edema decreased by 5-10 cm in edematous areas by d/c.  3. Strength increased to 5 for all deficient muscle groups by d/c.  4. IADL performance increased to max function by d/c.  5. Recreational performance increased to max function by d/c.  6. Pt will negotiate stairs safely in reciprocating fashion by d/c.    Plan    Planned therapy interventions: home exercise program, manual therapy, neuromuscular re-education, patient education, strengthening, therapeutic exercise, therapeutic activities and massage  Other planned therapy interventions: other interventions PRN    Frequency: 1-2x/week.  Duration in weeks: 8  Plan of Care beginning date: 7/15/2024  Plan of Care expiration date: 2024  Treatment plan discussed with: patient        Subjective Evaluation    History of Present  Illness  Mechanism of injury: Pt reports to IE w/ dx of b/l LE lymphedema (L worse vs R) which began approximately 30 years ago of insidious onset w/ progressive worsening.  She states she has also had varicose veins since she was 9 y/o.     Pt reports having a couple of lymphedema massages approx 1 month ago which helped somewhat.     Pt has home compression pump - states that she used for approx 1 month straight x 1 hour at a time, but felt it was making varicose veins worse despite though having less swelling after the pump.    Pt has old compression garments at home, but does not wear.     Pt reports that she had a DVT approx 5 years ago which was treated w/ blood thinner.  She is no longer taking blood thinner.    Pt denies any numbness/parasthesias in b/l LE.  Patient Goals  Patient goals for therapy: decreased edema    Pain  Current pain ratin  At worst pain ratin  Location: proximal gastroc  Alleviating factors: elevation, massage, pump.    Social Support  Stairs in house: yes (performs STS fashion w/ rail)   Lives with: spouse and adult children    Employment status: not working (pt is retired)    Diagnostic Tests  Abnormal: Negative results for DVT as of 2023.      Objective     General Comments:      Ankle/Foot Comments   Observation: No observable erythema/wounds; Edema in b/l LE w/ circumferential girth measurements as below (L/R):     20 cm proximal to knee:  69 cm/67 cm  10 cm proximal to knee: 65 cm/66 cm  Knee Joint: 53 cm/54 cm  30 cm proximal to ankle: 60 cm /58 cm  20 cm proximal to ankle: 57 cm  /54 cm  10 cm proximal to ankle: 46 cm/45 cm  Ankle Joint: 37 cm/34.5 cm  Mid foot: 26 cm/26 cm    Hip Strength (L/R):   Flexion: 4/4+  Knee Flexion: 5/5   Knee Extension: 5/5  Ankle DF: 5/5  Ankle PF: 5/5    Palpation: Increased firmness along L posterior gastroc; tenderness b/l in shins      Daily Treatment Diary    Precautions: Hx of DVT (no longer on blood thinners); Hx of varicose  veins  Co- Morbidities:   Patient Active Problem List   Diagnosis    Thrombophlebitis of superficial veins of right lower extremity    Chronic venous insufficiency    Lymphedema    Back pain, lumbosacral    Varicose veins of legs    Cluster headaches    Asymptomatic postmenopausal status    Abnormal resting ECG findings    Carpal tunnel syndrome    Chronic peripheral venous hypertension    Degenerative joint disease of right hip    Radial styloid tenosynovitis    Osteoarthritis of acromioclavicular joint    Spider angioma    Status post hip surgery    Bleeding from varicose veins of lower extremity    Dyspnea on exertion    Arthralgia of right knee    Mass of upper outer quadrant of right breast    Obesity, Class II, BMI 35-39.9         EPOC: 9/9/24 7/15            RA DATE: 8/12/24             Manual                       MLD                                                                                                                        Exercise Diary 7/15                     THEREX             Pt Ed RB- HEP instruct/hndout and education on elevation, compression                         Recumbent Bike                       Ankle pumps             Gastrocs Stretch                       HR/TR                       HS curls             Marches             Active HS stretch w/ ankle pumps                                                    NEURO RE-ED                          Therapeutic Activity                       STS                                                                                                                                             Modalities

## 2024-07-17 ENCOUNTER — OFFICE VISIT (OUTPATIENT)
Dept: PHYSICAL THERAPY | Facility: CLINIC | Age: 59
End: 2024-07-17
Payer: COMMERCIAL

## 2024-07-17 DIAGNOSIS — I89.0 LYMPHEDEMA: Primary | ICD-10-CM

## 2024-07-17 PROCEDURE — 97140 MANUAL THERAPY 1/> REGIONS: CPT

## 2024-07-17 PROCEDURE — 97110 THERAPEUTIC EXERCISES: CPT

## 2024-07-17 NOTE — PROGRESS NOTES
Daily Note     Today's date: 2024  Patient name: Keesha Walker  : 1965  MRN: [id number]  Referring provider: Ozzie Silvestre PA-C  Dx:   Encounter Diagnosis     ICD-10-CM    1. Lymphedema  I89.0           Start Time:           Subjective: pt reports L > R leg swelling. Stated she doesn't like wearing compression thigh highs.       Objective: See treatment diary below      Assessment: Discussed causes of lymphedema, prognosis, treatment options, use of compression pump and compression garments. Pt does have compression pump at home but does not use it. Pt has compression garments that she does not use either due to them being really old. Discussed trying compression leggings . Discussed only using the pump 2-3x/week due to varicose veins getting worse when being used more. MLD to LLE improve lymphatic flow, decreased tightness, and decrease soreness. Tolerated treatment well. Patient would benefit from continued PT.       Plan: Continue per plan of care.      Daily Treatment Diary    Precautions: Hx of DVT (no longer on blood thinners); Hx of varicose veins  Co- Morbidities:   Patient Active Problem List   Diagnosis    Thrombophlebitis of superficial veins of right lower extremity    Chronic venous insufficiency    Lymphedema    Back pain, lumbosacral    Varicose veins of legs    Cluster headaches    Asymptomatic postmenopausal status    Abnormal resting ECG findings    Carpal tunnel syndrome    Chronic peripheral venous hypertension    Degenerative joint disease of right hip    Radial styloid tenosynovitis    Osteoarthritis of acromioclavicular joint    Spider angioma    Status post hip surgery    Bleeding from varicose veins of lower extremity    Dyspnea on exertion    Arthralgia of right knee    Mass of upper outer quadrant of right breast    Obesity, Class II, BMI 35-39.9         EPOC: 9/9/24 7/15 7/17           RA DATE: 24             Manual                       MLD    [facility]                                                                                                                     Exercise Diary 7/15  7/17                   THEREX             Pt Ed RB- HEP instruct/hndout and education on elevation, compression [facility]                        Recumbent Bike                       Ankle pumps             Gastrocs Stretch                       HR/TR                       HS curls             Marches             Active HS stretch w/ ankle pumps                                                    NEURO RE-ED                          Therapeutic Activity                       STS

## 2024-07-22 ENCOUNTER — OFFICE VISIT (OUTPATIENT)
Dept: PHYSICAL THERAPY | Facility: CLINIC | Age: 59
End: 2024-07-22
Payer: COMMERCIAL

## 2024-07-22 DIAGNOSIS — I89.0 LYMPHEDEMA: Primary | ICD-10-CM

## 2024-07-22 PROCEDURE — 97110 THERAPEUTIC EXERCISES: CPT

## 2024-07-22 PROCEDURE — 97140 MANUAL THERAPY 1/> REGIONS: CPT

## 2024-07-22 NOTE — PROGRESS NOTES
Daily Note     Today's date: 2024  Patient name: Keesha Walker  : 1965  MRN: [id number]  Referring provider: Ozzie Silvestre PA-C  Dx:   Encounter Diagnosis     ICD-10-CM    1. Lymphedema  I89.0                      Subjective: pt reports feeling better post session.       Objective: See treatment diary below      Assessment: Tolerated treatment well. Patient would benefit from continued PT. MLD to improve lymphatic flow and decrease swelling. Discussed compression garments. Pt notes she is still looking for some that will fit.       Plan: Continue per plan of care.      Daily Treatment Diary    Precautions: Hx of DVT (no longer on blood thinners); Hx of varicose veins  Co- Morbidities:   Patient Active Problem List   Diagnosis    Thrombophlebitis of superficial veins of right lower extremity    Chronic venous insufficiency    Lymphedema    Back pain, lumbosacral    Varicose veins of legs    Cluster headaches    Asymptomatic postmenopausal status    Abnormal resting ECG findings    Carpal tunnel syndrome    Chronic peripheral venous hypertension    Degenerative joint disease of right hip    Radial styloid tenosynovitis    Osteoarthritis of acromioclavicular joint    Spider angioma    Status post hip surgery    Bleeding from varicose veins of lower extremity    Dyspnea on exertion    Arthralgia of right knee    Mass of upper outer quadrant of right breast    Obesity, Class II, BMI 35-39.9         EPOC: 9/9/24 7/15 7/17 7/22          RA DATE: 24             Manual                       MLD    [facility]                                                                                                                  Exercise Diary 7/15  7/17  7/22                 THEREX             Pt Ed RB- HEP instruct/hndout and education on elevation, compression [facility]                       Recumbent Bike                       Ankle pumps             Gastrocs Stretch                       HR/TR                       HS  nichol Taylor             Active HS stretch w/ ankle pumps                                                    NEURO RE-ED                          Therapeutic Activity                       STS

## 2024-07-24 ENCOUNTER — OFFICE VISIT (OUTPATIENT)
Dept: PHYSICAL THERAPY | Facility: CLINIC | Age: 59
End: 2024-07-24
Payer: COMMERCIAL

## 2024-07-24 DIAGNOSIS — I89.0 LYMPHEDEMA: Primary | ICD-10-CM

## 2024-07-24 PROCEDURE — 97140 MANUAL THERAPY 1/> REGIONS: CPT

## 2024-07-24 PROCEDURE — 97110 THERAPEUTIC EXERCISES: CPT

## 2024-07-24 NOTE — PROGRESS NOTES
Daily Note     Today's date: 2024  Patient name: Keesha Walker  : 1965  MRN: [id number]  Referring provider: Ozzie Silvestre PA-C  Dx:   Encounter Diagnosis     ICD-10-CM    1. Lymphedema  I89.0           Start Time:   Stop Time: 1800  Total time in clinic (min): 45 minutes    Subjective: pt reports her shoes are fitting better.       Objective: See treatment diary below      Assessment: Tolerated treatment well. Patient would benefit from continued PT. Some pain behind the knee during MLD.       Plan: Continue per plan of care.      Daily Treatment Diary    Precautions: Hx of DVT (no longer on blood thinners); Hx of varicose veins  Co- Morbidities:   Patient Active Problem List   Diagnosis    Thrombophlebitis of superficial veins of right lower extremity    Chronic venous insufficiency    Lymphedema    Back pain, lumbosacral    Varicose veins of legs    Cluster headaches    Asymptomatic postmenopausal status    Abnormal resting ECG findings    Carpal tunnel syndrome    Chronic peripheral venous hypertension    Degenerative joint disease of right hip    Radial styloid tenosynovitis    Osteoarthritis of acromioclavicular joint    Spider angioma    Status post hip surgery    Bleeding from varicose veins of lower extremity    Dyspnea on exertion    Arthralgia of right knee    Mass of upper outer quadrant of right breast    Obesity, Class II, BMI 35-39.9         EPOC: 9/9/24 7/15 7/17 7/22 7/24         RA DATE: 24             Manual                       MLD    [facility]                                                                                                                  Exercise Diary 7/15  7/17  7/22  7/24               THEREX             Pt Ed RB- HEP instruct/hndout and education on elevation, compression [facility]                      Recumbent Bike                       Ankle pumps             Gastrocs Stretch                       HR/TR                       HS curls              Brandon             Active HS stretch w/ ankle pumps                                                    NEURO RE-ED                          Therapeutic Activity                       STS

## 2024-07-29 ENCOUNTER — APPOINTMENT (OUTPATIENT)
Dept: PHYSICAL THERAPY | Facility: CLINIC | Age: 59
End: 2024-07-29
Payer: COMMERCIAL

## 2024-07-31 ENCOUNTER — OFFICE VISIT (OUTPATIENT)
Dept: PHYSICAL THERAPY | Facility: CLINIC | Age: 59
End: 2024-07-31
Payer: COMMERCIAL

## 2024-07-31 DIAGNOSIS — I89.0 LYMPHEDEMA: Primary | ICD-10-CM

## 2024-07-31 PROCEDURE — 97110 THERAPEUTIC EXERCISES: CPT

## 2024-07-31 PROCEDURE — 97140 MANUAL THERAPY 1/> REGIONS: CPT

## 2024-07-31 NOTE — PROGRESS NOTES
Daily Note     Today's date: 2024  Patient name: Keesha Walker  : 1965  MRN: [id number]  Referring provider: Ozzie Silvestre PA-C  Dx:   Encounter Diagnosis     ICD-10-CM    1. Lymphedema  I89.0                      Subjective: pt reports no new major complaints upon arrival.       Objective: See treatment diary below      Assessment: Tolerated treatment well. Patient would benefit from continued PT. Educated pt about continue use of compression pump. MLD to improve lymphatic flow.       Plan: Continue per plan of care.      Daily Treatment Diary    Precautions: Hx of DVT (no longer on blood thinners); Hx of varicose veins  Co- Morbidities:   Patient Active Problem List   Diagnosis    Thrombophlebitis of superficial veins of right lower extremity    Chronic venous insufficiency    Lymphedema    Back pain, lumbosacral    Varicose veins of legs    Cluster headaches    Asymptomatic postmenopausal status    Abnormal resting ECG findings    Carpal tunnel syndrome    Chronic peripheral venous hypertension    Degenerative joint disease of right hip    Radial styloid tenosynovitis    Osteoarthritis of acromioclavicular joint    Spider angioma    Status post hip surgery    Bleeding from varicose veins of lower extremity    Dyspnea on exertion    Arthralgia of right knee    Mass of upper outer quadrant of right breast    Obesity, Class II, BMI 35-39.9         EPOC: 9/9/24 7/15 7/17 7/22 7/24 7/31        RA DATE: 24             Manual                       MLD    [facility]                                                                                                              Exercise Diary 7/15  7/17  7/22  7/24  7/31             THEREX             Pt Ed RB- HEP instruct/hndout and education on elevation, compression [facility]                     Recumbent Bike                       Ankle pumps             Gastrocs Stretch                       HR/TR                       HS curls              Brandon             Active HS stretch w/ ankle pumps                                                    NEURO RE-ED                          Therapeutic Activity                       STS

## 2024-08-07 ENCOUNTER — TELEPHONE (OUTPATIENT)
Age: 59
End: 2024-08-07

## 2024-08-07 ENCOUNTER — APPOINTMENT (OUTPATIENT)
Dept: PHYSICAL THERAPY | Facility: CLINIC | Age: 59
End: 2024-08-07
Payer: COMMERCIAL

## 2024-08-07 NOTE — TELEPHONE ENCOUNTER
Patients GI provider:  Dr. Escobar    Number to return call: [phone]    Reason for call: Pt calling stating she is getting a bill from her colonoscopy on 6/3/24. Insurance co, advised it was coded wrong.  Transferred Pt to Billing and provided number.      Scheduled procedure/appointment date if applicable: 6/3/24

## 2024-08-14 ENCOUNTER — OFFICE VISIT (OUTPATIENT)
Dept: PHYSICAL THERAPY | Facility: CLINIC | Age: 59
End: 2024-08-14
Payer: COMMERCIAL

## 2024-08-14 DIAGNOSIS — I89.0 LYMPHEDEMA: Primary | ICD-10-CM

## 2024-08-14 PROCEDURE — 97110 THERAPEUTIC EXERCISES: CPT

## 2024-08-14 PROCEDURE — 97140 MANUAL THERAPY 1/> REGIONS: CPT

## 2024-08-14 NOTE — PROGRESS NOTES
Daily Note     Today's date: 2024  Patient name: Keesha Walker  : 1965  MRN: [id number]  Referring provider: Ozzie Silvestre PA-C  Dx:   Encounter Diagnosis     ICD-10-CM    1. Lymphedema  I89.0                      Subjective: pt reports legs are feeling swollen due to traveling.       Objective: See treatment diary below      Assessment: Tolerated treatment well. Patient would benefit from continued PT.      Plan: Continue per plan of care.      Daily Treatment Diary    Precautions: Hx of DVT (no longer on blood thinners); Hx of varicose veins  Co- Morbidities:   Patient Active Problem List   Diagnosis    Thrombophlebitis of superficial veins of right lower extremity    Chronic venous insufficiency    Lymphedema    Back pain, lumbosacral    Varicose veins of legs    Cluster headaches    Asymptomatic postmenopausal status    Abnormal resting ECG findings    Carpal tunnel syndrome    Chronic peripheral venous hypertension    Degenerative joint disease of right hip    Radial styloid tenosynovitis    Osteoarthritis of acromioclavicular joint    Spider angioma    Status post hip surgery    Bleeding from varicose veins of lower extremity    Dyspnea on exertion    Arthralgia of right knee    Mass of upper outer quadrant of right breast    Obesity, Class II, BMI 35-39.9         EPOC: 9/9/24 7/15 7/17 7/22 7/24 7/31 8/14       RA DATE: 24             Manual                       MLD    [facility]                                                                                                            Exercise Diary 7/15  7/17  7/22  7/24  7/31  8/14           THEREX             Pt Ed RB- HEP instruct/hndout and education on elevation, compression [facility]                    Recumbent Bike                       Ankle pumps             Gastrocs Stretch                       HR/TR                       HS curls             Marches             Active HS stretch w/ ankle pumps                                                     NEURO RE-ED                          Therapeutic Activity                       STS

## 2024-08-19 ENCOUNTER — EVALUATION (OUTPATIENT)
Dept: PHYSICAL THERAPY | Facility: CLINIC | Age: 59
End: 2024-08-19
Payer: COMMERCIAL

## 2024-08-19 DIAGNOSIS — I89.0 LYMPHEDEMA: Primary | ICD-10-CM

## 2024-08-19 PROCEDURE — 97140 MANUAL THERAPY 1/> REGIONS: CPT

## 2024-08-19 PROCEDURE — 97110 THERAPEUTIC EXERCISES: CPT

## 2024-08-19 NOTE — PROGRESS NOTES
PT Re-Evaluation     Collection of Subjective/Objective data performed by Juanis White PTA. Assessment, POC, and Goal attainment performed by Stormy Kiran DPT.       Today's date: 2024  Patient name: Keesha Walker  : 1965  MRN: [id number]  Referring provider: Ozzie Silvester PA-C  Dx:   Encounter Diagnosis     ICD-10-CM    1. Lymphedema  I89.0             Start Time: 1230  Stop Time: 1315  Total time in clinic (min): 45 minutes    Assessment  Impairments: impaired physical strength, lacks appropriate home exercise program and pain with function  Other impairment: LE lymphedema    Assessment details: Patient is a 59 y/o female who presents to therapy with b/l LE lymphedema and has completed 7 visits to date. Patient demonstrates subjective/objective improvement since starting PT such as improved girth measurements. Patient will benefit from continued skilled PT intervention to address the aforementioned impairments, achieve goals, maximize function, and improve quality of life. Pt is in agreement with this plan.         Understanding of Dx/Px/POC: good     Prognosis: good    Goals  ST.Pain decreased by 25% in 4 weeks.--MET  2. Edema decreased by 2-5 cm in edematous areas in 4 weeks. --MET  3. Pt will be independent w/ initial HEP in 1-2 visits.-Ongoing    LT. Decrease pain to 1-2/10 at worst by d/c.--MET  2. Edema decreased by 5-10 cm in edematous areas by d/c.progressing  3. Strength increased to 5 for all deficient muscle groups by d/c.--progressing  4. IADL performance increased to max function by d/c.--progressing  5. Recreational performance increased to max function by d/c.--progressing  6. Pt will negotiate stairs safely in reciprocating fashion by d/c.--progressing    Plan    Planned therapy interventions: home exercise program, manual therapy, neuromuscular re-education, patient education, strengthening, therapeutic exercise, therapeutic activities and massage  Other  planned therapy interventions: other interventions PRN    Frequency: 1x week  Duration in weeks: 8  Plan of Care beginning date: 7/15/2024  Plan of Care expiration date: 2024  Treatment plan discussed with: patient        Subjective Evaluation    History of Present Illness  Mechanism of injury: RE (24)  Pt has been seen for 7 visits to date w/ dx of b/l LE lymphedema and notes overall improvements. Pt stated she is doing her pump 2x/week but has not looked for or purchased compression garments. Pt stated her pain has decreased substantially but still feels swollen depending on what she does.     EVAL (7/15/24)  Pt reports to IE w/ dx of b/l LE lymphedema (L worse vs R) which began approximately 30 years ago of insidious onset w/ progressive worsening.  She states she has also had varicose veins since she was 11 y/o.     Pt reports having a couple of lymphedema massages approx 1 month ago which helped somewhat.     Pt has home compression pump - states that she used for approx 1 month straight x 1 hour at a time, but felt it was making varicose veins worse despite though having less swelling after the pump.    Pt has old compression garments at home, but does not wear.     Pt reports that she had a DVT approx 5 years ago which was treated w/ blood thinner.  She is no longer taking blood thinner.    Pt denies any numbness/parasthesias in b/l LE.  Patient Goals  Patient goals for therapy: decreased edema    Pain  Current pain ratin  At worst pain ratin  Location: proximal gastroc  Alleviating factors: elevation, massage, pump.    Social Support  Stairs in house: yes (performs STS fashion w/ rail)   Lives with: spouse and adult children    Employment status: not working (pt is retired)    Diagnostic Tests  Abnormal: Negative results for DVT as of 2023.      Objective     General Comments:      Ankle/Foot Comments   Observation: No observable erythema/wounds; Edema in b/l LE w/ circumferential girth  measurements as below (L/R):     20 cm proximal to knee:  68 cm/66 cm  10 cm proximal to knee: 63 cm/61 cm  Knee Joint: 48 cm/52 cm  30 cm proximal to ankle: 57 cm /55 cm  20 cm proximal to ankle: 53 cm  /48 cm  10 cm proximal to ankle: 42 cm/39 cm  Ankle Joint: 34 cm/33 cm  Mid foot: 24 cm/23.5 cm    Hip Strength (L/R):   Flexion: 4/4+  Knee Flexion: 5/5   Knee Extension: 5/5  Ankle DF: 5/5  Ankle PF: 5/5    Palpation: Increased firmness along L posterior gastroc; tenderness b/l in shins    Daily Treatment Diary    Precautions: Hx of DVT (no longer on blood thinners); Hx of varicose veins  Co- Morbidities:   Patient Active Problem List   Diagnosis    Thrombophlebitis of superficial veins of right lower extremity    Chronic venous insufficiency    Lymphedema    Back pain, lumbosacral    Varicose veins of legs    Cluster headaches    Asymptomatic postmenopausal status    Abnormal resting ECG findings    Carpal tunnel syndrome    Chronic peripheral venous hypertension    Degenerative joint disease of right hip    Radial styloid tenosynovitis    Osteoarthritis of acromioclavicular joint    Spider angioma    Status post hip surgery    Bleeding from varicose veins of lower extremity    Dyspnea on exertion    Arthralgia of right knee    Mass of upper outer quadrant of right breast    Obesity, Class II, BMI 35-39.9         EPOC: 9/9/24 7/15 7/17 7/22 7/24 7/31 8/14 8/19      RA DATE: 8/12/24             Manual                       MLD    [facility]                                                                                                          Exercise Diary 7/15  7/17  7/22  7/24  7/31  8/14  8/19         THEREX             Pt Ed RB- HEP instruct/hndout and education on elevation, compression [facility]; updated measurements                   Recumbent Bike                       Ankle pumps             Gastrocs Stretch                       HR/TR                       HS curls              Brandon             Active HS stretch w/ ankle pumps                                                    NEURO RE-ED                          Therapeutic Activity                       STS                                                                                                                                             Flowsheet Rows      Flowsheet Row Most Recent Value   PT/OT G-Codes    Current Score 46   Projected Score 53

## 2024-08-26 ENCOUNTER — APPOINTMENT (OUTPATIENT)
Dept: PHYSICAL THERAPY | Facility: CLINIC | Age: 59
End: 2024-08-26
Payer: COMMERCIAL

## 2024-08-28 ENCOUNTER — OFFICE VISIT (OUTPATIENT)
Dept: PHYSICAL THERAPY | Facility: CLINIC | Age: 59
End: 2024-08-28
Payer: COMMERCIAL

## 2024-08-28 DIAGNOSIS — I89.0 LYMPHEDEMA: Primary | ICD-10-CM

## 2024-08-28 PROCEDURE — 97140 MANUAL THERAPY 1/> REGIONS: CPT

## 2024-08-28 PROCEDURE — 97110 THERAPEUTIC EXERCISES: CPT

## 2024-08-28 NOTE — PROGRESS NOTES
Daily Note/Discharge Note    Today's date: 2024  Patient name: Keesha Walker  : 1965  MRN: [id number]  Referring provider: Ozzie Silvestre PA-C  Dx:   Encounter Diagnosis     ICD-10-CM    1. Lymphedema  I89.0                      Subjective: pt reports she has been using her pump more during the week. Stated she feels she can manage on her own with the pump for now.       Objective: See treatment diary below      Assessment: Discussed continued use of compression pump to help maintain swelling and pain in the legs. Discussed continued exercise and elevation as needed. Pt is to come back in a few months if sx return.       Plan: D/c from PT at this time.        Daily Treatment Diary    Precautions: Hx of DVT (no longer on blood thinners); Hx of varicose veins  Co- Morbidities:   Patient Active Problem List   Diagnosis    Thrombophlebitis of superficial veins of right lower extremity    Chronic venous insufficiency    Lymphedema    Back pain, lumbosacral    Varicose veins of legs    Cluster headaches    Asymptomatic postmenopausal status    Abnormal resting ECG findings    Carpal tunnel syndrome    Chronic peripheral venous hypertension    Degenerative joint disease of right hip    Radial styloid tenosynovitis    Osteoarthritis of acromioclavicular joint    Spider angioma    Status post hip surgery    Bleeding from varicose veins of lower extremity    Dyspnea on exertion    Arthralgia of right knee    Mass of upper outer quadrant of right breast    Obesity, Class II, BMI 35-39.9         EPOC: 9/9/24 7/15 7/17 7/22 7/24 7/31 8/14 8/19 8/28     RA DATE: 24             Manual                       MLD    [facility]                                                                                                        Exercise Diary 7/15  7/17  7/22  7/24  7/31  8/14  8/19  8/28       THEREX             Pt Ed RB- HEP instruct/hndout and education on elevation, compression [facility]  FOTO; updated measurements JH                  Recumbent Bike                       Ankle pumps             Gastrocs Stretch                       HR/TR                       HS curls             Marches             Active HS stretch w/ ankle pumps                                                    NEURO RE-ED                          Therapeutic Activity                       STS

## 2024-09-23 ENCOUNTER — TELEPHONE (OUTPATIENT)
Age: 59
End: 2024-09-23

## 2024-09-23 NOTE — TELEPHONE ENCOUNTER
ADD ON  9/25 @11:30 virtually with Dr. Gore    Pt was originally scheduled for 9/27 @ 1:30 with Dr. Gore in  but pt can not make that appt.   R/s appt to virtual 9/25 @ 11:30.

## 2024-09-25 ENCOUNTER — TELEMEDICINE (OUTPATIENT)
Dept: NEUROLOGY | Facility: CLINIC | Age: 59
End: 2024-09-25
Payer: COMMERCIAL

## 2024-09-25 DIAGNOSIS — G44.029 CHRONIC CLUSTER HEADACHE, NOT INTRACTABLE: Primary | ICD-10-CM

## 2024-09-25 PROCEDURE — 99214 OFFICE O/P EST MOD 30 MIN: CPT | Performed by: PSYCHIATRY & NEUROLOGY

## 2024-09-25 RX ORDER — VERAPAMIL HYDROCHLORIDE 120 MG/1
120 TABLET, FILM COATED, EXTENDED RELEASE ORAL
Qty: 90 TABLET | Refills: 4 | Status: SHIPPED | OUTPATIENT
Start: 2024-09-25

## 2024-09-25 RX ORDER — ELETRIPTAN HYDROBROMIDE 40 MG/1
TABLET, FILM COATED ORAL
Qty: 27 TABLET | Refills: 3 | Status: SHIPPED | OUTPATIENT
Start: 2024-09-25

## 2024-09-25 NOTE — PROGRESS NOTES
Review of Systems   Constitutional:  Negative for appetite change and fever.   HENT: Negative.  Negative for hearing loss, tinnitus, trouble swallowing and voice change.    Eyes: Negative.  Negative for photophobia and pain.   Respiratory: Negative.  Negative for shortness of breath.    Cardiovascular: Negative.  Negative for palpitations.   Gastrointestinal: Negative.  Negative for nausea and vomiting.   Endocrine: Negative.  Negative for cold intolerance.   Genitourinary: Negative.  Negative for dysuria, frequency and urgency.   Musculoskeletal: Negative.  Negative for myalgias and neck pain.   Skin: Negative.  Negative for rash.   Neurological: Negative.  Negative for dizziness, tremors, seizures, syncope, facial asymmetry, speech difficulty, weakness, light-headedness, numbness and headaches.   Hematological: Negative.  Does not bruise/bleed easily.   Psychiatric/Behavioral: Negative.  Negative for confusion, hallucinations and sleep disturbance.

## 2024-09-25 NOTE — PROGRESS NOTES
Virtual Regular Visit  Name: Keesha Walker      : 1965      MRN: [id number]  Encounter Provider: Dianne Gore MD  Encounter Date: 2024   Encounter department: NEUROLOGY [facility]    Verification of patient location:    Patient is located at Home in the following state in which I hold an active license [place]    Assessment & Plan  Chronic cluster headache, not intractable    Orders:    verapamil (CALAN-SR) 120 mg CR tablet; Take 1 tablet (120 mg total) by mouth daily at bedtime    eletriptan (RELPAX) 40 MG tablet; 1 tab po at migraine onset, may repeat in 2 hours if needed. Max 24 hours, 27 per 3 month.        Encounter provider Dianne Gore MD    The patient was identified by name and date of birth. Keesha Walker was informed that this is a telemedicine visit and that the visit is being conducted through the Epic Embedded platform. She agrees to proceed..  My office door was closed. No one else was in the room.  She acknowledged consent and understanding of privacy and security of the video platform. The patient has agreed to participate and understands they can discontinue the visit at any time.    Patient is aware this is a billable service.     History of Present Illness       Assessment/Plan:   Keesha Walker is a delightful 59 y.o. female with a past medical history that includes cluster headaches, chronic venous insufficiency, lymphedema, chronic back pain on and off, hip arthritis, obesity referred here for evaluation of headache.  She has followed with my neurology colleagues in the past  My initial visit 2020     Chronic cluster headache, not intractable  Suspected sleep apnea-she will let me know if she is agreeable to sleep study at any time  Patient reports a long history of chronic headaches since her 30s.  She reports her headaches, in clusters typically daily for 3 months at a time and occur in the evenings.  They can last anywhere from 15 minutes up to  an hour and may happen 3 times at night and 2 times before bed.  They tend to build until they reach maximal intensity and then resolve.  She reports may start with right teeth pain and then become right frontal and right retro-orbital stabbing pain although the most recent episode was mid frontal sharp electrical.  Pain is severe.  She reports associated features include photophobia, photophobia, right eye lacrimation, nasal congestion.   - as of 1/24/2020: 3 months at a time will have daily headaches  - As of 05/29/2020: no headaches since last time, weaned herself off of lyrica and verapamil and just on amitriptyline 50 mg nightly and doing well. Computer read of recent EKG showed likely incorrect abnormalities since she was told it was normal, just want her PCP to look over it to make sure this is the case while on amitriptyline.   - As of 9/11/2020:  No headaches since last visit, accidentally stop taking amitriptyline 50 mg nightly for 1 week and will restart, refilled today.  We discussed plan for next time she has a cluster headache cycle - plan to start emgality - she will call in right went cycle starts.   - as of 2/26/2021:  She has not had significant headache in the past year on amitriptyline 50 mg nightly.  - as of 8/4/2022: after years of doing very well, cluster returned mid July. Started emgality 300 mg monthly for cluster headache 7/27/22 which helped a few days and may help more so continue. Decadron has not helped. Will add depakote and if needed also verapamil, both of which she has tolerated in the past for cluster. Now just weighing rapidly increasing meds vs possible side effects of BP dropping, she has cuff at home. Also recommended sleep study as TOBIAS may be contributing.   - as of 8/18/2022: She reports no improvement and worsening when she tried depakote 1000 mg for 5 nights, verapamil has already helped (along with continuing emgality 300 mg monthly, amitriptyline 50 mg) and will continue  titration up from 120 mg BID where she had 4 days no headaches, last 2 days 1 cluster headache each day that resolves with triptan. Titration gradually discussed if needed up to 120 mg TID with room to go higher if needed and no SE.   - as of 9/1/2022:  She reports improvement since last visit on current medication combination with no cluster headaches the last 3 days  On amitriptyline 50 mg, emgality 300 mg monthly, verapamil 120 mg t.i.d. and we discussed room to gradually increase verapamil if headaches return as long as blood pressure tolerates.  -as of 09/22/2022 she increased verapamil to 160/120/240 starting 09/12/2022 (and stopped the daily eletripan she had been taking once she realized this can make it worse) and has had significant improvement with last headache cluster 09/17/2022.  We discussed I highly suspect sleep apnea triggering these typically nighttime cluster headaches, so may be hard to completely get out of the cycle until this is evaluated and treated.  In the meantime will continue current dose of verapamil, but if she remains controlled at current dose can discontinue the extra 40 mg in a.m. once she runs out in a few weeks.  For now continue amitriptyline 50 mg nightly, but did not prevent this cluster and I tend to wean people off this at later years due to potential side effects so we will likely wean this off 1st after next visit and continue verapamil for prevention.  For now continue emgality, but can  and hold next dose and see if headaches come back first before giving as it is unclear what has made her lymphedema worse.  - as of 1/27/2023: No headaches since last visit, a few months ago she weaned herself down to verapamil 120 mg at night (will change to ER) and amitriptyline 25 mg at night, will wean off since did not prevent last episode.  - as of 10/26/2023: She reports doing well with no cluster headaches since last visit and we looked back it seems none since just over a  year ago 2022.  She is taking verapamil 120 mg at night and denies any bothersome side effects.  The backup Emgality was thrown out due to .  She has a new shot she gets episodically for arthritic knee pain which is helping her cut back on the daily meloxicam/Mobic.  Did not end up going for sleep study which we discussed in depth I still highly recommended discussed the morbidity and mortality of has sleep apnea and remains untreated. I would reorder for her if she needs me to, but I have never ordered the one Dr. Morton did on 22, either way this is something that is very important.   - as of 2024: She reports she continues to do very well with no cluster headaches since 2022 and she mentions she thinks this is related to the nasal septoplasty she had for her nosebleeds around that time and we discussed it appears she did have an episode at least 1 after this surgery but certainly possibly related.  We discussed I still highly suspect she has underlying sleep apnea contributing to other symptoms even without bothersome headaches and she would prefer to hold off on home sleep study at this time due to expense and will let me know if she changes her mind before next year visit and I would be happy to order between visits as high clinical suspicion.  In the meantime continue to not sleep on her back sleep more propped up and do not drive if feeling sleepy which she does not.  She would like to continue verapamil 120 mg nightly as did not take the chance of possible cluster headaches coming back and I completely agree as her blood pressure also could continue to benefit and she denies any bothersome side effects. She was able to wean off amitriptyline.    Workup:  - sleep study ordered by Pulm 22 and scheduled for 23, she has to reschedule  -MRI brain with and without contrast 2020:  Per radiology read no acute disease.  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  No  orbital pathology. *As of my retrospective review 10/26/2023 she has partially empty sella although only slightly so, prominent optic nerve sheath bilaterally and optic nerve tortuosity, cerebellar tonsils overlie the foramen magnum with pointed tip with tight junction bilaterally without Chiari officially     Preventative:  - we discussed headache hygiene and lifestyle factors that may improve headaches  -     verapamil (CALAN-SR) 120 mg CR tablet; Take 1 tablet (120 mg total) by mouth daily at bedtime. Discussed proper use, possible side effects and risks.  -  back up: emgality - SubQ: 300 mg at the onset of the cluster period and then once monthly until the end of the cluster period.- no longer has at home  - through other providers: (euflexxa 1 shot weekly for 3 weeks for knee pain/arthritis every 6 months to a year when knee needs it due to unable to due surgery due lymphedema), meloxicam/mobic on average 4 times a week, trying to not take it daily since shot helps , lymph system support [facility] - considering starting a 3 month trial)  - past/failed:  Carbamazepine, Depakote 500 mg and 1000 mg did not help in fact made headaches worse, topiramate, gabapentin, metoprolol, lyrica, verapamil, oxcarbazepine, , amitriptyline  -Future options: Acetazolamide/Diamox     Abortive:  - discussed not taking over-the-counter or prescription pain medications more than 3 days per week to prevent medication overuse/rebound headache  -     eletriptan (RELPAX) 40 MG tablet; 1 tab po at migraine onset, may repeat in 2 hours if needed. Max 2/24 hours, 27 per 3 month. Discussed proper use, possible side effects and risks.  -past:  Steroids helped in the past but lately has not helped, sumatriptan p.o. Nasal helped for a little while and was too expensive, Depakote 500 mg and 1000 mg did not help in fact made headaches worse, rizatriptan she believes was helpful but too expensive - typically the pain starts too fast and ends  before she is able to take something           Patient instructions        As we discussed I highly highly recommend sleep study as others have recommended as well.  It sounds like you currently are planning on holding off on sleep study but again encouraged you to obtain which I encouraged you to do today due to the significant morbidity and mortality if this remains untreated as well as the relationship to cluster headaches.  Certainly if the cluster headaches return I would recommend that even more strongly.      Headache/migraine treatment:   Abortive medications (for immediate treatment of a headache):   It is ok to take ibuprofen, acetaminophen or naproxen (Advil, Tylenol,  Aleve, Excedrin) if they help your headaches you should limit these to No more than 3 times a week to avoid medication overuse/rebound headaches.      For your more moderate to severe migraines take this medication early   Eletriptan 40mg tabs - take one at the onset of headache. May repeat one time after 1-2 hours if pain has not resolved.    Max 2 a day      Prescription preventive medications for headaches/migraines   (to take every day to help prevent headaches - not to take at the time of headache):  [x]     verapamil 120 mg nightly ER    *Typically these types of medications take time untill you see the benefit, although some may see improvement in days, often it may take weeks, especially if the medication is being titrated up to a beneficial level. Please contact us if there are any concerns or questions regarding the medication.         Lifestyle Recommendations:  [x] SLEEP - Maintain a regular sleep schedule: Adults need at least 7-8 hours of uninterrupted a night. Maintain good sleep hygiene:  Going to bed and waking up at consistent times, avoiding excessive daytime naps, avoiding caffeinated beverages in the evening, avoid excessive stimulation in the evening and generally using bed primarily for sleeping.  One hour before  bedtime would recommend turning lights down lower, decreasing your activity (may read quietly, listen to music at a low volume). When you get into bed, should eliminate all technology (no texting, emailing, playing with your phone, iPad or tablet in bed).  [x] HYDRATION - Maintain good hydration.  Drink  2L of fluid a day (4 typical small water bottles)  [x] DIET - Maintain good nutrition. In particular don't skip meals and try and eat healthy balanced meals regularly.  [x] TRIGGERS - Look for other triggers and avoid them: Limit caffeine to 1-2 cups a day or less. Avoid dietary triggers that you have noticed bring on your headaches (this could include aged cheese, peanuts, MSG, aspartame and nitrates).  [x] EXERCISE - physical exercise as we all know is good for you in many ways, and not only is good for your heart, but also is beneficial for your mental health, cognitive health and  chronic pain/headaches. I would encourage at the least 5 days of physical exercise weekly for at least 30 minutes.      Education and Follow-up  [x] Please call with any questions or concerns.   Of course if any new concerning symptoms go to the emergency department.  - Follow up 1 year, sooner if needed        CC:   We had the pleasure of evaluating Keesha Walker in neurological consultation today. Keesha Walker is a   right handed female who presents today for evaluation of headaches.      History obtained from patient as well as available medical record review.  History of Present Illness:   Interval history as of 9/25/2024  - no significant new or concerning neurologic symptoms since last visit   -Please see EMR for details of events over the last year, from my brief review for my reference she followed up with colorectal surgery for history of polyps, pulmonary for dyspnea on exertion emphysema cigarette dependence in remission, had a colonoscopy in December, CT lung screening in December, ER for foot injury in January,  "followed up with PCP for verruca, had her mammogram and followed up with colorectal surgery again for another colonoscopy in June, had cardiac imaging in June for dyspnea on exertion as well as stress test and subsequently has been following with physical therapy for lymphedema - has not helped yet Keesha feels but an inch better, 50 bucks a visit and find massage more comprehensive  - per MA - Keesha hasn't had any cluster headaches in the past year so she is doing good.   - COVID 2020, stopped smoking, breathing was great and then got the cardiac testing just to make sure that was not abnormal and contributing to breathing issues  - sleep midnight to 9 am , up 4 times a night, back and forth btw side and belly due to hip, cat comes in every hour, snores, sleep is not always restful, fatigue, avg 5 hours, no dream acting out, more propped at night     Headaches and migraines   No cluster headaches since around 9/17/22    Preventative:   - Verapamil 120 mg at night  - emgality thrown away   - through other providers: (euflexxa 1 shot weekly for 3 weeks for knee pain/arthritis every 6 months to a year when knee needs it due to unable to due surgery due lymphedema), meloxicam/mobic on average 4 times a week, trying to not take it daily since shot helps , lymph system support [facility] - considering starting a 3 month trial)     Abortive:   Eletriptan works if needed  Denies bothersome side effects        Interval history as of 10/26/2023  - no significant new or concerning neurologic symptoms since last visit   - Had a breast excisional biopsy 6/1/2023 and everything turned out okay thankfully  - ENT follow up 9/19/23 and he was wondering when the major epistaxis   - did not go in for the sleep study, still having breathing issues and feels \"ok\" - I would still recommend it     Headaches and migraines   No cluster headaches since around 9/17/22    Preventative:   - Verapamil 120 mg at night  - emgality thrown away "   - through other providers: euflexxa 1 shot weekly for 3 weeks for knee pain/arthritis every 6 months to a year,  meloxicam/mobic trying to not take it daily since shot helps    Abortive:   Eletriptan works   Denies bothersome side effects       Interval history as of 1/27/2023  - denies any new or concerning neurologic symptoms since last visit   - has not followed up with sleep yet     Headaches and migraines   No headaches since last visit     Preventative:   - Verapamil 120 mg at night, slowly weaned down over the past months and been here two months  - amitriptyline 50 mg to 25 mg a few months ago   - emgality has in fridge for if needed next time     Abortive:   Eletriptan works   Denies bothersome side effects       Interval history as of 9/22/2022  - denies any new or concerning neurologic symptoms since last visit    - max 3 hours in a row, wakes every hour to look at clock     Headaches and migraines   1 week no headache, then 9/17/22 was the last headache - multiple     Preventative:   verapamil 160/120/240 started 9/12/22 -no issues with blood pressure, blood pressure is perfect at this dose, denies constipation  amitriptyline 50 mg nightly  emgality 300 mg monthly  - did it twice and believes it helped and then nothing     Abortive: relpax - was taking daily for a while and stopped 9/12/22  Denies bothersome side effects     Interval history as of 9/1/2022  - denies any new or concerning neurologic symptoms since last visit     Headaches and migraines   - when she got off the computer with me two weeks ago got a headache, one day without, then slight headache, then last 3 nights has not had any  - she called 8/29/22 because her lymphedema was worse     Preventative:   amitriptyline 50 mg nightly  emgality 300 mg monthly    verapamil - 160/120/120    Abortive: eletriptan  Denies bothersome side effects    Interval history as of 8/18/2022  - denies any new or concerning neurologic symptoms since last  visit   - sleep study ordered by Pulm 8/5/22 and scheduled for 1/20/23  - able to work through this, doesn't need work note     Headaches and migraines   stuck in her cluster headache cycle, but seems to be having some improvement    While taking the depakote on 1000 mg headaches were worse after 5 nights  Then started verapamil and then on day 5 and 6 took 120 mg BID and no headaches for 4 days, then 8/16/22 and 8/17/22 1 headache each day that improved with relpax     Preventative:    - next emgality 300 mg dose should be by 8/27/22  - trial of depakote 1-2 tabs nightly - made worse  - amitriptyline 50 mg nightly   - trial of re adding verapamil up to 120 mg BID helps     Blood pressure not dropping, no SE    Abortive:   - relpax /eletriptan   Denies bothersome side effects     Interval history as of 8/4/2022  - she was to follow up in a year and is now following up 1.5 years later due to return of headaches  - retiring at the end of the year and nervous about it     Headaches and migraines    - ran out of amitriptyline mid May due to pharmacy issue and missed one week and started to feel like she may have headaches again 5/18/22 but then didn't get them  - 7/24/22 msg reporting headaches for the past week, I received the message from staff 2 days later and called her and we added emgality 300 mg monthly for cluster  - 8/2/22 called reporting headaches continue after improving for 2-3 days, decadron added and on 3rd day   - still having headaches daily now, about 2 times a day, lasting about 20 minutes, not as bad as previous years, still crying at the end and trying not to since it makes it feel worse, pressure helps on the region  - right temporal and jaw and behind the eye and less so in the teeth, no vision changes    Preventative:   - emgality 300 mg monthly 7/27/22 - belly no issues   - amitriptyline 50 mg nightly - ran out mid May due to pharmacy issue and missed one week and started to have headaches again  5/18/22    Abortive:   - eletriptan/relpax  - hydrocodone for back sometimes - not taking   Denies bothersome side effects     -----------------   - seeing pulmonary tomorrow, up every 2 hours, snores   How likely a you to doze off or fall sleep during the following situations?  0 - would never doze  1 - slight chance of dozing  2 - moderate chance of dosing  3 - high chance of dozing    [place] sleepiness scale - 11  Sitting and reading - 2  Watching TV - 3  Sitting, inactive in a public place such as a theater 0  As a passenger in a car for an hour without a break -0   Lying down to rest in afternoon when circumstances permit - 3  Sitting and talking to someone - 0  Sitting quietly after lunch without alcohol 3  In a car while stopped for few minutes in traffic - 0      Interval history as of 02/26/2021   - dx COVID last weds   - has not smoked in a year     She reports she has not had a significant headache or migraine in over a year   (she had these headaches about 10 years and thinks menopaused ended a few years ago, but we discussed they may have been hormonally related and maybe she is done with them now)  Preventative: Amitriptyline 50 mg nightly  Abortive:  Eletriptan     Interval history as of 9/11/2020:  - just had hip surgery in 8/2020  - forgot to take amitriptyline 50 mg for past week - will restart  - no headaches since we last talked         Interval history as of 5/29/2020:   -  MRI brain with without contrast 02/07/2020: No acute disease.  Punctate pontine capillary telangiectasia.   No orbital pathology.     Headaches -none      She called in 03/10/2020 wanted to decrease Lyrica on verapamil, Amitriptyline-attempted to wean off but patient feel better on 25 mg nightly.     Lyrica - weaned herself off  Verapamil - weaned herself off  Amitriptyline - 50 mg and doing great, sleeping wonderful 10-8      relpax not needed     Headaches started at what age? Late 30s years old  How often do the headaches  occur?   - as of 1/24/2020: 3 months at a time headaches daily, none in 6 weeks   - As of 05/29/2020: no headaches since last time, weaned herself off of lyrica and verapamil and just on amitriptyline 50 mg nightly and doing well   What time of the day do the headaches start? only at night,  most of the time during sleep or before she sleeps at night  How long do the headaches last? 15 mins to an hour - 3 times at night and 2 times before bed - worsens until it gets to a point where it blows up and then goes away   Are you ever headache free? Yes     Aura? without aura     Where is your headache located and pain quality?   - starts with right teeth pain and feels it coming   - usually right frontal and right retroorbital - stabbing  - midfrontal lately - sharp, electtical  What is the intensity of pain? Average: 15/10  Associated symptoms:   [] Nausea       [] Vomiting        [] Diarrhea  [x] Insomnia    [] Stiff or sore neck   [x] Problems with concentration  [x] Photophobia     [x]Phonophobia      [] Osmophobia  [] Blurred vision   [x] Prefer quiet, dark room  [] Light-headed or dizzy     [] Tinnitus    [] Hands or feet tingle or feel numb/paresthesias       [] Red ear      [] Ptosis      [] Facial droop  [x] Lacrimation - right tearing  [x] Nasal congestion  [] Flushing of face  [] Change in pupil size     Number of days missed per month because of headaches:  Work (or school) days: 0     Things that make the headache worse? No specific movements, any movement      Headache triggers:  Unknown   What time of the year do headaches occur more frequently?   are usually worse winter but has also had in July     Have you seen someone else for headaches or pain? Yes, Dr. Kerns   Have you had trigger point injection performed and how often? No  Have you had Botox injection performed and how often? No   Have you had epidural injections or transforaminal injections performed? No  Are you current pregnant or planning on  getting pregnant? No   Have you ever had any Brain imaging? Yes multiple normal      What medications do you take or have you taken for your headaches?   ABORTIVE:    OTC medications have been ineffective         Relpax - 12/month - if takes before bed able to sleep for 4 hours -      Has norco for back once a year when back goes out      Past:  Steroids helped in the past, but last couple bathes lasted a day only  Sumatriptan PO and nasal helped for a little   Rizatriptan - was helpful and too expensive      PREVENTIVE:      - amitriptyline 25 mg - been on for years - up to 50 in cycle   For hip pain: meloxicam      Past:  - Verapamil 240 mg daily (in past 240 +120)   - pregabalin 75 mg daily- started 1 week before being 6 weeks headache free        Past/failed:   carbamazepine - did not help  depakote did not help   topiramate 100 mg over 3 months   Gabapentin helped in the past and not recently  Metoprolol        LIFESTYLE  Sleep   - averages: normally sleeps well     Physical activity: none      Water: 0 per day  Caffeine: 8 cups per day  Diet:  overeating     Mood:   Denies history of anxiety or depression or other diagnosed mood disorder     The following portions of the patient's history were reviewed and updated as appropriate: allergies, current medications, past family history, past medical history, past social history, past surgical history and problem list.     Pertinent family history:  Family history of headaches:  Migraines in daughter, sister and mother  Any family history of aneurysms - No     Pertinent social history:  Work: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  Education: 12th   Lives with      Illicit Drugs: denies  Alcohol/tobacco: quiting tobacco - 5 cigs improvement, alcohol no       Pertinent lab results:   See EMR for recent labs  06/21/2022 TSH and free T4 normal    CMP and CBC unremarkable except for slightly elevated alkaline phosphatase  01/28/2020 CMP significant for sodium 147, B12 260     10/29/2019 CMP and CBC unremarkable, TSH normal     06/08/2016 B12 283     Imaging:   -MRI brain with and without contrast 2/7/2020:  Per radiology read no acute disease.  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  No orbital pathology. *As of my retrospective review 10/26/2023 she has partially empty sella although only slightly so, prominent optic nerve sheath bilaterally and optic nerve tortuosity, cerebellar tonsils overlie the foramen magnum with pointed tip with tight junction bilaterally without Chiari officially     MRA head 01/21/2015:  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.  No evidence of intracranial aneurysm, AVM, cortical cerebral arterial stenosis or occlusion  - follow up CT scan was normal, no cyst             Objective     LMP 01/01/2015     Objective:     Exam limited by Video  Physical Exam:                                                                 Vitals:            Constitutional:    [facility] 01/01/2015   BP Readings from Last 3 Encounters:   06/12/24 140/94   06/03/24 126/58   05/28/24 114/72     Pulse Readings from Last 3 Encounters:   06/12/24 71   06/03/24 69   05/28/24 80         Well developed, well nourished, No dysmorphic features.         Normocephalic atraumatic.     Normal behavior and appropriate affect        Able to answer questions appropriately, provide history of recent events   Normal language and spontaneous speech.  facial expression symmetric  symmetric bulk throughout. no atrophy, fasciculations or significant abnormal movements noted during our visit from observation.        Review of Systems:   ROS obtained by medical assistant and reviewed, but if any symptoms listed below say negative, does not mean patient has not had this symptom since last visit, please see HPI for details of symptoms discussed this visit.  Regarding any abnormal or positive findings in ROS that are not neurologic related, patient instructed to address  these issues with PCP or go to the ER if they are severe.    Review of Systems   Constitutional:  Negative for appetite change and fever.   HENT: Negative.  Negative for hearing loss, tinnitus, trouble swallowing and voice change.    Eyes: Negative.  Negative for photophobia and pain.   Respiratory: Negative.  Negative for shortness of breath.    Cardiovascular: Negative.  Negative for palpitations.   Gastrointestinal: Negative.  Negative for nausea and vomiting.   Endocrine: Negative.  Negative for cold intolerance.   Genitourinary: Negative.  Negative for dysuria, frequency and urgency.   Musculoskeletal: Negative.  Negative for myalgias and neck pain.   Skin: Negative.  Negative for rash.   Neurological: Negative.  Negative for dizziness, tremors, seizures, syncope, facial asymmetry, speech difficulty, weakness, light-headedness, numbness and headaches.   Hematological: Negative.  Does not bruise/bleed easily.   Psychiatric/Behavioral: Negative.  Negative for confusion, hallucinations and sleep disturbance.            I have spent 20 minutes with Patient today in which greater than 50% of this time was spent in counseling/coordination of care regarding Prognosis, Risks and benefits of tx options, Instructions for management, Patient education, Importance of tx compliance, Risk factor reductions, Impressions, Counseling / Coordination of care, Documenting in the medical record, Obtaining or reviewing history  , and Communicating with other healthcare professionals . I also spent 11 minutes non face to face for this patient the same day.           Visit Time  Total Visit Duration: 31

## 2024-09-25 NOTE — PATIENT INSTRUCTIONS
As we discussed I highly highly recommend sleep study as others have recommended as well.  It sounds like you currently are planning on holding off on sleep study but again encouraged you to obtain which I encouraged you to do today due to the significant morbidity and mortality if this remains untreated as well as the relationship to cluster headaches.  Certainly if the cluster headaches return I would recommend that even more strongly.      Headache/migraine treatment:   Abortive medications (for immediate treatment of a headache):   It is ok to take ibuprofen, acetaminophen or naproxen (Advil, Tylenol,  Aleve, Excedrin) if they help your headaches you should limit these to No more than 3 times a week to avoid medication overuse/rebound headaches.      For your more moderate to severe migraines take this medication early   Eletriptan 40mg tabs - take one at the onset of headache. May repeat one time after 1-2 hours if pain has not resolved.    Max 2 a day      Prescription preventive medications for headaches/migraines   (to take every day to help prevent headaches - not to take at the time of headache):  [x]     verapamil 120 mg nightly ER    *Typically these types of medications take time untill you see the benefit, although some may see improvement in days, often it may take weeks, especially if the medication is being titrated up to a beneficial level. Please contact us if there are any concerns or questions regarding the medication.         Lifestyle Recommendations:  [x] SLEEP - Maintain a regular sleep schedule: Adults need at least 7-8 hours of uninterrupted a night. Maintain good sleep hygiene:  Going to bed and waking up at consistent times, avoiding excessive daytime naps, avoiding caffeinated beverages in the evening, avoid excessive stimulation in the evening and generally using bed primarily for sleeping.  One hour before bedtime would recommend turning lights down lower, decreasing your activity  (may read quietly, listen to music at a low volume). When you get into bed, should eliminate all technology (no texting, emailing, playing with your phone, iPad or tablet in bed).  [x] HYDRATION - Maintain good hydration.  Drink  2L of fluid a day (4 typical small water bottles)  [x] DIET - Maintain good nutrition. In particular don't skip meals and try and eat healthy balanced meals regularly.  [x] TRIGGERS - Look for other triggers and avoid them: Limit caffeine to 1-2 cups a day or less. Avoid dietary triggers that you have noticed bring on your headaches (this could include aged cheese, peanuts, MSG, aspartame and nitrates).  [x] EXERCISE - physical exercise as we all know is good for you in many ways, and not only is good for your heart, but also is beneficial for your mental health, cognitive health and  chronic pain/headaches. I would encourage at the least 5 days of physical exercise weekly for at least 30 minutes.      Education and Follow-up  [x] Please call with any questions or concerns.   Of course if any new concerning symptoms go to the emergency department.  - Follow up 1 year, sooner if needed

## 2024-09-25 NOTE — ASSESSMENT & PLAN NOTE
Orders:    verapamil (CALAN-SR) 120 mg CR tablet; Take 1 tablet (120 mg total) by mouth daily at bedtime    eletriptan (RELPAX) 40 MG tablet; 1 tab po at migraine onset, may repeat in 2 hours if needed. Max 2/24 hours, 27 per 3 month.

## 2024-09-26 ENCOUNTER — TELEPHONE (OUTPATIENT)
Age: 59
End: 2024-09-26

## 2024-09-26 NOTE — TELEPHONE ENCOUNTER
Patients GI provider:  Dr. DEL VALLE    Number to return call: ([phone]    Reason for call: Pt calling office regarding 6/3/2024 Colonoscopy. She states it was coded incorrectly, and now she received a large bill.Please return patients call to further discuss.

## 2025-03-11 ENCOUNTER — OFFICE VISIT (OUTPATIENT)
Dept: INTERNAL MEDICINE CLINIC | Facility: CLINIC | Age: 60
End: 2025-03-11
Payer: COMMERCIAL

## 2025-03-11 VITALS
WEIGHT: 285 LBS | SYSTOLIC BLOOD PRESSURE: 126 MMHG | HEIGHT: 71 IN | DIASTOLIC BLOOD PRESSURE: 78 MMHG | HEART RATE: 82 BPM | BODY MASS INDEX: 39.9 KG/M2 | RESPIRATION RATE: 20 BRPM | OXYGEN SATURATION: 98 %

## 2025-03-11 DIAGNOSIS — D23.30 CYST, DERMOID, FACE: Primary | ICD-10-CM

## 2025-03-11 PROCEDURE — 99213 OFFICE O/P EST LOW 20 MIN: CPT | Performed by: PHYSICIAN ASSISTANT

## 2025-03-11 NOTE — PATIENT INSTRUCTIONS
Use topical warm moist compresses to cystic area on right side of forehead twice daily for 20 minutes at a time.  Send me a picture via [employer] in 2 weeks.  Anticipate a referral to plastic surgery for removal.

## 2025-03-11 NOTE — PROGRESS NOTES
"Name: Keesha Walker      : 1965      MRN: [id number]  Encounter Provider: Ozzie Silvestre PA-C  Encounter Date: 3/11/2025   Encounter department: [facility] [place]  :  Assessment & Plan  Cyst, dermoid, face               Depression Screening and Follow-up Plan: Patient was screened for depression during today's encounter. They screened negative with a PHQ-2 score of 0.        History of Present Illness   Acute visit    Patient has developed a swollen area above her right eyebrow.  She initially thought it was a \"pimple\", and tried to pop it by squeezing it.  Although it seemed to do was make it bigger and start to itch.  She states the area seems to change color from red, blue, and sometimes green.  Not particularly painful.      Review of Systems   Constitutional:  Negative for activity change, chills, fatigue and fever.   HENT:  Negative for congestion.    Eyes:  Negative for discharge.   Respiratory:  Negative for cough, chest tightness and shortness of breath.    Cardiovascular:  Negative for chest pain, palpitations and leg swelling.   Gastrointestinal:  Negative for abdominal pain.   Genitourinary:  Negative for difficulty urinating.   Musculoskeletal:  Negative for arthralgias and myalgias.   Skin:  Negative for rash.   Allergic/Immunologic: Negative for immunocompromised state.   Neurological:  Negative for dizziness, syncope, weakness, light-headedness and headaches.   Hematological:  Negative for adenopathy. Does not bruise/bleed easily.   Psychiatric/Behavioral:  Negative for dysphoric mood. The patient is not nervous/anxious.        Objective   /78 (BP Location: Left arm, Patient Position: Sitting, Cuff Size: Adult)   Pulse 82   Resp 20   Ht 5' 11\" (1.803 m)   Wt 129 kg (285 lb)   LMP 2015   SpO2 98%   BMI 39.75 kg/m²      Physical Exam  Constitutional:       General: She is not in acute distress.     Appearance: Normal appearance.   HENT:      Head: " Normocephalic.   Eyes:      Pupils: Pupils are equal, round, and reactive to light.   Cardiovascular:      Rate and Rhythm: Normal rate.   Pulmonary:      Effort: Pulmonary effort is normal. No respiratory distress.   Musculoskeletal:         General: No swelling.      Right lower leg: No edema.      Left lower leg: No edema.   Skin:     General: Skin is warm and dry.      Findings: No rash.      Comments: 5 mm indurated, slightly red, raised, cystic type lesion just above right medial eyebrow.  Slightly fluctuant, nontender.  No signs of infection.   Neurological:      General: No focal deficit present.      Mental Status: She is alert and oriented to person, place, and time. Mental status is at baseline.   Psychiatric:         Mood and Affect: Mood normal.         Behavior: Behavior normal.

## 2025-05-03 LAB
ALBUMIN SERPL-MCNC: 4 G/DL (ref 3.9–4.9)
ALP SERPL-CCNC: 121 IU/L (ref 44–121)
ALT SERPL-CCNC: 25 IU/L (ref 0–32)
AST SERPL-CCNC: 21 IU/L (ref 0–40)
BASOPHILS # BLD AUTO: 0 X10E3/UL (ref 0–0.2)
BASOPHILS NFR BLD AUTO: 1 %
BILIRUB SERPL-MCNC: 0.3 MG/DL (ref 0–1.2)
BUN SERPL-MCNC: 14 MG/DL (ref 8–27)
BUN/CREAT SERPL: 25 (ref 12–28)
CALCIUM SERPL-MCNC: 9.1 MG/DL (ref 8.7–10.3)
CHLORIDE SERPL-SCNC: 105 MMOL/L (ref 96–106)
CHOLEST SERPL-MCNC: 178 MG/DL (ref 100–199)
CO2 SERPL-SCNC: 24 MMOL/L (ref 20–29)
CREAT SERPL-MCNC: 0.55 MG/DL (ref 0.57–1)
EGFR: 104 ML/MIN/1.73
EOSINOPHIL # BLD AUTO: 0 X10E3/UL (ref 0–0.4)
EOSINOPHIL NFR BLD AUTO: 1 %
ERYTHROCYTE [DISTWIDTH] IN BLOOD BY AUTOMATED COUNT: 14.3 % (ref 11.7–15.4)
GLOBULIN SER-MCNC: 2.6 G/DL (ref 1.5–4.5)
GLUCOSE SERPL-MCNC: 95 MG/DL (ref 70–99)
HCT VFR BLD AUTO: 37.3 % (ref 34–46.6)
HDLC SERPL-MCNC: 58 MG/DL
HGB BLD-MCNC: 12.1 G/DL (ref 11.1–15.9)
IMM GRANULOCYTES # BLD: 0 X10E3/UL (ref 0–0.1)
IMM GRANULOCYTES NFR BLD: 0 %
LDLC SERPL CALC-MCNC: 102 MG/DL (ref 0–99)
LYMPHOCYTES # BLD AUTO: 1 X10E3/UL (ref 0.7–3.1)
LYMPHOCYTES NFR BLD AUTO: 22 %
MCH RBC QN AUTO: 30.1 PG (ref 26.6–33)
MCHC RBC AUTO-ENTMCNC: 32.4 G/DL (ref 31.5–35.7)
MCV RBC AUTO: 93 FL (ref 79–97)
MONOCYTES # BLD AUTO: 0.4 X10E3/UL (ref 0.1–0.9)
MONOCYTES NFR BLD AUTO: 9 %
NEUTROPHILS # BLD AUTO: 2.9 X10E3/UL (ref 1.4–7)
NEUTROPHILS NFR BLD AUTO: 67 %
PLATELET # BLD AUTO: 233 X10E3/UL (ref 150–450)
POTASSIUM SERPL-SCNC: 4.8 MMOL/L (ref 3.5–5.2)
PROT SERPL-MCNC: 6.6 G/DL (ref 6–8.5)
RBC # BLD AUTO: 4.02 X10E6/UL (ref 3.77–5.28)
SL AMB VLDL CHOLESTEROL CALC: 18 MG/DL (ref 5–40)
SODIUM SERPL-SCNC: 143 MMOL/L (ref 134–144)
TRIGL SERPL-MCNC: 102 MG/DL (ref 0–149)
WBC # BLD AUTO: 4.3 X10E3/UL (ref 3.4–10.8)

## 2025-05-04 ENCOUNTER — RESULTS FOLLOW-UP (OUTPATIENT)
Dept: INTERNAL MEDICINE CLINIC | Facility: CLINIC | Age: 60
End: 2025-05-04

## 2025-07-01 ENCOUNTER — OFFICE VISIT (OUTPATIENT)
Dept: INTERNAL MEDICINE CLINIC | Facility: CLINIC | Age: 60
End: 2025-07-01
Payer: COMMERCIAL

## 2025-07-01 VITALS
TEMPERATURE: 98 F | HEIGHT: 71 IN | RESPIRATION RATE: 18 BRPM | HEART RATE: 95 BPM | WEIGHT: 293 LBS | DIASTOLIC BLOOD PRESSURE: 70 MMHG | SYSTOLIC BLOOD PRESSURE: 126 MMHG | BODY MASS INDEX: 41.02 KG/M2 | OXYGEN SATURATION: 99 %

## 2025-07-01 DIAGNOSIS — I87.309 CHRONIC PERIPHERAL VENOUS HYPERTENSION: ICD-10-CM

## 2025-07-01 DIAGNOSIS — Z13.6 SCREENING FOR HEART DISEASE: ICD-10-CM

## 2025-07-01 DIAGNOSIS — I89.0 LYMPHEDEMA: ICD-10-CM

## 2025-07-01 DIAGNOSIS — I87.2 CHRONIC VENOUS INSUFFICIENCY: ICD-10-CM

## 2025-07-01 DIAGNOSIS — Z00.00 ANNUAL PHYSICAL EXAM: Primary | ICD-10-CM

## 2025-07-01 DIAGNOSIS — M16.11 PRIMARY OSTEOARTHRITIS OF RIGHT HIP: ICD-10-CM

## 2025-07-01 PROCEDURE — 99396 PREV VISIT EST AGE 40-64: CPT | Performed by: PHYSICIAN ASSISTANT

## 2025-07-01 NOTE — PATIENT INSTRUCTIONS
"Other than chronic lymphedema, general medical exam is acceptable.  Continue follow-up with specialist.  Plan follow-up here in 6 months with repeat labs for that visit, follow-up sooner as needed.              Patient Education     Routine physical for adults   The Basics   Written by the doctors and editors at [facility]   What is a physical? -- A physical is a routine visit, or \"check-up,\" with your doctor. You might also hear it called a \"wellness visit\" or \"preventive visit.\"  During each visit, the doctor will:   Ask about your physical and mental health   Ask about your habits, behaviors, and lifestyle   Do an exam   Give you vaccines if needed   Talk to you about any medicines you take   Give advice about your health   Answer your questions  Getting regular check-ups is an important part of taking care of your health. It can help your doctor find and treat any problems you have. But it's also important for preventing health problems.  A routine physical is different from a \"sick visit.\" A sick visit is when you see a doctor because of a health concern or problem. Since physicals are scheduled ahead of time, you can think about what you want to ask the doctor.  How often should I get a physical? -- It depends on your age and health. In general, for people age 21 years and older:   If you are younger than 50 years, you might be able to get a physical every 3 years.   If you are 50 years or older, your doctor might recommend a physical every year.  If you have an ongoing health condition, like diabetes or high blood pressure, your doctor will probably want to see you more often.  What happens during a physical? -- In general, each visit will include:   Physical exam - The doctor or nurse will check your height, weight, heart rate, and blood pressure. They will also look at your eyes and ears. They will ask about how you are feeling and whether you have any symptoms that bother you.   Medicines - It's a good idea " "to bring a list of all the medicines you take to each doctor visit. Your doctor will talk to you about your medicines and answer any questions. Tell them if you are having any side effects that bother you. You should also tell them if you are having trouble paying for any of your medicines.   Habits and behaviors - This includes:   Your diet   Your exercise habits   Whether you smoke, drink alcohol, or use drugs   Whether you are sexually active   Whether you feel safe at home  Your doctor will talk to you about things you can do to improve your health and lower your risk of health problems. They will also offer help and support. For example, if you want to quit smoking, they can give you advice and might prescribe medicines. If you want to improve your diet or get more physical activity, they can help you with this, too.   Lab tests, if needed - The tests you get will depend on your age and situation. For example, your doctor might want to check your:   Cholesterol   Blood sugar   Iron level   Vaccines - The recommended vaccines will depend on your age, health, and what vaccines you already had. Vaccines are very important because they can prevent certain serious or deadly infections.   Discussion of screening - \"Screening\" means checking for diseases or other health problems before they cause symptoms. Your doctor can recommend screening based on your age, risk, and preferences. This might include tests to check for:   Cancer, such as breast, prostate, cervical, ovarian, colorectal, prostate, lung, or skin cancer   Sexually transmitted infections, such as chlamydia and gonorrhea   Mental health conditions like depression and anxiety  Your doctor will talk to you about the different types of screening tests. They can help you decide which screenings to have. They can also explain what the results might mean.   Answering questions - The physical is a good time to ask the doctor or nurse questions about your health. " If needed, they can refer you to other doctors or specialists, too.  Adults older than 65 years often need other care, too. As you get older, your doctor will talk to you about:   How to prevent falling at home   Hearing or vision tests   Memory testing   How to take your medicines safely   Making sure that you have the help and support you need at home  All topics are updated as new evidence becomes available and our peer review process is complete.  This topic retrieved from [employer] on: May 02, 2024.  Topic 769398 Version 1.0  Release: 32.4.3 - C32.122  © 2024 UpToDate, Inc. and/or its affiliates. All rights reserved.  Consumer Information Use and Disclaimer   Disclaimer: This generalized information is a limited summary of diagnosis, treatment, and/or medication information. It is not meant to be comprehensive and should be used as a tool to help the user understand and/or assess potential diagnostic and treatment options. It does NOT include all information about conditions, treatments, medications, side effects, or risks that may apply to a specific patient. It is not intended to be medical advice or a substitute for the medical advice, diagnosis, or treatment of a health care provider based on the health care provider's examination and assessment of a patient's specific and unique circumstances. Patients must speak with a health care provider for complete information about their health, medical questions, and treatment options, including any risks or benefits regarding use of medications. This information does not endorse any treatments or medications as safe, effective, or approved for treating a specific patient. UpToDate, Inc. and its affiliates disclaim any warranty or liability relating to this information or the use thereof.The use of this information is governed by the Terms of Use, available at https://www.woltersxAduwer.com/en/know/clinical-effectiveness-terms. 2024© UpToDate, Inc. and its affiliates  and/or licensors. All rights reserved.  Copyright   © 2024 [employer] and/or its affiliates. All rights reserved.

## 2025-07-01 NOTE — PROGRESS NOTES
Adult Annual Physical  Name: Keesha Walker      : 1965      MRN: [id number]  Encounter Provider: Ozzie Silvestre PA-C  Encounter Date: 2025   Encounter department: [facility] [place]    :  Assessment & Plan  Annual physical exam               Preventive Screenings:  - Diabetes Screening: screening up-to-date  - Cholesterol Screening: screening up-to-date   - Hepatitis C screening: screening up-to-date   - Cervical cancer screening: screening up-to-date   - Breast cancer screening: screening up-to-date   - Colon cancer screening: screening up-to-date   - Lung cancer screening: screening not indicated     Immunizations:  - Immunizations due: Prevnar 20 and Zoster (Shingrix)  - Risks/benefits immunizations discussed        Depression Screening and Follow-up Plan: Patient was screened for depression during today's encounter. They screened negative with a PHQ-2 score of 0.          History of Present Illness     Adult Annual Physical:  Patient presents for annual physical. 59-year-old female presents for her annual physical.  Other than her chronic lymphedema, feels well.  Chronic lymphedema causes difficulty getting around.  Labs reviewed with patient today.  Patient did describe some morning nausea.  She will give trial of famotidine at bedtime.  Also some intermittent constipation for which she will give trial of magnesium.    EMR has been reviewed, clarified, and updated with patient today..     Diet and Physical Activity:  - Diet/Nutrition: no special diet, limited junk food and consuming 3-5 servings of fruits/vegetables daily.  - Exercise: no formal exercise, 1-2 times a week on average and 1-2 hours on average. Swimming in the summer months    Depression Screening:  - PHQ-2 Score: 0    General Health:  - Sleep: sleeps poorly and 4-6 hours of sleep on average.  - Hearing: normal hearing bilateral ears.  - Vision: no vision problems, most recent eye exam < 1 year ago and wears  "glasses.  - Dental: regular dental visits, brushes teeth once daily and floss regularly.    /GYN Health:  - Follows with GYN: yes.   - Menopause: postmenopausal.   - History of STDs: no  - Contraception: tubal ligation.      Advanced Care Planning:  - Has an advanced directive?: yes    - Has a durable medical POA?: yes    - ACP document given to patient?: no      Review of Systems   Constitutional:  Negative for activity change, chills, fatigue and fever.   HENT:  Negative for congestion.    Eyes:  Negative for discharge.   Respiratory:  Negative for cough, chest tightness and shortness of breath.    Cardiovascular:  Negative for chest pain, palpitations and leg swelling.   Gastrointestinal:  Positive for constipation. Negative for abdominal pain, blood in stool, diarrhea, nausea and vomiting.   Endocrine: Negative for polydipsia, polyphagia and polyuria.   Genitourinary:  Negative for difficulty urinating.   Musculoskeletal:  Negative for arthralgias and myalgias.   Skin:  Negative for rash.   Allergic/Immunologic: Negative for immunocompromised state.   Neurological:  Negative for dizziness, syncope, weakness, light-headedness and headaches.   Hematological:  Negative for adenopathy. Does not bruise/bleed easily.   Psychiatric/Behavioral:  Negative for dysphoric mood, sleep disturbance and suicidal ideas. The patient is not nervous/anxious.          Objective   /70 (BP Location: Left arm, Patient Position: Sitting, Cuff Size: Extra-Large)   Pulse 95   Temp 98 °F (36.7 °C) (Tympanic)   Resp 18   Ht 5' 11\" (1.803 m)   Wt 135 kg (297 lb)   LMP 01/01/2015   SpO2 99%   BMI 41.42 kg/m²     Physical Exam  Vitals and nursing note reviewed.   Constitutional:       General: She is not in acute distress.     Appearance: She is well-developed. She is not ill-appearing.      Comments: Well-developed, obese 59-year-old female appearing about stated age in no acute distress.   HENT:      Head: Normocephalic and " atraumatic.      Right Ear: Tympanic membrane, ear canal and external ear normal.      Left Ear: Tympanic membrane, ear canal and external ear normal.      Nose: Nose normal.      Mouth/Throat:      Mouth: Mucous membranes are moist.      Pharynx: Oropharynx is clear. No oropharyngeal exudate.     Eyes:      Extraocular Movements: Extraocular movements intact.      Conjunctiva/sclera: Conjunctivae normal.      Pupils: Pupils are equal, round, and reactive to light.     Neck:      Thyroid: No thyromegaly.      Vascular: No carotid bruit or JVD.     Cardiovascular:      Rate and Rhythm: Normal rate and regular rhythm.      Heart sounds: Normal heart sounds. No murmur heard.  Pulmonary:      Effort: Pulmonary effort is normal.      Breath sounds: Normal breath sounds.   Chest:      Chest wall: No tenderness.   Abdominal:      General: Bowel sounds are normal.      Palpations: Abdomen is soft. There is no hepatomegaly, splenomegaly or mass.      Tenderness: There is no abdominal tenderness. There is no right CVA tenderness or left CVA tenderness.     Musculoskeletal:         General: No swelling or tenderness. Normal range of motion.      Cervical back: Normal range of motion and neck supple.      Right lower leg: Edema (Chronic lymphedema) present.      Left lower leg: Edema (Chronic lymphedema) present.   Lymphadenopathy:      Cervical: No cervical adenopathy.     Skin:     General: Skin is warm and dry.      Findings: No rash.     Neurological:      General: No focal deficit present.      Mental Status: She is alert and oriented to person, place, and time. Mental status is at baseline.      Cranial Nerves: No cranial nerve deficit.      Sensory: No sensory deficit.      Motor: No weakness.      Coordination: Coordination normal.      Gait: Gait normal.      Deep Tendon Reflexes: Reflexes normal.     Psychiatric:         Mood and Affect: Mood normal.         Behavior: Behavior normal.         Thought Content: Thought  content normal.         Judgment: Judgment normal.